# Patient Record
Sex: FEMALE | Race: WHITE | NOT HISPANIC OR LATINO | Employment: UNEMPLOYED | URBAN - NONMETROPOLITAN AREA
[De-identification: names, ages, dates, MRNs, and addresses within clinical notes are randomized per-mention and may not be internally consistent; named-entity substitution may affect disease eponyms.]

---

## 2023-02-21 PROBLEM — D64.9 ANEMIA: Status: ACTIVE | Noted: 2023-02-21

## 2023-02-21 PROBLEM — I47.19 ATRIAL TACHYCARDIA (CMS-HCC): Status: ACTIVE | Noted: 2023-02-21

## 2023-02-21 PROBLEM — E66.9 DIABETES MELLITUS TYPE 2 IN OBESE: Status: ACTIVE | Noted: 2023-02-21

## 2023-02-21 PROBLEM — M86.179: Status: ACTIVE | Noted: 2023-02-21

## 2023-02-21 PROBLEM — E78.5 DYSLIPIDEMIA: Status: ACTIVE | Noted: 2023-02-21

## 2023-02-21 PROBLEM — K74.60 HEPATIC CIRRHOSIS (MULTI): Status: ACTIVE | Noted: 2023-02-21

## 2023-02-21 PROBLEM — S91.109A OPEN TOE WOUND: Status: ACTIVE | Noted: 2023-02-21

## 2023-02-21 PROBLEM — E11.69 DIABETES MELLITUS TYPE 2 IN OBESE: Status: ACTIVE | Noted: 2023-02-21

## 2023-02-21 PROBLEM — F32.A DEPRESSION: Status: ACTIVE | Noted: 2023-02-21

## 2023-02-21 PROBLEM — F41.9 ANXIETY: Status: ACTIVE | Noted: 2023-02-21

## 2023-02-21 PROBLEM — L03.031 CELLULITIS OF TOE OF RIGHT FOOT: Status: ACTIVE | Noted: 2023-02-21

## 2023-02-21 PROBLEM — E66.9 CLASS 1 OBESITY WITH BODY MASS INDEX (BMI) OF 31.0 TO 31.9 IN ADULT: Status: ACTIVE | Noted: 2023-02-21

## 2023-02-21 PROBLEM — H02.9 EYELID LESION: Status: ACTIVE | Noted: 2023-02-21

## 2023-02-21 PROBLEM — L03.90 CELLULITIS: Status: ACTIVE | Noted: 2023-02-21

## 2023-02-21 PROBLEM — E66.811 CLASS 1 OBESITY WITH BODY MASS INDEX (BMI) OF 31.0 TO 31.9 IN ADULT: Status: ACTIVE | Noted: 2023-02-21

## 2023-02-21 PROBLEM — M79.89 LEG SWELLING: Status: ACTIVE | Noted: 2023-02-21

## 2023-02-21 PROBLEM — L97.519: Status: ACTIVE | Noted: 2023-02-21

## 2023-02-21 PROBLEM — R16.0 LIVER MASS: Status: ACTIVE | Noted: 2023-02-21

## 2023-02-21 PROBLEM — L03.115 CELLULITIS OF RIGHT LOWER EXTREMITY: Status: ACTIVE | Noted: 2023-02-21

## 2023-02-21 PROBLEM — I10 BENIGN ESSENTIAL HYPERTENSION: Status: ACTIVE | Noted: 2023-02-21

## 2023-02-21 PROBLEM — R19.06 EPIGASTRIC LUMP: Status: ACTIVE | Noted: 2023-02-21

## 2023-02-21 RX ORDER — ASCORBIC ACID 250 MG
250 TABLET ORAL 2 TIMES DAILY
COMMUNITY

## 2023-02-21 RX ORDER — ATORVASTATIN CALCIUM 20 MG/1
20 TABLET, FILM COATED ORAL DAILY
COMMUNITY
Start: 2022-06-30 | End: 2023-06-19 | Stop reason: SDUPTHER

## 2023-02-21 RX ORDER — SITAGLIPTIN AND METFORMIN HYDROCHLORIDE 500; 50 MG/1; MG/1
1 TABLET, FILM COATED ORAL
COMMUNITY
Start: 2022-06-30 | End: 2023-06-19 | Stop reason: SDUPTHER

## 2023-02-21 RX ORDER — LOSARTAN POTASSIUM AND HYDROCHLOROTHIAZIDE 12.5; 5 MG/1; MG/1
1 TABLET ORAL DAILY
COMMUNITY
Start: 2021-06-21 | End: 2023-06-19 | Stop reason: SDUPTHER

## 2023-02-21 RX ORDER — FERROUS GLUCONATE 325 MG
38 TABLET ORAL 2 TIMES DAILY
COMMUNITY
End: 2023-03-14 | Stop reason: SDUPTHER

## 2023-02-21 RX ORDER — AMLODIPINE BESYLATE 10 MG/1
10 TABLET ORAL DAILY
COMMUNITY
Start: 2022-06-03 | End: 2023-06-19 | Stop reason: SDUPTHER

## 2023-02-21 RX ORDER — GABAPENTIN 400 MG/1
400 CAPSULE ORAL 3 TIMES DAILY
COMMUNITY
End: 2023-03-14 | Stop reason: SDUPTHER

## 2023-02-21 RX ORDER — CEPHALEXIN 500 MG/1
500 CAPSULE ORAL 3 TIMES DAILY
COMMUNITY
End: 2023-06-19 | Stop reason: ALTCHOICE

## 2023-02-21 RX ORDER — METOPROLOL SUCCINATE 50 MG/1
50 TABLET, EXTENDED RELEASE ORAL DAILY
COMMUNITY
Start: 2020-01-19 | End: 2023-06-19 | Stop reason: SDUPTHER

## 2023-02-21 RX ORDER — POTASSIUM CHLORIDE 1500 MG/1
20 TABLET, EXTENDED RELEASE ORAL 2 TIMES DAILY
COMMUNITY
Start: 2021-06-21 | End: 2023-06-19 | Stop reason: SDUPTHER

## 2023-02-21 RX ORDER — SERTRALINE HYDROCHLORIDE 100 MG/1
100 TABLET, FILM COATED ORAL 2 TIMES DAILY
COMMUNITY
End: 2023-06-19 | Stop reason: SDUPTHER

## 2023-03-14 DIAGNOSIS — D50.9 IRON DEFICIENCY ANEMIA, UNSPECIFIED IRON DEFICIENCY ANEMIA TYPE: ICD-10-CM

## 2023-03-14 DIAGNOSIS — L03.115 CELLULITIS OF RIGHT LOWER EXTREMITY: ICD-10-CM

## 2023-03-14 DIAGNOSIS — M86.179 OTHER ACUTE OSTEOMYELITIS, UNSPECIFIED ANKLE AND FOOT (MULTI): ICD-10-CM

## 2023-03-14 RX ORDER — FERROUS GLUCONATE 325 MG
38 TABLET ORAL 2 TIMES DAILY
Qty: 180 TABLET | Refills: 0 | Status: SHIPPED | OUTPATIENT
Start: 2023-03-14 | End: 2023-09-05

## 2023-03-14 RX ORDER — GABAPENTIN 400 MG/1
400 CAPSULE ORAL 3 TIMES DAILY
Qty: 270 CAPSULE | Refills: 0 | Status: SHIPPED | OUTPATIENT
Start: 2023-03-14 | End: 2023-06-19 | Stop reason: SDUPTHER

## 2023-03-23 ENCOUNTER — APPOINTMENT (OUTPATIENT)
Dept: PRIMARY CARE | Facility: CLINIC | Age: 58
End: 2023-03-23
Payer: COMMERCIAL

## 2023-04-17 DIAGNOSIS — Z12.31 VISIT FOR SCREENING MAMMOGRAM: ICD-10-CM

## 2023-05-19 RX ORDER — SERTRALINE HYDROCHLORIDE 100 MG/1
TABLET, FILM COATED ORAL
Qty: 180 TABLET | Refills: 3 | OUTPATIENT
Start: 2023-05-19

## 2023-06-01 RX ORDER — ATORVASTATIN CALCIUM 20 MG/1
TABLET, FILM COATED ORAL
Qty: 90 TABLET | Refills: 3 | OUTPATIENT
Start: 2023-06-01

## 2023-06-05 DIAGNOSIS — N63.20 MASS OF LEFT BREAST, UNSPECIFIED QUADRANT: Primary | ICD-10-CM

## 2023-06-05 NOTE — TELEPHONE ENCOUNTER
----- Message from Trell Pierre MD sent at 6/5/2023  2:24 PM EDT -----  Suspicious lesion in the left breast needs follow-up biopsy refer patient to breast surgery  Refer patient to Dr. Awad

## 2023-06-14 ENCOUNTER — APPOINTMENT (OUTPATIENT)
Dept: PRIMARY CARE | Facility: CLINIC | Age: 58
End: 2023-06-14
Payer: COMMERCIAL

## 2023-06-15 ENCOUNTER — APPOINTMENT (OUTPATIENT)
Dept: PRIMARY CARE | Facility: CLINIC | Age: 58
End: 2023-06-15
Payer: COMMERCIAL

## 2023-06-19 ENCOUNTER — OFFICE VISIT (OUTPATIENT)
Dept: PRIMARY CARE | Facility: CLINIC | Age: 58
End: 2023-06-19
Payer: COMMERCIAL

## 2023-06-19 VITALS
TEMPERATURE: 97.7 F | WEIGHT: 188.1 LBS | OXYGEN SATURATION: 97 % | SYSTOLIC BLOOD PRESSURE: 136 MMHG | HEART RATE: 87 BPM | BODY MASS INDEX: 32.29 KG/M2 | DIASTOLIC BLOOD PRESSURE: 80 MMHG

## 2023-06-19 DIAGNOSIS — I10 BENIGN ESSENTIAL HYPERTENSION: ICD-10-CM

## 2023-06-19 DIAGNOSIS — E66.9 DIABETES MELLITUS TYPE 2 IN OBESE: ICD-10-CM

## 2023-06-19 DIAGNOSIS — E78.5 DYSLIPIDEMIA: ICD-10-CM

## 2023-06-19 DIAGNOSIS — I47.19 ATRIAL TACHYCARDIA (CMS-HCC): ICD-10-CM

## 2023-06-19 DIAGNOSIS — E87.6 HYPOKALEMIA: ICD-10-CM

## 2023-06-19 DIAGNOSIS — F32.A DEPRESSION, UNSPECIFIED DEPRESSION TYPE: ICD-10-CM

## 2023-06-19 DIAGNOSIS — M86.179 OTHER ACUTE OSTEOMYELITIS, UNSPECIFIED ANKLE AND FOOT (MULTI): ICD-10-CM

## 2023-06-19 DIAGNOSIS — E11.69 DIABETES MELLITUS TYPE 2 IN OBESE: ICD-10-CM

## 2023-06-19 DIAGNOSIS — I10 HYPERTENSION, UNSPECIFIED TYPE: ICD-10-CM

## 2023-06-19 DIAGNOSIS — Z79.899 HIGH RISK MEDICATION USE: Primary | ICD-10-CM

## 2023-06-19 DIAGNOSIS — L03.115 CELLULITIS OF RIGHT LOWER EXTREMITY: ICD-10-CM

## 2023-06-19 LAB
POC FINGERSTICK BLOOD GLUCOSE: 110 MG/DL (ref 70–100)
POC HEMOGLOBIN A1C: 5 % (ref 4.2–6.5)

## 2023-06-19 PROCEDURE — 1036F TOBACCO NON-USER: CPT | Performed by: INTERNAL MEDICINE

## 2023-06-19 PROCEDURE — 3075F SYST BP GE 130 - 139MM HG: CPT | Performed by: INTERNAL MEDICINE

## 2023-06-19 PROCEDURE — 82962 GLUCOSE BLOOD TEST: CPT | Performed by: INTERNAL MEDICINE

## 2023-06-19 PROCEDURE — 3044F HG A1C LEVEL LT 7.0%: CPT | Performed by: INTERNAL MEDICINE

## 2023-06-19 PROCEDURE — 3079F DIAST BP 80-89 MM HG: CPT | Performed by: INTERNAL MEDICINE

## 2023-06-19 PROCEDURE — 83036 HEMOGLOBIN GLYCOSYLATED A1C: CPT | Performed by: INTERNAL MEDICINE

## 2023-06-19 PROCEDURE — 99214 OFFICE O/P EST MOD 30 MIN: CPT | Performed by: INTERNAL MEDICINE

## 2023-06-19 RX ORDER — SERTRALINE HYDROCHLORIDE 100 MG/1
100 TABLET, FILM COATED ORAL 2 TIMES DAILY
Qty: 180 TABLET | Refills: 1 | Status: ON HOLD | OUTPATIENT
Start: 2023-06-19 | End: 2023-11-28

## 2023-06-19 RX ORDER — METOPROLOL SUCCINATE 50 MG/1
50 TABLET, EXTENDED RELEASE ORAL DAILY
Qty: 90 TABLET | Refills: 1 | Status: ON HOLD | OUTPATIENT
Start: 2023-06-19 | End: 2023-11-28

## 2023-06-19 RX ORDER — GABAPENTIN 400 MG/1
400 CAPSULE ORAL 3 TIMES DAILY
Qty: 270 CAPSULE | Refills: 0 | Status: SHIPPED | OUTPATIENT
Start: 2023-06-19 | End: 2024-01-17 | Stop reason: SDUPTHER

## 2023-06-19 RX ORDER — AMLODIPINE BESYLATE 10 MG/1
10 TABLET ORAL DAILY
Qty: 90 TABLET | Refills: 1 | Status: ON HOLD | OUTPATIENT
Start: 2023-06-19 | End: 2023-11-28

## 2023-06-19 RX ORDER — LOSARTAN POTASSIUM AND HYDROCHLOROTHIAZIDE 12.5; 5 MG/1; MG/1
1 TABLET ORAL DAILY
Qty: 90 TABLET | Refills: 1 | Status: ON HOLD | OUTPATIENT
Start: 2023-06-19 | End: 2023-11-28

## 2023-06-19 RX ORDER — POTASSIUM CHLORIDE 1500 MG/1
20 TABLET, EXTENDED RELEASE ORAL 2 TIMES DAILY
Qty: 180 TABLET | Refills: 1 | Status: ON HOLD | OUTPATIENT
Start: 2023-06-19 | End: 2023-11-28

## 2023-06-19 RX ORDER — SITAGLIPTIN AND METFORMIN HYDROCHLORIDE 500; 50 MG/1; MG/1
1 TABLET, FILM COATED ORAL
Qty: 180 TABLET | Refills: 1 | Status: SHIPPED | OUTPATIENT
Start: 2023-06-19 | End: 2024-01-17 | Stop reason: SDUPTHER

## 2023-06-19 RX ORDER — ATORVASTATIN CALCIUM 20 MG/1
20 TABLET, FILM COATED ORAL DAILY
Qty: 90 TABLET | Refills: 1 | Status: SHIPPED | OUTPATIENT
Start: 2023-06-19 | End: 2024-01-17 | Stop reason: SDUPTHER

## 2023-06-19 ASSESSMENT — ENCOUNTER SYMPTOMS
ARTHRALGIAS: 0
BLOOD IN STOOL: 0
WOUND: 1
SORE THROAT: 0
PALPITATIONS: 0
SINUS PAIN: 0
DIARRHEA: 0
COUGH: 0
HYPERTENSION: 1
DIZZINESS: 0
WHEEZING: 0
HEADACHES: 0
FATIGUE: 0
ABDOMINAL PAIN: 0
BRUISES/BLEEDS EASILY: 0
FEVER: 0
DIFFICULTY URINATING: 0
UNEXPECTED WEIGHT CHANGE: 0

## 2023-06-19 NOTE — PROGRESS NOTES
Subjective   Patient ID: Sabrina Michaud is a 58 y.o. female who presents for Diabetes (Requests ozempic), Med Refill, Hypertension, and Hyperlipidemia.    -Status post right leg cellulitis improving right big toe diabetic ulcer patient under care of podiatry healing well slowly  -Diabetes obesity patient takes Janumet twice a day need to lose weight low-carb diet exercise  -Add Ozempic 0.25 mg weekly  -Status post right lower extremity cellulitis continue with Keflex follow-up infectious disease  - Diabetes controlled to continue with current medication  - Peripheral neuropathy continue with gabapentin  -Underlying the right lower eyelid skin lesion need to proceed with plastic surgery may have underlying basal cell cancer repair done today  -Hypertension controlled  -Anxiety and depression, uncontrolled , increase Zoloft 100 mg, to BID no medication side effects  Weight loss   -Anemia or leukopenia and thrombocytopenia hepatomegaly unremarkable liver exam on MRI secondary to alcohol induced liver cirrhosis  Patient counseled about alcohol cessation needs to follow-up hematology upper endoscopy as an outpatient patient needs to schedule appointment with Dr. Us  -Anemia follow-up hematology oncology, follow-up CBC  Reevaluate patient in 4 weeks        Diabetes  Pertinent negatives for hypoglycemia include no dizziness or headaches. Pertinent negatives for diabetes include no chest pain and no fatigue.   Med Refill  Pertinent negatives include no abdominal pain, arthralgias, chest pain, congestion, coughing, fatigue, fever, headaches, rash or sore throat.   Hypertension  Pertinent negatives include no chest pain, headaches or palpitations.   Hyperlipidemia  Pertinent negatives include no chest pain.          Review of Systems   Constitutional:  Negative for fatigue, fever and unexpected weight change.   HENT:  Negative for congestion, ear discharge, ear pain, mouth sores, sinus pain and sore throat.    Eyes:   Negative for visual disturbance.   Respiratory:  Negative for cough and wheezing.    Cardiovascular:  Negative for chest pain, palpitations and leg swelling.   Gastrointestinal:  Negative for abdominal pain, blood in stool and diarrhea.   Genitourinary:  Negative for difficulty urinating.   Musculoskeletal:  Negative for arthralgias.   Skin:  Positive for wound. Negative for rash.   Neurological:  Negative for dizziness and headaches.   Hematological:  Does not bruise/bleed easily.   Psychiatric/Behavioral:  Negative for behavioral problems.    All other systems reviewed and are negative.      Objective   Lab Results   Component Value Date    HGBA1C 5.0 06/19/2023      /80   Pulse 87   Temp 36.5 °C (97.7 °F)   Wt 85.3 kg (188 lb 1.6 oz)   SpO2 97%   BMI 32.29 kg/m²   Lab Results   Component Value Date    WBC 4.9 02/12/2023    HGB 9.9 (L) 02/12/2023    HCT 33.1 (L) 02/12/2023     (L) 02/12/2023    CHOL 112 06/01/2022    TRIG 222 (H) 06/01/2022    HDL 32.0 (A) 06/01/2022    ALT 18 02/07/2023    AST 35 02/07/2023     02/12/2023    K 3.6 02/12/2023     02/12/2023    CREATININE 0.50 02/12/2023    BUN 10 02/12/2023    CO2 26 02/12/2023    TSH 3.89 05/24/2021    INR 1.3 (H) 08/17/2022    HGBA1C 5.0 06/19/2023     par   Physical Exam  Vitals and nursing note reviewed.   Constitutional:       Appearance: Normal appearance.   HENT:      Head: Normocephalic.      Nose: Nose normal.   Eyes:      Conjunctiva/sclera: Conjunctivae normal.      Pupils: Pupils are equal, round, and reactive to light.   Cardiovascular:      Rate and Rhythm: Regular rhythm.   Pulmonary:      Effort: Pulmonary effort is normal.      Breath sounds: Normal breath sounds.   Abdominal:      General: Abdomen is flat.      Palpations: Abdomen is soft.   Musculoskeletal:      Cervical back: Neck supple.   Skin:     General: Skin is warm.      Findings: No lesion (Diabetic foot ulcer on the right big toe with surgical dressing).    Neurological:      General: No focal deficit present.      Mental Status: She is oriented to person, place, and time.   Psychiatric:         Mood and Affect: Mood normal.         Assessment/Plan   Sabrina was seen today for diabetes, med refill, hypertension and hyperlipidemia.  Diagnoses and all orders for this visit:  High risk medication use (Primary)  -     CBC and Auto Differential; Future  Diabetes mellitus type 2 in obese (CMS/HCC)  -     POCT fingerstick glucose manually resulted  -     POCT glycosylated hemoglobin (Hb A1C) manually resulted  -     SITagliptin phos-metformin (Janumet)  mg tablet; Take 1 tablet by mouth 2 times a day with meals.  -     semaglutide 0.25 mg or 0.5 mg (2 mg/3 mL) pen injector; Inject 0.25 mg under the skin 1 (one) time per week.  Other acute osteomyelitis, unspecified ankle and foot (CMS/[facility])  -     gabapentin (Neurontin) 400 mg capsule; Take 1 capsule (400 mg) by mouth 3 times a day.  Benign essential hypertension  -     amLODIPine (Norvasc) 10 mg tablet; Take 1 tablet (10 mg) by mouth once daily.  -     losartan-hydrochlorothiazide (Hyzaar) 50-12.5 mg tablet; Take 1 tablet by mouth once daily.  -     metoprolol succinate XL (Toprol-XL) 50 mg 24 hr tablet; Take 1 tablet (50 mg) by mouth once daily. Entered during abstraction- No further refills until seen in office  Depression, unspecified depression type  -     sertraline (Zoloft) 100 mg tablet; Take 1 tablet (100 mg) by mouth 2 times a day.  Hypokalemia  -     potassium chloride CR (K-Tab) 20 mEq ER tablet; Take 1 tablet (20 mEq) by mouth 2 times a day.  Dyslipidemia  -     atorvastatin (Lipitor) 20 mg tablet; Take 1 tablet (20 mg) by mouth once daily.  Atrial tachycardia (CMS/[facility])  Hypertension, unspecified type  -     Comprehensive Metabolic Panel; Future  Cellulitis of right lower extremity  Other orders  -     Follow Up In Primary Care; Future  -     Follow Up In Primary Care; Future   -Status post right leg  cellulitis improving right big toe diabetic ulcer patient under care of podiatry healing well slowly  -Diabetes obesity patient takes Janumet twice a day need to lose weight low-carb diet exercise  -Add Ozempic 0.25 mg weekly  -Status post right lower extremity cellulitis continue with Keflex follow-up infectious disease  - Diabetes controlled to continue with current medication  - Peripheral neuropathy continue with gabapentin  -Underlying the right lower eyelid skin lesion need to proceed with plastic surgery may have underlying basal cell cancer repair done today  -Hypertension controlled  -Anxiety and depression, uncontrolled , increase Zoloft 100 mg, to BID no medication side effects  Weight loss   -Anemia or leukopenia and thrombocytopenia hepatomegaly unremarkable liver exam on MRI secondary to alcohol induced liver cirrhosis  Patient counseled about alcohol cessation needs to follow-up hematology upper endoscopy as an outpatient patient needs to schedule appointment with Dr. Us  -Anemia follow-up hematology oncology, follow-up CBC  Reevaluate patient in 4 weeks

## 2023-06-20 DIAGNOSIS — E11.69 DIABETES MELLITUS TYPE 2 IN OBESE: ICD-10-CM

## 2023-06-20 DIAGNOSIS — E66.9 DIABETES MELLITUS TYPE 2 IN OBESE: ICD-10-CM

## 2023-07-15 LAB
GRAM STAIN: ABNORMAL
TISSUE/WOUND CULTURE/SMEAR: ABNORMAL
TISSUE/WOUND CULTURE/SMEAR: ABNORMAL

## 2023-09-04 DIAGNOSIS — D50.9 IRON DEFICIENCY ANEMIA, UNSPECIFIED IRON DEFICIENCY ANEMIA TYPE: ICD-10-CM

## 2023-09-05 RX ORDER — FERROUS GLUCONATE 324(38)MG
TABLET ORAL
Qty: 180 TABLET | Refills: 1 | Status: SHIPPED | OUTPATIENT
Start: 2023-09-05 | End: 2024-01-17 | Stop reason: SDUPTHER

## 2023-09-06 ENCOUNTER — HOSPITAL ENCOUNTER (OUTPATIENT)
Dept: DATA CONVERSION | Facility: HOSPITAL | Age: 58
Discharge: HOME | End: 2023-09-06
Payer: COMMERCIAL

## 2023-09-06 DIAGNOSIS — C50.512 MALIGNANT NEOPLASM OF LOWER-OUTER QUADRANT OF LEFT FEMALE BREAST (MULTI): ICD-10-CM

## 2023-09-06 LAB
ANION GAP SERPL CALCULATED.3IONS-SCNC: 15 MMOL/L (ref 0–19)
BACTERIA ISLT: NORMAL
BACTERIA SPEC CULT: NORMAL
BACTERIA UR QL AUTO: NEGATIVE
BASOPHILS # BLD AUTO: 0.04 K/UL (ref 0–0.22)
BASOPHILS NFR BLD AUTO: 0.6 % (ref 0–1)
BILIRUB UR QL STRIP.AUTO: NEGATIVE
BUN SERPL-MCNC: 11 MG/DL (ref 8–25)
BUN/CREAT SERPL: 18.3 RATIO (ref 8–21)
CALCIUM SERPL-MCNC: 10.5 MG/DL (ref 8.5–10.4)
CC # UR: NORMAL /UL
CEFEPIME SUSC ISLT: NORMAL
CEFTAZIDIME SUSC ISLT: NORMAL
CHLORIDE SERPL-SCNC: 101 MMOL/L (ref 97–107)
CIPROFLOXACIN SUSC ISLT: NORMAL
CLARITY UR: CLEAR
CO2 SERPL-SCNC: 23 MMOL/L (ref 24–31)
COLOR UR: YELLOW
CREAT SERPL-MCNC: 0.6 MG/DL (ref 0.4–1.6)
DEPRECATED RDW RBC AUTO: 50.1 FL (ref 37–54)
DIFFERENTIAL METHOD BLD: ABNORMAL
EOSINOPHIL # BLD AUTO: 0.23 K/UL (ref 0–0.45)
EOSINOPHIL NFR BLD: 3.5 % (ref 0–3)
ERYTHROCYTE [DISTWIDTH] IN BLOOD BY AUTOMATED COUNT: 13.3 % (ref 11.7–15)
GENTAMICIN ISLT MLC: NORMAL
GFR SERPL CREATININE-BSD FRML MDRD: 104 ML/MIN/1.73 M2
GLUCOSE SERPL-MCNC: 86 MG/DL (ref 65–99)
GLUCOSE UR STRIP.AUTO-MCNC: NEGATIVE MG/DL
HBA1C MFR BLD: 4.7 % (ref 4–6)
HCT VFR BLD AUTO: 38.4 % (ref 36–44)
HGB BLD-MCNC: 12.4 GM/DL (ref 12–15)
HGB UR QL STRIP.AUTO: 2 /HPF (ref 0–3)
HGB UR QL: NEGATIVE
HYALINE CASTS UR QL AUTO: ABNORMAL /LPF
IMM GRANULOCYTES # BLD AUTO: 0.03 K/UL (ref 0–0.1)
KETONES UR QL STRIP.AUTO: NEGATIVE
LEUKOCYTE ESTERASE UR QL STRIP.AUTO: ABNORMAL
LEVOFLOXACIN SUSC ISLT: NORMAL
LYMPHOCYTES # BLD AUTO: 1.64 K/UL (ref 1.2–3.2)
LYMPHOCYTES NFR BLD MANUAL: 25.1 % (ref 20–40)
MCH RBC QN AUTO: 32.8 PG (ref 26–34)
MCHC RBC AUTO-ENTMCNC: 32.3 % (ref 31–37)
MCV RBC AUTO: 101.6 FL (ref 80–100)
MEROPENEM SUSC ISLT: NORMAL
MIC (SUSCEPTIBILITY): NORMAL
MICROSCOPIC (UA): ABNORMAL
MONOCYTES # BLD AUTO: 0.46 K/UL (ref 0–0.8)
MONOCYTES NFR BLD MANUAL: 7 % (ref 0–8)
MRSA DNA SPEC QL NAA+PROBE: NEGATIVE
NEUTROPHILS # BLD AUTO: 4.14 K/UL
NEUTROPHILS # BLD AUTO: 4.14 K/UL (ref 1.8–7.7)
NEUTROPHILS.IMMATURE NFR BLD: 0.5 % (ref 0–1)
NEUTS SEG NFR BLD: 63.3 % (ref 50–70)
NITRITE UR QL STRIP.AUTO: NEGATIVE
NRBC BLD-RTO: 0 /100 WBC
PH UR STRIP.AUTO: 7.5 [PH] (ref 4.6–8)
PIP+TAZO SUSC ISLT: NORMAL
PLATELET # BLD AUTO: 97 K/UL (ref 150–450)
PMV BLD AUTO: 12.5 CU (ref 7–12.6)
POTASSIUM SERPL-SCNC: 4.3 MMOL/L (ref 3.4–5.1)
PROT UR STRIP.AUTO-MCNC: NEGATIVE MG/DL
RBC # BLD AUTO: 3.78 M/UL (ref 4–4.9)
REPORT STATUS -LH SQ DATA CONVERSION: NORMAL
SERVICE CMNT-IMP: NORMAL
SODIUM SERPL-SCNC: 138 MMOL/L (ref 133–145)
SP GR UR STRIP.AUTO: 1.02 (ref 1–1.03)
SPECIMEN SOURCE: NORMAL
SPECIMEN SOURCE: NORMAL
SQUAMOUS UR QL AUTO: ABNORMAL /HPF
URINE CULTURE: ABNORMAL
UROBILINOGEN UR QL STRIP.AUTO: 2 MG/DL (ref 0–1)
WBC # BLD AUTO: 6.5 K/UL (ref 4.5–11)
WBC #/AREA URNS AUTO: 7 /HPF (ref 0–3)

## 2023-09-13 ENCOUNTER — HOSPITAL ENCOUNTER (OUTPATIENT)
Dept: DATA CONVERSION | Facility: HOSPITAL | Age: 58
Discharge: HOME | End: 2023-09-14
Payer: COMMERCIAL

## 2023-09-13 DIAGNOSIS — C50.912 MALIGNANT NEOPLASM OF UNSPECIFIED SITE OF LEFT FEMALE BREAST (MULTI): ICD-10-CM

## 2023-09-13 DIAGNOSIS — E11.9 TYPE 2 DIABETES MELLITUS WITHOUT COMPLICATIONS (MULTI): ICD-10-CM

## 2023-09-13 DIAGNOSIS — C50.312 MALIGNANT NEOPLASM OF LOWER-INNER QUADRANT OF LEFT FEMALE BREAST (MULTI): ICD-10-CM

## 2023-09-13 DIAGNOSIS — E88.1 LIPODYSTROPHY, NOT ELSEWHERE CLASSIFIED: ICD-10-CM

## 2023-09-13 DIAGNOSIS — I10 ESSENTIAL (PRIMARY) HYPERTENSION: ICD-10-CM

## 2023-09-13 DIAGNOSIS — N60.21 FIBROADENOSIS OF RIGHT BREAST: ICD-10-CM

## 2023-09-13 DIAGNOSIS — Z41.1 ENCOUNTER FOR COSMETIC SURGERY: ICD-10-CM

## 2023-09-13 DIAGNOSIS — N64.89 OTHER SPECIFIED DISORDERS OF BREAST: ICD-10-CM

## 2023-09-13 LAB
GLUCOSE BLD STRIP.AUTO-MCNC: 103 MG/DL (ref 65–99)
GLUCOSE BLD STRIP.AUTO-MCNC: 136 MG/DL (ref 65–99)
GLUCOSE BLD STRIP.AUTO-MCNC: 148 MG/DL (ref 65–99)

## 2023-09-14 LAB
BASOPHILS # BLD AUTO: 0.02 K/UL (ref 0–0.22)
BASOPHILS NFR BLD AUTO: 0.2 % (ref 0–1)
DEPRECATED RDW RBC AUTO: 50 FL (ref 37–54)
DIFFERENTIAL METHOD BLD: ABNORMAL
EOSINOPHIL # BLD AUTO: 0.04 K/UL (ref 0–0.45)
EOSINOPHIL NFR BLD: 0.5 % (ref 0–3)
ERYTHROCYTE [DISTWIDTH] IN BLOOD BY AUTOMATED COUNT: 13.6 % (ref 11.7–15)
GLUCOSE BLD STRIP.AUTO-MCNC: 96 MG/DL (ref 65–99)
HCT VFR BLD AUTO: 31.6 % (ref 36–44)
HGB BLD-MCNC: 10.3 GM/DL (ref 12–15)
IMM GRANULOCYTES # BLD AUTO: 0.02 K/UL (ref 0–0.1)
LYMPHOCYTES # BLD AUTO: 1.22 K/UL (ref 1.2–3.2)
LYMPHOCYTES NFR BLD MANUAL: 15.2 % (ref 20–40)
MCH RBC QN AUTO: 33 PG (ref 26–34)
MCHC RBC AUTO-ENTMCNC: 32.6 % (ref 31–37)
MCV RBC AUTO: 101.3 FL (ref 80–100)
MONOCYTES # BLD AUTO: 0.71 K/UL (ref 0–0.8)
MONOCYTES NFR BLD MANUAL: 8.9 % (ref 0–8)
NEUTROPHILS # BLD AUTO: 6 K/UL
NEUTROPHILS # BLD AUTO: 6 K/UL (ref 1.8–7.7)
NEUTROPHILS.IMMATURE NFR BLD: 0.2 % (ref 0–1)
NEUTS SEG NFR BLD: 75 % (ref 50–70)
NRBC BLD-RTO: 0 /100 WBC
PLATELET # BLD AUTO: 82 K/UL (ref 150–450)
PMV BLD AUTO: 12.9 CU (ref 7–12.6)
RBC # BLD AUTO: 3.12 M/UL (ref 4–4.9)
WBC # BLD AUTO: 8 K/UL (ref 4.5–11)

## 2023-09-19 ENCOUNTER — APPOINTMENT (OUTPATIENT)
Dept: PRIMARY CARE | Facility: CLINIC | Age: 58
End: 2023-09-19
Payer: COMMERCIAL

## 2023-09-21 LAB
GLUCOSE BLD STRIP.AUTO-MCNC: 100 MG/DL (ref 65–99)
GLUCOSE BLD STRIP.AUTO-MCNC: 104 MG/DL (ref 65–99)
GLUCOSE BLD STRIP.AUTO-MCNC: 107 MG/DL (ref 65–99)
GLUCOSE BLD STRIP.AUTO-MCNC: 121 MG/DL (ref 65–99)

## 2023-09-22 LAB
GLUCOSE BLD STRIP.AUTO-MCNC: 107 MG/DL (ref 65–99)
GLUCOSE BLD STRIP.AUTO-MCNC: 143 MG/DL (ref 65–99)
GLUCOSE BLD STRIP.AUTO-MCNC: 143 MG/DL (ref 65–99)

## 2023-09-25 LAB
GLUCOSE BLD STRIP.AUTO-MCNC: 134 MG/DL (ref 65–99)
GLUCOSE BLD STRIP.AUTO-MCNC: 99 MG/DL (ref 65–99)

## 2023-09-26 LAB
GLUCOSE BLD STRIP.AUTO-MCNC: 190 MG/DL (ref 65–99)
GLUCOSE BLD STRIP.AUTO-MCNC: 73 MG/DL (ref 65–99)

## 2023-09-27 LAB
GLUCOSE BLD STRIP.AUTO-MCNC: 219 MG/DL (ref 65–99)
GLUCOSE BLD STRIP.AUTO-MCNC: 93 MG/DL (ref 65–99)

## 2023-09-28 LAB
GLUCOSE BLD STRIP.AUTO-MCNC: 172 MG/DL (ref 65–99)
GLUCOSE BLD STRIP.AUTO-MCNC: 86 MG/DL (ref 65–99)

## 2023-09-29 ENCOUNTER — HOSPITAL ENCOUNTER (OUTPATIENT)
Dept: DATA CONVERSION | Facility: HOSPITAL | Age: 58
Discharge: HOME | End: 2023-09-29
Payer: COMMERCIAL

## 2023-09-29 DIAGNOSIS — T14.8XXA OTHER INJURY OF UNSPECIFIED BODY REGION, INITIAL ENCOUNTER: ICD-10-CM

## 2023-09-29 LAB
GLUCOSE BLD STRIP.AUTO-MCNC: 121 MG/DL (ref 65–99)
GLUCOSE BLD STRIP.AUTO-MCNC: 168 MG/DL (ref 65–99)

## 2023-09-30 DIAGNOSIS — E66.9 DIABETES MELLITUS TYPE 2 IN OBESE: ICD-10-CM

## 2023-09-30 DIAGNOSIS — E11.69 DIABETES MELLITUS TYPE 2 IN OBESE: ICD-10-CM

## 2023-10-02 ENCOUNTER — OFFICE VISIT (OUTPATIENT)
Dept: WOUND CARE | Facility: HOSPITAL | Age: 58
End: 2023-10-02
Payer: COMMERCIAL

## 2023-10-02 LAB
GLUCOSE BLD STRIP.AUTO-MCNC: 128 MG/DL (ref 65–99)
GLUCOSE BLD STRIP.AUTO-MCNC: 142 MG/DL (ref 65–99)

## 2023-10-02 PROCEDURE — 82962 GLUCOSE BLOOD TEST: CPT | Mod: 91

## 2023-10-02 PROCEDURE — 99183 HYPERBARIC OXYGEN THERAPY: CPT | Performed by: NURSE PRACTITIONER

## 2023-10-02 PROCEDURE — G0277 HBOT, FULL BODY CHAMBER, 30M: HCPCS

## 2023-10-02 RX ORDER — SEMAGLUTIDE 0.68 MG/ML
INJECTION, SOLUTION SUBCUTANEOUS
Qty: 3 ML | Refills: 0 | Status: SHIPPED | OUTPATIENT
Start: 2023-10-02 | End: 2024-01-17 | Stop reason: SDUPTHER

## 2023-10-03 ENCOUNTER — OFFICE VISIT (OUTPATIENT)
Dept: WOUND CARE | Facility: HOSPITAL | Age: 58
End: 2023-10-03
Payer: COMMERCIAL

## 2023-10-03 LAB
GLUCOSE BLD STRIP.AUTO-MCNC: 162 MG/DL (ref 65–99)
GLUCOSE BLD STRIP.AUTO-MCNC: 86 MG/DL (ref 65–99)

## 2023-10-03 PROCEDURE — G0277 HBOT, FULL BODY CHAMBER, 30M: HCPCS

## 2023-10-03 PROCEDURE — 82962 GLUCOSE BLOOD TEST: CPT | Mod: 91

## 2023-10-04 ENCOUNTER — OFFICE VISIT (OUTPATIENT)
Dept: WOUND CARE | Facility: HOSPITAL | Age: 58
End: 2023-10-04
Payer: COMMERCIAL

## 2023-10-04 PROCEDURE — 82947 ASSAY GLUCOSE BLOOD QUANT: CPT

## 2023-10-04 PROCEDURE — 11042 DBRDMT SUBQ TIS 1ST 20SQCM/<: CPT

## 2023-10-04 PROCEDURE — G0277 HBOT, FULL BODY CHAMBER, 30M: HCPCS

## 2023-10-04 PROCEDURE — 82962 GLUCOSE BLOOD TEST: CPT | Mod: 59

## 2023-10-05 ENCOUNTER — OFFICE VISIT (OUTPATIENT)
Dept: WOUND CARE | Facility: HOSPITAL | Age: 58
End: 2023-10-05
Payer: COMMERCIAL

## 2023-10-05 LAB
GLUCOSE BLD MANUAL STRIP-MCNC: 100 MG/DL (ref 74–99)
GLUCOSE BLD MANUAL STRIP-MCNC: 97 MG/DL (ref 74–99)

## 2023-10-05 PROCEDURE — 82962 GLUCOSE BLOOD TEST: CPT | Mod: 91

## 2023-10-05 PROCEDURE — G0277 HBOT, FULL BODY CHAMBER, 30M: HCPCS

## 2023-10-05 PROCEDURE — 99183 HYPERBARIC OXYGEN THERAPY: CPT | Performed by: NURSE PRACTITIONER

## 2023-10-05 PROCEDURE — 82947 ASSAY GLUCOSE BLOOD QUANT: CPT

## 2023-10-06 ENCOUNTER — OFFICE VISIT (OUTPATIENT)
Dept: WOUND CARE | Facility: HOSPITAL | Age: 58
End: 2023-10-06
Payer: COMMERCIAL

## 2023-10-06 LAB
GLUCOSE BLD MANUAL STRIP-MCNC: 101 MG/DL (ref 74–99)
GLUCOSE BLD MANUAL STRIP-MCNC: 167 MG/DL (ref 74–99)

## 2023-10-06 PROCEDURE — 82962 GLUCOSE BLOOD TEST: CPT | Mod: 59

## 2023-10-06 PROCEDURE — G0277 HBOT, FULL BODY CHAMBER, 30M: HCPCS

## 2023-10-06 PROCEDURE — 82947 ASSAY GLUCOSE BLOOD QUANT: CPT

## 2023-10-09 ENCOUNTER — OFFICE VISIT (OUTPATIENT)
Dept: WOUND CARE | Facility: HOSPITAL | Age: 58
End: 2023-10-09
Payer: COMMERCIAL

## 2023-10-09 LAB — GLUCOSE BLD MANUAL STRIP-MCNC: 206 MG/DL (ref 74–99)

## 2023-10-09 PROCEDURE — 82962 GLUCOSE BLOOD TEST: CPT | Mod: 91

## 2023-10-09 PROCEDURE — 3008F BODY MASS INDEX DOCD: CPT

## 2023-10-09 PROCEDURE — G0277 HBOT, FULL BODY CHAMBER, 30M: HCPCS

## 2023-10-09 PROCEDURE — 99183 HYPERBARIC OXYGEN THERAPY: CPT | Performed by: NURSE PRACTITIONER

## 2023-10-09 PROCEDURE — 3044F HG A1C LEVEL LT 7.0%: CPT

## 2023-10-09 PROCEDURE — 1036F TOBACCO NON-USER: CPT

## 2023-10-09 PROCEDURE — 82947 ASSAY GLUCOSE BLOOD QUANT: CPT

## 2023-10-10 ENCOUNTER — OFFICE VISIT (OUTPATIENT)
Dept: WOUND CARE | Facility: HOSPITAL | Age: 58
End: 2023-10-10
Payer: COMMERCIAL

## 2023-10-10 LAB
GLUCOSE BLD MANUAL STRIP-MCNC: 206 MG/DL (ref 74–99)
GLUCOSE BLD MANUAL STRIP-MCNC: 92 MG/DL (ref 74–99)

## 2023-10-10 PROCEDURE — G0277 HBOT, FULL BODY CHAMBER, 30M: HCPCS

## 2023-10-10 PROCEDURE — 82962 GLUCOSE BLOOD TEST: CPT | Mod: 59

## 2023-10-10 PROCEDURE — 82947 ASSAY GLUCOSE BLOOD QUANT: CPT

## 2023-10-11 ENCOUNTER — CLINICAL SUPPORT (OUTPATIENT)
Dept: WOUND CARE | Facility: HOSPITAL | Age: 58
End: 2023-10-11
Payer: COMMERCIAL

## 2023-10-11 ENCOUNTER — OFFICE VISIT (OUTPATIENT)
Dept: WOUND CARE | Facility: HOSPITAL | Age: 58
End: 2023-10-11
Payer: COMMERCIAL

## 2023-10-11 LAB
GLUCOSE BLD MANUAL STRIP-MCNC: 106 MG/DL (ref 74–99)
GLUCOSE BLD MANUAL STRIP-MCNC: 219 MG/DL (ref 74–99)

## 2023-10-11 PROCEDURE — 82962 GLUCOSE BLOOD TEST: CPT | Mod: 91

## 2023-10-11 PROCEDURE — 82947 ASSAY GLUCOSE BLOOD QUANT: CPT

## 2023-10-11 PROCEDURE — G0277 HBOT, FULL BODY CHAMBER, 30M: HCPCS

## 2023-10-11 PROCEDURE — 11042 DBRDMT SUBQ TIS 1ST 20SQCM/<: CPT

## 2023-10-12 ENCOUNTER — APPOINTMENT (OUTPATIENT)
Dept: WOUND CARE | Facility: HOSPITAL | Age: 58
End: 2023-10-12
Payer: COMMERCIAL

## 2023-10-13 ENCOUNTER — APPOINTMENT (OUTPATIENT)
Dept: WOUND CARE | Facility: HOSPITAL | Age: 58
End: 2023-10-13
Payer: COMMERCIAL

## 2023-10-16 ENCOUNTER — OFFICE VISIT (OUTPATIENT)
Dept: WOUND CARE | Facility: HOSPITAL | Age: 58
End: 2023-10-16
Payer: COMMERCIAL

## 2023-10-16 LAB
GLUCOSE BLD MANUAL STRIP-MCNC: 142 MG/DL (ref 74–99)
GLUCOSE BLD MANUAL STRIP-MCNC: 94 MG/DL (ref 74–99)

## 2023-10-16 PROCEDURE — 3008F BODY MASS INDEX DOCD: CPT | Performed by: NURSE PRACTITIONER

## 2023-10-16 PROCEDURE — G0277 HBOT, FULL BODY CHAMBER, 30M: HCPCS | Performed by: NURSE PRACTITIONER

## 2023-10-16 PROCEDURE — 99183 HYPERBARIC OXYGEN THERAPY: CPT | Performed by: NURSE PRACTITIONER

## 2023-10-16 PROCEDURE — 3044F HG A1C LEVEL LT 7.0%: CPT | Performed by: NURSE PRACTITIONER

## 2023-10-16 PROCEDURE — 82962 GLUCOSE BLOOD TEST: CPT

## 2023-10-16 PROCEDURE — 82947 ASSAY GLUCOSE BLOOD QUANT: CPT | Mod: 59

## 2023-10-16 PROCEDURE — 1036F TOBACCO NON-USER: CPT | Performed by: NURSE PRACTITIONER

## 2023-10-16 PROCEDURE — G0277 HBOT, FULL BODY CHAMBER, 30M: HCPCS

## 2023-10-17 ENCOUNTER — OFFICE VISIT (OUTPATIENT)
Dept: WOUND CARE | Facility: HOSPITAL | Age: 58
End: 2023-10-17
Payer: COMMERCIAL

## 2023-10-17 LAB
GLUCOSE BLD MANUAL STRIP-MCNC: 159 MG/DL (ref 74–99)
GLUCOSE BLD MANUAL STRIP-MCNC: 96 MG/DL (ref 74–99)

## 2023-10-17 PROCEDURE — 82962 GLUCOSE BLOOD TEST: CPT | Mod: 91

## 2023-10-17 PROCEDURE — 82947 ASSAY GLUCOSE BLOOD QUANT: CPT

## 2023-10-17 PROCEDURE — G0277 HBOT, FULL BODY CHAMBER, 30M: HCPCS

## 2023-10-18 ENCOUNTER — APPOINTMENT (OUTPATIENT)
Dept: PRIMARY CARE | Facility: CLINIC | Age: 58
End: 2023-10-18
Payer: COMMERCIAL

## 2023-10-18 ENCOUNTER — CLINICAL SUPPORT (OUTPATIENT)
Dept: WOUND CARE | Facility: HOSPITAL | Age: 58
End: 2023-10-18
Payer: COMMERCIAL

## 2023-10-18 ENCOUNTER — OFFICE VISIT (OUTPATIENT)
Dept: WOUND CARE | Facility: HOSPITAL | Age: 58
End: 2023-10-18
Payer: COMMERCIAL

## 2023-10-18 ENCOUNTER — APPOINTMENT (OUTPATIENT)
Dept: WOUND CARE | Facility: HOSPITAL | Age: 58
End: 2023-10-18
Payer: COMMERCIAL

## 2023-10-18 LAB
GLUCOSE BLD MANUAL STRIP-MCNC: 212 MG/DL (ref 74–99)
GLUCOSE BLD MANUAL STRIP-MCNC: 90 MG/DL (ref 74–99)

## 2023-10-18 PROCEDURE — 82947 ASSAY GLUCOSE BLOOD QUANT: CPT

## 2023-10-18 PROCEDURE — 82962 GLUCOSE BLOOD TEST: CPT | Mod: 59

## 2023-10-18 PROCEDURE — G0277 HBOT, FULL BODY CHAMBER, 30M: HCPCS

## 2023-10-18 PROCEDURE — 97597 DBRDMT OPN WND 1ST 20 CM/<: CPT

## 2023-10-19 ENCOUNTER — OFFICE VISIT (OUTPATIENT)
Dept: WOUND CARE | Facility: HOSPITAL | Age: 58
End: 2023-10-19
Payer: COMMERCIAL

## 2023-10-19 LAB
GLUCOSE BLD MANUAL STRIP-MCNC: 109 MG/DL (ref 74–99)
GLUCOSE BLD MANUAL STRIP-MCNC: 202 MG/DL (ref 74–99)

## 2023-10-19 PROCEDURE — 82947 ASSAY GLUCOSE BLOOD QUANT: CPT

## 2023-10-19 PROCEDURE — G0277 HBOT, FULL BODY CHAMBER, 30M: HCPCS

## 2023-10-19 PROCEDURE — 82962 GLUCOSE BLOOD TEST: CPT | Mod: 59

## 2023-10-20 ENCOUNTER — OFFICE VISIT (OUTPATIENT)
Dept: WOUND CARE | Facility: HOSPITAL | Age: 58
End: 2023-10-20
Payer: COMMERCIAL

## 2023-10-20 LAB
GLUCOSE BLD MANUAL STRIP-MCNC: 108 MG/DL (ref 74–99)
GLUCOSE BLD MANUAL STRIP-MCNC: 163 MG/DL (ref 74–99)

## 2023-10-20 PROCEDURE — 82947 ASSAY GLUCOSE BLOOD QUANT: CPT

## 2023-10-20 PROCEDURE — 82962 GLUCOSE BLOOD TEST: CPT | Mod: 91

## 2023-10-20 PROCEDURE — G0277 HBOT, FULL BODY CHAMBER, 30M: HCPCS

## 2023-10-23 ENCOUNTER — APPOINTMENT (OUTPATIENT)
Dept: WOUND CARE | Facility: HOSPITAL | Age: 58
End: 2023-10-23
Payer: COMMERCIAL

## 2023-10-24 ENCOUNTER — APPOINTMENT (OUTPATIENT)
Dept: WOUND CARE | Facility: HOSPITAL | Age: 58
End: 2023-10-24
Payer: COMMERCIAL

## 2023-10-25 ENCOUNTER — CLINICAL SUPPORT (OUTPATIENT)
Dept: WOUND CARE | Facility: HOSPITAL | Age: 58
End: 2023-10-25
Payer: COMMERCIAL

## 2023-10-25 ENCOUNTER — APPOINTMENT (OUTPATIENT)
Dept: WOUND CARE | Facility: HOSPITAL | Age: 58
End: 2023-10-25
Payer: COMMERCIAL

## 2023-10-25 PROCEDURE — 11042 DBRDMT SUBQ TIS 1ST 20SQCM/<: CPT

## 2023-10-26 ENCOUNTER — APPOINTMENT (OUTPATIENT)
Dept: WOUND CARE | Facility: HOSPITAL | Age: 58
End: 2023-10-26
Payer: COMMERCIAL

## 2023-10-27 ENCOUNTER — APPOINTMENT (OUTPATIENT)
Dept: WOUND CARE | Facility: HOSPITAL | Age: 58
End: 2023-10-27
Payer: COMMERCIAL

## 2023-10-30 ENCOUNTER — OFFICE VISIT (OUTPATIENT)
Dept: WOUND CARE | Facility: HOSPITAL | Age: 58
End: 2023-10-30
Payer: COMMERCIAL

## 2023-10-30 LAB
GLUCOSE BLD MANUAL STRIP-MCNC: 160 MG/DL (ref 74–99)
GLUCOSE BLD MANUAL STRIP-MCNC: 88 MG/DL (ref 74–99)

## 2023-10-30 PROCEDURE — 82962 GLUCOSE BLOOD TEST: CPT

## 2023-10-30 PROCEDURE — 3008F BODY MASS INDEX DOCD: CPT | Performed by: NURSE PRACTITIONER

## 2023-10-30 PROCEDURE — 99183 HYPERBARIC OXYGEN THERAPY: CPT | Performed by: NURSE PRACTITIONER

## 2023-10-30 PROCEDURE — 3074F SYST BP LT 130 MM HG: CPT | Performed by: NURSE PRACTITIONER

## 2023-10-30 PROCEDURE — 3078F DIAST BP <80 MM HG: CPT | Performed by: NURSE PRACTITIONER

## 2023-10-30 PROCEDURE — 82947 ASSAY GLUCOSE BLOOD QUANT: CPT | Mod: 59

## 2023-10-30 PROCEDURE — G0277 HBOT, FULL BODY CHAMBER, 30M: HCPCS

## 2023-10-30 PROCEDURE — 3044F HG A1C LEVEL LT 7.0%: CPT | Performed by: NURSE PRACTITIONER

## 2023-10-30 PROCEDURE — 1036F TOBACCO NON-USER: CPT | Performed by: NURSE PRACTITIONER

## 2023-10-31 ENCOUNTER — HOSPITAL ENCOUNTER (INPATIENT)
Facility: HOSPITAL | Age: 58
LOS: 1 days | Discharge: HOME | DRG: 572 | End: 2023-11-03
Attending: EMERGENCY MEDICINE | Admitting: SPECIALIST
Payer: COMMERCIAL

## 2023-10-31 ENCOUNTER — APPOINTMENT (OUTPATIENT)
Dept: CARDIOLOGY | Facility: HOSPITAL | Age: 58
DRG: 572 | End: 2023-10-31
Payer: COMMERCIAL

## 2023-10-31 ENCOUNTER — APPOINTMENT (OUTPATIENT)
Dept: RADIOLOGY | Facility: HOSPITAL | Age: 58
DRG: 572 | End: 2023-10-31
Payer: COMMERCIAL

## 2023-10-31 ENCOUNTER — OFFICE VISIT (OUTPATIENT)
Dept: WOUND CARE | Facility: HOSPITAL | Age: 58
DRG: 572 | End: 2023-10-31
Payer: COMMERCIAL

## 2023-10-31 ENCOUNTER — DOCUMENTATION (OUTPATIENT)
Dept: OCCUPATIONAL MEDICINE | Facility: HOSPITAL | Age: 58
End: 2023-10-31
Payer: COMMERCIAL

## 2023-10-31 DIAGNOSIS — N64.89 SEROMA OF BREAST: ICD-10-CM

## 2023-10-31 DIAGNOSIS — N61.1 ABSCESS OF BREAST: ICD-10-CM

## 2023-10-31 DIAGNOSIS — N61.0 CELLULITIS OF LEFT BREAST: Primary | ICD-10-CM

## 2023-10-31 DIAGNOSIS — M86.179: ICD-10-CM

## 2023-10-31 LAB
ALBUMIN SERPL-MCNC: 4.1 G/DL (ref 3.5–5)
ALP BLD-CCNC: 181 U/L (ref 35–125)
ALT SERPL-CCNC: 21 U/L (ref 5–40)
ANION GAP SERPL CALC-SCNC: 13 MMOL/L
APPEARANCE UR: CLEAR
AST SERPL-CCNC: 33 U/L (ref 5–40)
ATRIAL RATE: 88 BPM
BASOPHILS # BLD AUTO: 0.02 X10*3/UL (ref 0–0.1)
BASOPHILS NFR BLD AUTO: 0.4 %
BILIRUB SERPL-MCNC: 1.4 MG/DL (ref 0.1–1.2)
BILIRUB UR STRIP.AUTO-MCNC: NEGATIVE MG/DL
BUN SERPL-MCNC: 12 MG/DL (ref 8–25)
CALCIUM SERPL-MCNC: 9.4 MG/DL (ref 8.5–10.4)
CHLORIDE SERPL-SCNC: 102 MMOL/L (ref 97–107)
CK SERPL-CCNC: 39 U/L (ref 24–195)
CO2 SERPL-SCNC: 23 MMOL/L (ref 24–31)
COLOR UR: ABNORMAL
CREAT SERPL-MCNC: 0.5 MG/DL (ref 0.4–1.6)
EOSINOPHIL # BLD AUTO: 0.12 X10*3/UL (ref 0–0.7)
EOSINOPHIL NFR BLD AUTO: 2.3 %
ERYTHROCYTE [DISTWIDTH] IN BLOOD BY AUTOMATED COUNT: 13.7 % (ref 11.5–14.5)
GFR SERPL CREATININE-BSD FRML MDRD: >90 ML/MIN/1.73M*2
GLUCOSE BLD MANUAL STRIP-MCNC: 100 MG/DL (ref 74–99)
GLUCOSE BLD MANUAL STRIP-MCNC: 156 MG/DL (ref 74–99)
GLUCOSE SERPL-MCNC: 113 MG/DL (ref 65–99)
GLUCOSE UR STRIP.AUTO-MCNC: NORMAL MG/DL
HCT VFR BLD AUTO: 34.8 % (ref 36–46)
HGB BLD-MCNC: 11.1 G/DL (ref 12–16)
IMM GRANULOCYTES # BLD AUTO: 0.02 X10*3/UL (ref 0–0.7)
IMM GRANULOCYTES NFR BLD AUTO: 0.4 % (ref 0–0.9)
KETONES UR STRIP.AUTO-MCNC: NEGATIVE MG/DL
LACTATE BLDV-SCNC: 1.1 MMOL/L (ref 0.4–2)
LEUKOCYTE ESTERASE UR QL STRIP.AUTO: ABNORMAL
LYMPHOCYTES # BLD AUTO: 1.06 X10*3/UL (ref 1.2–4.8)
LYMPHOCYTES NFR BLD AUTO: 20.6 %
MCH RBC QN AUTO: 32.4 PG (ref 26–34)
MCHC RBC AUTO-ENTMCNC: 31.9 G/DL (ref 32–36)
MCV RBC AUTO: 102 FL (ref 80–100)
MONOCYTES # BLD AUTO: 0.53 X10*3/UL (ref 0.1–1)
MONOCYTES NFR BLD AUTO: 10.3 %
NEUTROPHILS # BLD AUTO: 3.39 X10*3/UL (ref 1.2–7.7)
NEUTROPHILS NFR BLD AUTO: 66 %
NITRITE UR QL STRIP.AUTO: NEGATIVE
NRBC BLD-RTO: 0 /100 WBCS (ref 0–0)
P AXIS: 60 DEGREES
P OFFSET: 197 MS
P ONSET: 132 MS
PH UR STRIP.AUTO: 6.5 [PH]
PLATELET # BLD AUTO: 87 X10*3/UL (ref 150–450)
PMV BLD AUTO: 11.4 FL (ref 7.5–11.5)
POLYCHROMASIA BLD QL SMEAR: NORMAL
POTASSIUM SERPL-SCNC: 3.8 MMOL/L (ref 3.4–5.1)
PR INTERVAL: 168 MS
PROT SERPL-MCNC: 7.9 G/DL (ref 5.9–7.9)
PROT UR STRIP.AUTO-MCNC: NEGATIVE MG/DL
Q ONSET: 216 MS
QRS COUNT: 14 BEATS
QRS DURATION: 88 MS
QT INTERVAL: 366 MS
QTC CALCULATION(BAZETT): 442 MS
QTC FREDERICIA: 416 MS
R AXIS: 62 DEGREES
RBC # BLD AUTO: 3.43 X10*6/UL (ref 4–5.2)
RBC # UR STRIP.AUTO: NEGATIVE /UL
RBC #/AREA URNS AUTO: ABNORMAL /HPF
RBC MORPH BLD: NORMAL
SODIUM SERPL-SCNC: 138 MMOL/L (ref 133–145)
SP GR UR STRIP.AUTO: 1.01
SPHEROCYTES BLD QL SMEAR: NORMAL
SQUAMOUS #/AREA URNS AUTO: ABNORMAL /HPF
STOMATOCYTES BLD QL SMEAR: NORMAL
T AXIS: 33 DEGREES
T OFFSET: 399 MS
UROBILINOGEN UR STRIP.AUTO-MCNC: NORMAL MG/DL
VENTRICULAR RATE: 88 BPM
WBC # BLD AUTO: 5.1 X10*3/UL (ref 4.4–11.3)
WBC #/AREA URNS AUTO: ABNORMAL /HPF

## 2023-10-31 PROCEDURE — 76642 ULTRASOUND BREAST LIMITED: CPT | Mod: LT

## 2023-10-31 PROCEDURE — 87040 BLOOD CULTURE FOR BACTERIA: CPT | Mod: TRILAB

## 2023-10-31 PROCEDURE — 2500000004 HC RX 250 GENERAL PHARMACY W/ HCPCS (ALT 636 FOR OP/ED): Performed by: SPECIALIST

## 2023-10-31 PROCEDURE — 82962 GLUCOSE BLOOD TEST: CPT | Mod: 91

## 2023-10-31 PROCEDURE — 2500000004 HC RX 250 GENERAL PHARMACY W/ HCPCS (ALT 636 FOR OP/ED)

## 2023-10-31 PROCEDURE — 87086 URINE CULTURE/COLONY COUNT: CPT | Mod: TRILAB

## 2023-10-31 PROCEDURE — G0378 HOSPITAL OBSERVATION PER HR: HCPCS

## 2023-10-31 PROCEDURE — 93005 ELECTROCARDIOGRAM TRACING: CPT

## 2023-10-31 PROCEDURE — 87181 SC STD AGAR DILUTION PER AGT: CPT | Mod: TRILAB

## 2023-10-31 PROCEDURE — 2500000001 HC RX 250 WO HCPCS SELF ADMINISTERED DRUGS (ALT 637 FOR MEDICARE OP): Performed by: SPECIALIST

## 2023-10-31 PROCEDURE — 82947 ASSAY GLUCOSE BLOOD QUANT: CPT

## 2023-10-31 PROCEDURE — 80053 COMPREHEN METABOLIC PANEL: CPT

## 2023-10-31 PROCEDURE — 36415 COLL VENOUS BLD VENIPUNCTURE: CPT

## 2023-10-31 PROCEDURE — 81001 URINALYSIS AUTO W/SCOPE: CPT

## 2023-10-31 PROCEDURE — 99285 EMERGENCY DEPT VISIT HI MDM: CPT | Mod: 25 | Performed by: EMERGENCY MEDICINE

## 2023-10-31 PROCEDURE — 85025 COMPLETE CBC W/AUTO DIFF WBC: CPT

## 2023-10-31 PROCEDURE — 87075 CULTR BACTERIA EXCEPT BLOOD: CPT | Mod: TRILAB

## 2023-10-31 PROCEDURE — 96365 THER/PROPH/DIAG IV INF INIT: CPT

## 2023-10-31 PROCEDURE — G0277 HBOT, FULL BODY CHAMBER, 30M: HCPCS

## 2023-10-31 PROCEDURE — 82550 ASSAY OF CK (CPK): CPT | Performed by: INTERNAL MEDICINE

## 2023-10-31 PROCEDURE — 83605 ASSAY OF LACTIC ACID: CPT

## 2023-10-31 RX ORDER — VANCOMYCIN 1 G/200ML
1 INJECTION, SOLUTION INTRAVENOUS ONCE
Status: DISCONTINUED | OUTPATIENT
Start: 2023-10-31 | End: 2023-10-31

## 2023-10-31 RX ORDER — ACETAMINOPHEN 325 MG/1
650 TABLET ORAL ONCE
Status: COMPLETED | OUTPATIENT
Start: 2023-10-31 | End: 2023-10-31

## 2023-10-31 RX ORDER — DEXTROSE, SODIUM CHLORIDE, SODIUM LACTATE, POTASSIUM CHLORIDE, AND CALCIUM CHLORIDE 5; .6; .31; .03; .02 G/100ML; G/100ML; G/100ML; G/100ML; G/100ML
100 INJECTION, SOLUTION INTRAVENOUS CONTINUOUS
Status: DISCONTINUED | OUTPATIENT
Start: 2023-10-31 | End: 2023-11-03 | Stop reason: HOSPADM

## 2023-10-31 RX ORDER — DAPTOMYCIN IN SODIUM CHLORIDE 500 MG/50ML
500 INJECTION, SOLUTION INTRAVENOUS
Status: DISCONTINUED | OUTPATIENT
Start: 2023-11-01 | End: 2023-11-03 | Stop reason: HOSPADM

## 2023-10-31 RX ORDER — ONDANSETRON 4 MG/1
4 TABLET, ORALLY DISINTEGRATING ORAL ONCE
Status: COMPLETED | OUTPATIENT
Start: 2023-10-31 | End: 2023-11-01

## 2023-10-31 RX ORDER — GABAPENTIN 300 MG/1
600 CAPSULE ORAL ONCE
Status: COMPLETED | OUTPATIENT
Start: 2023-10-31 | End: 2023-10-31

## 2023-10-31 RX ORDER — CELECOXIB 200 MG/1
400 CAPSULE ORAL ONCE
Status: DISCONTINUED | OUTPATIENT
Start: 2023-10-31 | End: 2023-11-03 | Stop reason: HOSPADM

## 2023-10-31 RX ORDER — OXYCODONE AND ACETAMINOPHEN 5; 325 MG/1; MG/1
1 TABLET ORAL EVERY 6 HOURS PRN
Status: DISCONTINUED | OUTPATIENT
Start: 2023-10-31 | End: 2023-11-02

## 2023-10-31 RX ORDER — CEFUROXIME SODIUM 1.5 G/16ML
750 INJECTION, POWDER, FOR SOLUTION INTRAVENOUS EVERY 8 HOURS
Status: DISCONTINUED | OUTPATIENT
Start: 2023-10-31 | End: 2023-10-31

## 2023-10-31 RX ORDER — DEXTROSE, SODIUM CHLORIDE, SODIUM LACTATE, POTASSIUM CHLORIDE, AND CALCIUM CHLORIDE 5; .6; .31; .03; .02 G/100ML; G/100ML; G/100ML; G/100ML; G/100ML
50 INJECTION, SOLUTION INTRAVENOUS CONTINUOUS
Status: DISCONTINUED | OUTPATIENT
Start: 2023-10-31 | End: 2023-11-01

## 2023-10-31 RX ORDER — DAPTOMYCIN IN SODIUM CHLORIDE 500 MG/50ML
500 INJECTION, SOLUTION INTRAVENOUS ONCE
Status: COMPLETED | OUTPATIENT
Start: 2023-10-31 | End: 2023-10-31

## 2023-10-31 RX ADMIN — CEFEPIME 2 G: 2 INJECTION, POWDER, FOR SOLUTION INTRAVENOUS at 15:37

## 2023-10-31 RX ADMIN — DAPTOMYCIN IN SODIUM CHLORIDE 500 MG: 500 INJECTION, SOLUTION INTRAVENOUS at 14:24

## 2023-10-31 RX ADMIN — CEFEPIME 2 G: 2 INJECTION, POWDER, FOR SOLUTION INTRAVENOUS at 22:12

## 2023-10-31 RX ADMIN — OXYCODONE AND ACETAMINOPHEN 1 TABLET: 5; 325 TABLET ORAL at 18:52

## 2023-10-31 RX ADMIN — SODIUM CHLORIDE, SODIUM LACTATE, POTASSIUM CHLORIDE, CALCIUM CHLORIDE, AND DEXTROSE MONOHYDRATE 100 ML/HR: 600; 310; 30; 20; 5 INJECTION, SOLUTION INTRAVENOUS at 23:50

## 2023-10-31 RX ADMIN — GABAPENTIN 600 MG: 300 CAPSULE ORAL at 15:58

## 2023-10-31 RX ADMIN — SODIUM CHLORIDE, SODIUM LACTATE, POTASSIUM CHLORIDE, CALCIUM CHLORIDE, AND DEXTROSE MONOHYDRATE 100 ML/HR: 600; 310; 30; 20; 5 INJECTION, SOLUTION INTRAVENOUS at 16:17

## 2023-10-31 RX ADMIN — ACETAMINOPHEN 650 MG: 325 TABLET ORAL at 15:59

## 2023-10-31 SDOH — SOCIAL STABILITY: SOCIAL INSECURITY: HAS ANYONE EVER THREATENED TO HURT YOUR FAMILY OR YOUR PETS?: NO

## 2023-10-31 SDOH — SOCIAL STABILITY: SOCIAL INSECURITY: ARE THERE ANY APPARENT SIGNS OF INJURIES/BEHAVIORS THAT COULD BE RELATED TO ABUSE/NEGLECT?: NO

## 2023-10-31 SDOH — SOCIAL STABILITY: SOCIAL INSECURITY: DO YOU FEEL ANYONE HAS EXPLOITED OR TAKEN ADVANTAGE OF YOU FINANCIALLY OR OF YOUR PERSONAL PROPERTY?: NO

## 2023-10-31 SDOH — SOCIAL STABILITY: SOCIAL INSECURITY: DO YOU FEEL UNSAFE GOING BACK TO THE PLACE WHERE YOU ARE LIVING?: NO

## 2023-10-31 SDOH — SOCIAL STABILITY: SOCIAL INSECURITY: HAVE YOU HAD THOUGHTS OF HARMING ANYONE ELSE?: NO

## 2023-10-31 SDOH — SOCIAL STABILITY: SOCIAL INSECURITY: WERE YOU ABLE TO COMPLETE ALL THE BEHAVIORAL HEALTH SCREENINGS?: YES

## 2023-10-31 SDOH — SOCIAL STABILITY: SOCIAL INSECURITY: ARE YOU OR HAVE YOU BEEN THREATENED OR ABUSED PHYSICALLY, EMOTIONALLY, OR SEXUALLY BY ANYONE?: NO

## 2023-10-31 SDOH — SOCIAL STABILITY: SOCIAL INSECURITY: ABUSE: ADULT

## 2023-10-31 SDOH — SOCIAL STABILITY: SOCIAL INSECURITY: DOES ANYONE TRY TO KEEP YOU FROM HAVING/CONTACTING OTHER FRIENDS OR DOING THINGS OUTSIDE YOUR HOME?: NO

## 2023-10-31 ASSESSMENT — COGNITIVE AND FUNCTIONAL STATUS - GENERAL
PATIENT BASELINE BEDBOUND: NO
DAILY ACTIVITIY SCORE: 24
MOBILITY SCORE: 24

## 2023-10-31 ASSESSMENT — LIFESTYLE VARIABLES
HOW MANY STANDARD DRINKS CONTAINING ALCOHOL DO YOU HAVE ON A TYPICAL DAY: PATIENT DOES NOT DRINK
HOW OFTEN DO YOU HAVE A DRINK CONTAINING ALCOHOL: NEVER
SUBSTANCE_ABUSE_PAST_12_MONTHS: NO
PRESCIPTION_ABUSE_PAST_12_MONTHS: NO
AUDIT-C TOTAL SCORE: 0
AUDIT-C TOTAL SCORE: 0
SKIP TO QUESTIONS 9-10: 1
HOW OFTEN DO YOU HAVE 6 OR MORE DRINKS ON ONE OCCASION: NEVER

## 2023-10-31 ASSESSMENT — PAIN DESCRIPTION - LOCATION: LOCATION: BREAST

## 2023-10-31 ASSESSMENT — PAIN - FUNCTIONAL ASSESSMENT
PAIN_FUNCTIONAL_ASSESSMENT: 0-10

## 2023-10-31 ASSESSMENT — PATIENT HEALTH QUESTIONNAIRE - PHQ9
2. FEELING DOWN, DEPRESSED OR HOPELESS: NOT AT ALL
SUM OF ALL RESPONSES TO PHQ9 QUESTIONS 1 & 2: 0
1. LITTLE INTEREST OR PLEASURE IN DOING THINGS: NOT AT ALL

## 2023-10-31 ASSESSMENT — COLUMBIA-SUICIDE SEVERITY RATING SCALE - C-SSRS
2. HAVE YOU ACTUALLY HAD ANY THOUGHTS OF KILLING YOURSELF?: NO
1. IN THE PAST MONTH, HAVE YOU WISHED YOU WERE DEAD OR WISHED YOU COULD GO TO SLEEP AND NOT WAKE UP?: NO
6. HAVE YOU EVER DONE ANYTHING, STARTED TO DO ANYTHING, OR PREPARED TO DO ANYTHING TO END YOUR LIFE?: NO

## 2023-10-31 ASSESSMENT — ACTIVITIES OF DAILY LIVING (ADL)
BATHING: INDEPENDENT
DRESSING YOURSELF: INDEPENDENT
TOILETING: INDEPENDENT
ADEQUATE_TO_COMPLETE_ADL: YES
HEARING - LEFT EAR: FUNCTIONAL
GROOMING: INDEPENDENT
LACK_OF_TRANSPORTATION: NO
HEARING - RIGHT EAR: FUNCTIONAL
PATIENT'S MEMORY ADEQUATE TO SAFELY COMPLETE DAILY ACTIVITIES?: YES
FEEDING YOURSELF: INDEPENDENT
JUDGMENT_ADEQUATE_SAFELY_COMPLETE_DAILY_ACTIVITIES: YES
WALKS IN HOME: INDEPENDENT

## 2023-10-31 ASSESSMENT — PAIN SCALES - GENERAL
PAINLEVEL_OUTOF10: 3
PAINLEVEL_OUTOF10: 2
PAINLEVEL_OUTOF10: 3
PAINLEVEL_OUTOF10: 7
PAINLEVEL_OUTOF10: 3

## 2023-10-31 ASSESSMENT — PAIN DESCRIPTION - FREQUENCY: FREQUENCY: CONSTANT/CONTINUOUS

## 2023-10-31 ASSESSMENT — PAIN DESCRIPTION - PROGRESSION: CLINICAL_PROGRESSION: GRADUALLY WORSENING

## 2023-10-31 ASSESSMENT — PAIN DESCRIPTION - ORIENTATION: ORIENTATION: LEFT

## 2023-10-31 NOTE — PROGRESS NOTES
Attestation note/supervisory note for SHANNON Hernandez    The patient is a 58 female presenting to the emergency department for relation of a postoperative infection of the left breast.  The patient states that she was diagnosed with breast cancer and because of a significant family history of breast cancer and she elected for bilateral mastectomies.  This was done on 13 September 2023 by Dr. Dorman.   She noticed some redness with discoloration of the left breast over the past several days.  She was started on doxycycline yesterday for her symptoms but it was worsening and she was sent to the emergency room today by Dr. Dorman to be admitted for further management of her infection.  She denies any headache or visual changes.  No chest pain.  No shortness of breath.  No palpitations.  No fever or chills.  No cough or congestion.  No abdominal pain.  No nausea or vomiting.  No diarrhea or constipation but no urinary complaints.  All pertinent positives and negatives are recorded above.  All other systems reviewed and otherwise negative.  Vital signs within normal limits.  Physical exam with a well-nourished well-developed female in no acute distress.  HEENT exam within normal limits.  She has no evidence of airway compromise or respiratory distress.  Abdominal exam is benign.  She has no gross motor, neurologic or vascular deficits on exam.  She does have erythema with warmth and induration of the left breast.  Also some evidence of necrosis around the nipple of the left breast.      EKG with normal sinus rhythm at 80 bpm, normal axis, normal voltage, normal ST segment, normal T waves      Diagnostic labs with mild anemia, thrombocytopenia, mild electrolyte imbalance and evidence of a possible urinary tract infection but otherwise unremarkable.      Initial lactic acid 1.1.   Repeat lactic pending at the time of admission     Breast ultrasound  IMPRESSION:  Elongated fluid collection adjacent to the left breast  implant,  without associated surrounding hyperemia, most likely postsurgical  seroma. No definite drainable abscess visualized.    The case was discussed with her surgeon, Dr. Dorman and he requested that the patient be admitted for IV antibiotics including cefepime and daptomycin.      The patient does not have any evidence of sepsis but she does have evidence of postoperative infection of the left breast with fluid collection.      I personally saw the patient and performed a substantive portion of the visit including all aspects of the medical decision making.      I reviewed the results of the diagnostic labs and diagnostic imaging.  Formal radiology reading was completed by the radiologist      Karla Quezada MD

## 2023-10-31 NOTE — ED PROVIDER NOTES
HPI   Chief Complaint   Patient presents with    Wound Infection     Pt sent in by dr burgess rt left breast infection due to recent breast implant surgery on 9/13/23, nipple is black in appearance, denies any other s/s. Started oral abx yesterday without relief. Already has right foot wound that is covered        Patient is a 58-year-old female presenting to the emergency department sent by plastic surgeon Dr. Burgess.  She was seen in his office today and there is concern for possible infection of her left breast.  She had breast implant [employer] on 9/13/2023.  Over the past few days she has noticed some redness and tenderness to the left breast.  She also noticed discoloration to the nipple.  Patient was seen in Dr. Burgess's office today and was sent here for a sepsis work-up, IV antibiotics and likely admission.  She denies any chest pain, shortness of breath, fever, chills, nausea, vomiting, abdominal pain, recent travel, recent illness, numbness, tingling.  She does admit to some tenderness in the left breast.                          No data recorded                Patient History   Past Medical History:   Diagnosis Date    Contact with and (suspected) exposure to covid-19 03/11/2021    Suspected COVID-19 virus infection    Neoplasm of unspecified behavior of bone, soft tissue, and skin 08/22/2016    Skin growth    Other mucopurulent conjunctivitis, unspecified eye     Pink eye    Personal history of other benign neoplasm     History of uterine leiomyoma     Past Surgical History:   Procedure Laterality Date    HYSTERECTOMY  05/31/2016    Hysterectomy    OTHER SURGICAL HISTORY  08/12/2021    Vascular surgical procedure    TONSILLECTOMY  05/31/2016    Tonsillectomy     Family History   Problem Relation Name Age of Onset    Diabetes Mother      Cancer Other          Grandparent    Diabetes Other          Grandparent    Cancer Other          Aunt    Diabetes Sibling      Neuropathy Sibling       Social  History     Tobacco Use    Smoking status: Never    Smokeless tobacco: Never   Substance Use Topics    Alcohol use: Yes     Alcohol/week: 2.0 - 3.0 standard drinks of alcohol     Types: 2 - 3 Glasses of wine per week    Drug use: Never       Physical Exam   ED Triage Vitals [10/31/23 1152]   Temp Heart Rate Resp BP   37.2 °C (99 °F) 94 16 154/80      SpO2 Temp Source Heart Rate Source Patient Position   98 % Tympanic Monitor --      BP Location FiO2 (%)     -- --       Physical Exam  Vitals and nursing note reviewed. Exam conducted with a chaperone present.   Constitutional:       Appearance: Normal appearance.   HENT:      Head: Normocephalic and atraumatic.      Nose: Nose normal.      Mouth/Throat:      Mouth: Mucous membranes are moist.   Eyes:      Extraocular Movements: Extraocular movements intact.      Conjunctiva/sclera: Conjunctivae normal.      Pupils: Pupils are equal, round, and reactive to light.   Cardiovascular:      Rate and Rhythm: Regular rhythm. Tachycardia present.      Pulses: Normal pulses.      Heart sounds: Normal heart sounds.   Pulmonary:      Effort: Pulmonary effort is normal.      Breath sounds: Normal breath sounds. No wheezing, rhonchi or rales.   Abdominal:      General: Abdomen is flat. Bowel sounds are normal.      Palpations: Abdomen is soft.      Tenderness: There is no abdominal tenderness. There is no guarding or rebound.   Musculoskeletal:         General: Normal range of motion.      Cervical back: Normal range of motion and neck supple.   Skin:     General: Skin is warm and dry.      Comments: Erythema and swelling to the left breast with necrosis to the left nipple.   Neurological:      General: No focal deficit present.      Mental Status: She is alert and oriented to person, place, and time.   Psychiatric:         Mood and Affect: Mood normal.         Behavior: Behavior normal.         ED Course & MDM   ED Course as of 10/31/23 1503   Tue Oct 31, 2023   1325 Patient was  discussed with Dr. Dorman who requested we place the patient on vancomycin and cefuroxime.  Unfortunately the hospital does not have cefuroxime and the patient had a reaction to vancomycin when given.  I spoke with infectious disease who was also consulted and they recommended placing the patient on daptomycin and cefepime. [AJ]      ED Course User Index  [AJ] Magnolia Hernandez PA-C         Diagnoses as of 10/31/23 1503   Seroma of breast       Medical Decision Making  Parts of this chart have been completed using voice recognition software. Please excuse any errors of transcription. Despite the medical decision making time stamp above-my medical decision making has taken place during the patient's entire visit. My thought process and reason for plan has been formulated from the time that I saw the patient until the time of disposition and is not specific to one specific moment during their visit and furthermore my MDM encompasses this entire chart and not only this text box.    Patient seen in conjunction with attending physician .     HPI: Detailed above.    Exam: A medically appropriate exam performed, outlined above, given the known history and presentation.    History obtained from: Patient    EKG: Reviewed by my attending physician    Social Determinants of Health considered during this visit: Lives at home    Labs/Diagnostics:  Labs Reviewed   CBC WITH AUTO DIFFERENTIAL - Abnormal       Result Value    WBC 5.1      nRBC 0.0      RBC 3.43 (*)     Hemoglobin 11.1 (*)     Hematocrit 34.8 (*)      (*)     MCH 32.4      MCHC 31.9 (*)     RDW 13.7      Platelets 87 (*)     MPV 11.4      Neutrophils % 66.0      Immature Granulocytes %, Automated 0.4      Lymphocytes % 20.6      Monocytes % 10.3      Eosinophils % 2.3      Basophils % 0.4      Neutrophils Absolute 3.39      Immature Granulocytes Absolute, Automated 0.02      Lymphocytes Absolute 1.06 (*)     Monocytes Absolute 0.53      Eosinophils  Absolute 0.12      Basophils Absolute 0.02     COMPREHENSIVE METABOLIC PANEL - Abnormal    Glucose 113 (*)     Sodium 138      Potassium 3.8      Chloride 102      Bicarbonate 23 (*)     Urea Nitrogen 12      Creatinine 0.50      eGFR >90      Calcium 9.4      Albumin 4.1      Alkaline Phosphatase 181 (*)     Total Protein 7.9      AST 33      Bilirubin, Total 1.4 (*)     ALT 21      Anion Gap 13     URINALYSIS WITH REFLEX MICROSCOPIC AND CULTURE - Abnormal    Color, Urine Light-Yellow      Appearance, Urine Clear      Specific Gravity, Urine 1.007      pH, Urine 6.5      Protein, Urine NEGATIVE      Glucose, Urine Normal      Blood, Urine NEGATIVE      Ketones, Urine NEGATIVE      Bilirubin, Urine NEGATIVE      Urobilinogen, Urine Normal      Nitrite, Urine NEGATIVE      Leukocyte Esterase, Urine 75 Russel/µL (*)    MICROSCOPIC ONLY, URINE - Abnormal    WBC, Urine 6-10 (*)     RBC, Urine 1-2      Squamous Epithelial Cells, Urine 1-9 (SPARSE)     BLOOD GAS LACTIC ACID, VENOUS - Normal    POCT Lactate, Venous 1.1     BLOOD CULTURE   BLOOD CULTURE   URINE CULTURE   TISSUE/WOUND CULTURE/SMEAR   URINALYSIS WITH REFLEX MICROSCOPIC AND CULTURE    Narrative:     The following orders were created for panel order Urinalysis with Reflex Microscopic and Culture.  Procedure                               Abnormality         Status                     ---------                               -----------         ------                     Urinalysis with Reflex M...[[id number]]  Abnormal            Final result               Extra Urine Gray Tube[[id number]]                                                         Please view results for these tests on the individual orders.   EXTRA URINE GRAY TUBE   MORPHOLOGY    RBC Morphology See Below      Polychromasia Mild      Spherocytes Few      Stomatocytes Few       BI US breast limited left   Final Result   Elongated fluid collection adjacent to the left breast implant,   without associated  surrounding hyperemia, most likely postsurgical   seroma. No definite drainable abscess visualized.        ACR BI-RADS 2:  Benign finding.        Correlate clinically and follow-up as needed.        MACRO:   None.        Signed by: Lucien Martínez 10/31/2023 2:16 PM   Dictation workstation:   [id number]         Medications given during visit: Daptomycin and cefepime    Considerations/further MDM:  Patient is a 58-year-old female presenting to the emergency department for evaluation of left breast pain, swelling and nipple necrosis.  On physical exam vital signs remarkable for hypertension and tachycardia but otherwise stable and patient is in no acute distress.  Sepsis work-up initiated which was requested by Dr. Dorman.  Patient also started on IV antibiotics including cefepime and daptomycin.  CBC shows no evidence of leukocytosis however there is mild anemia no transfusions needed.  Ultrasound showed a seroma in the left with no definite drainable abscess.  Patient was discussed again with Dr. Dorman who said that he will take the patient to the OR tomorrow for a washout procedure.  He admitted the patient to his service.  He has that he put in an order for D5 LR.    Procedure  Procedures     Magnolia Hernandez PA-C  10/31/23 1506       Magnolia Hernandez PA-C  10/31/23 1506

## 2023-10-31 NOTE — H&P
This 58-year-old female underwent immediate prepectoral breast reconstruction with a [place] type procedure for nipple areolar complex preservation.  She has been well since her 913 surgery.  She is required hyperbaric oxygen for venous and possible arterial occlusion of her bilateral nipple-areolar areolar complexes using topical agents solely for wound care.  She has had no signs of infection cellulitis or erythema.  Her right nipple areolar complex is now healed.  Today she came to my office with a concern that since yesterday she had florid erythema her left breast with peau d'orange.  Her wound care specialist at the hyperbaric oxygen chamber put her on doxycycline and she has received 2 doses seen him.  She has no constitutional symptoms.  Because of the threat to her implant reconstruction I have asked her to immediately return to the hospital at the emergency room to get imaging for periprosthetic fluid and IV antibiotic administration.  She understands they may return her to the operating room tomorrow for washout and debridement of this tissue necrosis that has been stably remodeling since her vascular compromise after surgery.  Her past medical history is well described in her presurgical record.  Her medications of the same.  I will ask infectious disease Dr. Ashley Paredes to see and evaluate the patient for IV antibiotics.  Aced upon the findings of tomorrow's procedure she may lose her implant reconstruction with debridement and placement of drains.  She also knows having reviewed this since the time of her initial tissue healing delay that she will be able to return to the operating room for subsequent flap reconstruction after the period of infectious managed.  Laboratories physical examination demonstrates peau d'orange of the left lower pole of her breast.  Left nipple areolar complex that is partial-thickness necrosis.  She has an area of collagen exposure there is a new dehiscence of her nipple  areolar complex from her skin flaps of her mastectomy.  There is no untoward swelling bruising purpura.  She has no axillary adenopathy.  She has no fluctuance or purulent drainage.  Her right breast has demonstrably no wound healing issues.  The her trunk is satisfactory 11 system review is negative.

## 2023-10-31 NOTE — PROGRESS NOTES
Patient in the ER has received IV antibiotics.  She has no constitutional symptoms.  Since seen in the office she has undertaken an ultrasound that demonstrates a fluid periprosthetic collection.  Because of her diabetes cellulitis peau d'orange and appearance of her left breast that is changed she will be added on for the surgical schedule tomorrow.  I have asked the computer to respond to surgical scheduling but have thus far received no response for her add-on surgery to be performed tomorrow after my add-on cases.  The patient and her spouse and I have discussed the possibilities including salvage of the implant, explantation and removal of ADM.  We discussed the possibilities of reconstituting her implant reconstruction in a delayed fashion should she require explantation.  Intercurrently she is suffering from 3 years of an open wound of her toe which may be a confounding variable in this patient with a history of diabetes.  She still has an open wound on her toe after some years.  The patient will be n.p.o. after midnight and we will reassess in a.m. for surgical timing.

## 2023-11-01 ENCOUNTER — ANESTHESIA EVENT (OUTPATIENT)
Dept: OPERATING ROOM | Facility: HOSPITAL | Age: 58
DRG: 572 | End: 2023-11-01
Payer: COMMERCIAL

## 2023-11-01 ENCOUNTER — ANESTHESIA (OUTPATIENT)
Dept: OPERATING ROOM | Facility: HOSPITAL | Age: 58
DRG: 572 | End: 2023-11-01
Payer: COMMERCIAL

## 2023-11-01 ENCOUNTER — APPOINTMENT (OUTPATIENT)
Dept: WOUND CARE | Facility: HOSPITAL | Age: 58
End: 2023-11-01
Payer: COMMERCIAL

## 2023-11-01 DIAGNOSIS — Z17.0 MALIGNANT NEOPLASM OF LEFT BREAST IN FEMALE, ESTROGEN RECEPTOR POSITIVE, UNSPECIFIED SITE OF BREAST (MULTI): Primary | ICD-10-CM

## 2023-11-01 DIAGNOSIS — C50.912 MALIGNANT NEOPLASM OF LEFT BREAST IN FEMALE, ESTROGEN RECEPTOR POSITIVE, UNSPECIFIED SITE OF BREAST (MULTI): Primary | ICD-10-CM

## 2023-11-01 LAB
GLUCOSE BLD MANUAL STRIP-MCNC: 102 MG/DL (ref 74–99)
GLUCOSE BLD MANUAL STRIP-MCNC: 117 MG/DL (ref 74–99)

## 2023-11-01 PROCEDURE — 3600000002 HC OR TIME - INITIAL BASE CHARGE - PROCEDURE LEVEL TWO: Performed by: SPECIALIST

## 2023-11-01 PROCEDURE — 2500000004 HC RX 250 GENERAL PHARMACY W/ HCPCS (ALT 636 FOR OP/ED): Performed by: ANESTHESIOLOGY

## 2023-11-01 PROCEDURE — 87075 CULTR BACTERIA EXCEPT BLOOD: CPT | Mod: TRILAB | Performed by: SPECIALIST

## 2023-11-01 PROCEDURE — 3700000002 HC GENERAL ANESTHESIA TIME - EACH INCREMENTAL 1 MINUTE: Performed by: SPECIALIST

## 2023-11-01 PROCEDURE — 0JB60ZZ EXCISION OF CHEST SUBCUTANEOUS TISSUE AND FASCIA, OPEN APPROACH: ICD-10-PCS | Performed by: SPECIALIST

## 2023-11-01 PROCEDURE — 2500000005 HC RX 250 GENERAL PHARMACY W/O HCPCS: Performed by: SPECIALIST

## 2023-11-01 PROCEDURE — 2500000004 HC RX 250 GENERAL PHARMACY W/ HCPCS (ALT 636 FOR OP/ED): Performed by: INTERNAL MEDICINE

## 2023-11-01 PROCEDURE — 3700000001 HC GENERAL ANESTHESIA TIME - INITIAL BASE CHARGE: Performed by: SPECIALIST

## 2023-11-01 PROCEDURE — 7100000002 HC RECOVERY ROOM TIME - EACH INCREMENTAL 1 MINUTE: Performed by: SPECIALIST

## 2023-11-01 PROCEDURE — A19020 PR EXPLO/DRAIN BREAST ABSCESS: Performed by: ANESTHESIOLOGY

## 2023-11-01 PROCEDURE — G0378 HOSPITAL OBSERVATION PER HR: HCPCS

## 2023-11-01 PROCEDURE — 2500000004 HC RX 250 GENERAL PHARMACY W/ HCPCS (ALT 636 FOR OP/ED): Performed by: SPECIALIST

## 2023-11-01 PROCEDURE — 3600000007 HC OR TIME - EACH INCREMENTAL 1 MINUTE - PROCEDURE LEVEL TWO: Performed by: SPECIALIST

## 2023-11-01 PROCEDURE — 2720000007 HC OR 272 NO HCPCS: Performed by: SPECIALIST

## 2023-11-01 PROCEDURE — 87070 CULTURE OTHR SPECIMN AEROBIC: CPT | Mod: TRILAB | Performed by: SPECIALIST

## 2023-11-01 PROCEDURE — A9999 DME SUPPLY OR ACCESSORY, NOS: HCPCS | Performed by: SPECIALIST

## 2023-11-01 PROCEDURE — 7100000001 HC RECOVERY ROOM TIME - INITIAL BASE CHARGE: Performed by: SPECIALIST

## 2023-11-01 PROCEDURE — 2500000001 HC RX 250 WO HCPCS SELF ADMINISTERED DRUGS (ALT 637 FOR MEDICARE OP): Performed by: SPECIALIST

## 2023-11-01 PROCEDURE — 82947 ASSAY GLUCOSE BLOOD QUANT: CPT

## 2023-11-01 PROCEDURE — 0H9X0ZZ DRAINAGE OF LEFT NIPPLE, OPEN APPROACH: ICD-10-PCS | Performed by: SPECIALIST

## 2023-11-01 RX ORDER — DAPTOMYCIN 50 MG/ML
INJECTION, POWDER, LYOPHILIZED, FOR SOLUTION INTRAVENOUS AS NEEDED
Status: DISCONTINUED | OUTPATIENT
Start: 2023-11-01 | End: 2023-11-01

## 2023-11-01 RX ORDER — FENTANYL CITRATE 50 UG/ML
50 INJECTION, SOLUTION INTRAMUSCULAR; INTRAVENOUS EVERY 5 MIN PRN
Status: DISCONTINUED | OUTPATIENT
Start: 2023-11-01 | End: 2023-11-01 | Stop reason: HOSPADM

## 2023-11-01 RX ORDER — LIDOCAINE HYDROCHLORIDE AND EPINEPHRINE 10; 10 MG/ML; UG/ML
INJECTION, SOLUTION INFILTRATION; PERINEURAL AS NEEDED
Status: DISCONTINUED | OUTPATIENT
Start: 2023-11-01 | End: 2023-11-01 | Stop reason: HOSPADM

## 2023-11-01 RX ORDER — DAPTOMYCIN IN SODIUM CHLORIDE 500 MG/50ML
500 INJECTION, SOLUTION INTRAVENOUS
Qty: 1500 ML | Refills: 11
Start: 2023-11-02 | End: 2023-11-15

## 2023-11-01 RX ORDER — BUPIVACAINE HYDROCHLORIDE 5 MG/ML
INJECTION, SOLUTION EPIDURAL; INTRACAUDAL AS NEEDED
Status: DISCONTINUED | OUTPATIENT
Start: 2023-11-01 | End: 2023-11-01 | Stop reason: HOSPADM

## 2023-11-01 RX ORDER — LIDOCAINE HYDROCHLORIDE 10 MG/ML
0.1 INJECTION, SOLUTION EPIDURAL; INFILTRATION; INTRACAUDAL; PERINEURAL ONCE
Status: DISCONTINUED | OUTPATIENT
Start: 2023-11-01 | End: 2023-11-01 | Stop reason: HOSPADM

## 2023-11-01 RX ORDER — FENTANYL CITRATE 50 UG/ML
INJECTION, SOLUTION INTRAMUSCULAR; INTRAVENOUS AS NEEDED
Status: DISCONTINUED | OUTPATIENT
Start: 2023-11-01 | End: 2023-11-01

## 2023-11-01 RX ORDER — GABAPENTIN 300 MG/1
600 CAPSULE ORAL ONCE
Status: DISCONTINUED | OUTPATIENT
Start: 2023-11-01 | End: 2023-11-03 | Stop reason: HOSPADM

## 2023-11-01 RX ORDER — ALBUTEROL SULFATE 0.83 MG/ML
2.5 SOLUTION RESPIRATORY (INHALATION) ONCE
Status: DISCONTINUED | OUTPATIENT
Start: 2023-11-01 | End: 2023-11-01 | Stop reason: HOSPADM

## 2023-11-01 RX ORDER — DAPTOMYCIN IN SODIUM CHLORIDE 500 MG/50ML
500 INJECTION, SOLUTION INTRAVENOUS
Status: DISCONTINUED | OUTPATIENT
Start: 2023-11-01 | End: 2023-11-01

## 2023-11-01 RX ORDER — MIDAZOLAM HYDROCHLORIDE 1 MG/ML
1 INJECTION, SOLUTION INTRAMUSCULAR; INTRAVENOUS ONCE AS NEEDED
Status: DISCONTINUED | OUTPATIENT
Start: 2023-11-01 | End: 2023-11-01 | Stop reason: HOSPADM

## 2023-11-01 RX ORDER — HYDROMORPHONE HYDROCHLORIDE 0.2 MG/ML
0.2 INJECTION INTRAMUSCULAR; INTRAVENOUS; SUBCUTANEOUS EVERY 5 MIN PRN
Status: DISCONTINUED | OUTPATIENT
Start: 2023-11-01 | End: 2023-11-01 | Stop reason: HOSPADM

## 2023-11-01 RX ORDER — PROPOFOL 10 MG/ML
INJECTION, EMULSION INTRAVENOUS AS NEEDED
Status: DISCONTINUED | OUTPATIENT
Start: 2023-11-01 | End: 2023-11-01

## 2023-11-01 RX ORDER — LIDOCAINE HYDROCHLORIDE 10 MG/ML
5 INJECTION, SOLUTION EPIDURAL; INFILTRATION; INTRACAUDAL; PERINEURAL ONCE
Status: DISCONTINUED | OUTPATIENT
Start: 2023-11-01 | End: 2023-11-03 | Stop reason: HOSPADM

## 2023-11-01 RX ORDER — ONDANSETRON HYDROCHLORIDE 2 MG/ML
4 INJECTION, SOLUTION INTRAVENOUS ONCE AS NEEDED
Status: DISCONTINUED | OUTPATIENT
Start: 2023-11-01 | End: 2023-11-01 | Stop reason: HOSPADM

## 2023-11-01 RX ORDER — ACETAMINOPHEN 325 MG/1
650 TABLET ORAL ONCE
Status: DISCONTINUED | OUTPATIENT
Start: 2023-11-01 | End: 2023-11-02

## 2023-11-01 RX ORDER — SODIUM CHLORIDE, SODIUM LACTATE, POTASSIUM CHLORIDE, CALCIUM CHLORIDE 600; 310; 30; 20 MG/100ML; MG/100ML; MG/100ML; MG/100ML
100 INJECTION, SOLUTION INTRAVENOUS CONTINUOUS
Status: DISCONTINUED | OUTPATIENT
Start: 2023-11-01 | End: 2023-11-01 | Stop reason: HOSPADM

## 2023-11-01 RX ORDER — SODIUM CHLORIDE 0.9 % (FLUSH) 0.9 %
10 SYRINGE (ML) INJECTION DAILY
Qty: 90 EACH | Refills: 0 | Status: SHIPPED
Start: 2023-11-01 | End: 2023-11-25 | Stop reason: ENTERED-IN-ERROR

## 2023-11-01 RX ADMIN — FENTANYL CITRATE 50 MCG: 0.05 INJECTION, SOLUTION INTRAMUSCULAR; INTRAVENOUS at 12:31

## 2023-11-01 RX ADMIN — HYDROMORPHONE HYDROCHLORIDE 0.3 MG: 1 INJECTION, SOLUTION INTRAMUSCULAR; INTRAVENOUS; SUBCUTANEOUS at 22:28

## 2023-11-01 RX ADMIN — PROPOFOL 200 MG: 10 INJECTION, EMULSION INTRAVENOUS at 12:09

## 2023-11-01 RX ADMIN — OXYCODONE AND ACETAMINOPHEN 1 TABLET: 5; 325 TABLET ORAL at 16:45

## 2023-11-01 RX ADMIN — FENTANYL CITRATE 50 MCG: 0.05 INJECTION, SOLUTION INTRAMUSCULAR; INTRAVENOUS at 12:09

## 2023-11-01 RX ADMIN — CEFEPIME 2 G: 2 INJECTION, POWDER, FOR SOLUTION INTRAVENOUS at 20:39

## 2023-11-01 RX ADMIN — OXYCODONE AND ACETAMINOPHEN 1 TABLET: 5; 325 TABLET ORAL at 08:39

## 2023-11-01 RX ADMIN — SODIUM CHLORIDE, SODIUM LACTATE, POTASSIUM CHLORIDE, CALCIUM CHLORIDE, AND DEXTROSE MONOHYDRATE 100 ML/HR: 600; 310; 30; 20; 5 INJECTION, SOLUTION INTRAVENOUS at 17:22

## 2023-11-01 RX ADMIN — OXYCODONE AND ACETAMINOPHEN 1 TABLET: 5; 325 TABLET ORAL at 01:23

## 2023-11-01 RX ADMIN — ONDANSETRON 4 MG: 4 TABLET, ORALLY DISINTEGRATING ORAL at 12:49

## 2023-11-01 RX ADMIN — HYDROMORPHONE HYDROCHLORIDE 0.2 MG: 0.2 INJECTION, SOLUTION INTRAMUSCULAR; INTRAVENOUS; SUBCUTANEOUS at 14:34

## 2023-11-01 RX ADMIN — DAPTOMYCIN 500 MG: 500 INJECTION, POWDER, LYOPHILIZED, FOR SOLUTION INTRAVENOUS at 12:06

## 2023-11-01 RX ADMIN — CEFEPIME 2 G: 2 INJECTION, POWDER, FOR SOLUTION INTRAVENOUS at 08:42

## 2023-11-01 RX ADMIN — SODIUM CHLORIDE, SODIUM LACTATE, POTASSIUM CHLORIDE, AND CALCIUM CHLORIDE: 600; 310; 30; 20 INJECTION, SOLUTION INTRAVENOUS at 12:03

## 2023-11-01 RX ADMIN — OXYCODONE AND ACETAMINOPHEN 1 TABLET: 5; 325 TABLET ORAL at 22:03

## 2023-11-01 RX ADMIN — SODIUM CHLORIDE, SODIUM LACTATE, POTASSIUM CHLORIDE, AND CALCIUM CHLORIDE: 600; 310; 30; 20 INJECTION, SOLUTION INTRAVENOUS at 12:22

## 2023-11-01 RX ADMIN — HYDROMORPHONE HYDROCHLORIDE 0.3 MG: 1 INJECTION, SOLUTION INTRAMUSCULAR; INTRAVENOUS; SUBCUTANEOUS at 19:24

## 2023-11-01 ASSESSMENT — ENCOUNTER SYMPTOMS
COUGH: 0
DIAPHORESIS: 0
NAUSEA: 0
SHORTNESS OF BREATH: 0
VOMITING: 0
FEVER: 0
FATIGUE: 0
DIARRHEA: 0
ABDOMINAL PAIN: 0
CHILLS: 0

## 2023-11-01 ASSESSMENT — PAIN - FUNCTIONAL ASSESSMENT
PAIN_FUNCTIONAL_ASSESSMENT: 0-10
PAIN_FUNCTIONAL_ASSESSMENT: FLACC (FACE, LEGS, ACTIVITY, CRY, CONSOLABILITY)
PAIN_FUNCTIONAL_ASSESSMENT: 0-10

## 2023-11-01 ASSESSMENT — COGNITIVE AND FUNCTIONAL STATUS - GENERAL
DAILY ACTIVITIY SCORE: 24
MOBILITY SCORE: 24

## 2023-11-01 ASSESSMENT — PAIN SCALES - GENERAL
PAINLEVEL_OUTOF10: 4
PAINLEVEL_OUTOF10: 6
PAINLEVEL_OUTOF10: 7
PAINLEVEL_OUTOF10: 0 - NO PAIN
PAINLEVEL_OUTOF10: 10 - WORST POSSIBLE PAIN
PAINLEVEL_OUTOF10: 0 - NO PAIN
PAINLEVEL_OUTOF10: 2
PAINLEVEL_OUTOF10: 5 - MODERATE PAIN
PAINLEVEL_OUTOF10: 8
PAINLEVEL_OUTOF10: 10 - WORST POSSIBLE PAIN
PAINLEVEL_OUTOF10: 5 - MODERATE PAIN
PAINLEVEL_OUTOF10: 0 - NO PAIN
PAINLEVEL_OUTOF10: 7
PAINLEVEL_OUTOF10: 9
PAINLEVEL_OUTOF10: 10 - WORST POSSIBLE PAIN
PAINLEVEL_OUTOF10: 0 - NO PAIN
PAINLEVEL_OUTOF10: 7

## 2023-11-01 NOTE — CARE PLAN
Problem: Pain - Adult  Goal: Verbalizes/displays adequate comfort level or baseline comfort level  Outcome: Progressing  Flowsheets (Taken 11/1/2023 0421)  Verbalizes/displays adequate comfort level or baseline comfort level:   Encourage patient to monitor pain and request assistance   Administer analgesics based on type and severity of pain and evaluate response   Assess pain using appropriate pain scale   Implement non-pharmacological measures as appropriate and evaluate response   Notify Licensed Independent Practitioner if interventions unsuccessful or patient reports new pain     Problem: Safety - Adult  Goal: Free from fall injury  Outcome: Progressing  Flowsheets (Taken 11/1/2023 0421)  Free from fall injury: Instruct family/caregiver on patient safety     Problem: Discharge Planning  Goal: Discharge to home or other facility with appropriate resources  Outcome: Progressing  Flowsheets (Taken 11/1/2023 0421)  Discharge to home or other facility with appropriate resources:   Identify barriers to discharge with patient and caregiver   Identify discharge learning needs (meds, wound care, etc)     Problem: Chronic Conditions and Co-morbidities  Goal: Patient's chronic conditions and co-morbidity symptoms are monitored and maintained or improved  Outcome: Progressing  Flowsheets (Taken 11/1/2023 0421)  Care Plan - Patient's Chronic Conditions and Co-Morbidity Symptoms are Monitored and Maintained or Improved:   Monitor and assess patient's chronic conditions and comorbid symptoms for stability, deterioration, or improvement   Update acute care plan with appropriate goals if chronic or comorbid symptoms are exacerbated and prevent overall improvement and discharge     Problem: Pain  Goal: Takes deep breaths with improved pain control throughout the shift  Outcome: Progressing  Goal: Turns in bed with improved pain control throughout the shift  Outcome: Progressing  Goal: Walks with improved pain control  throughout the shift  Outcome: Progressing  Goal: Performs ADL's with improved pain control throughout shift  Outcome: Progressing  Goal: Free from opioid side effects throughout the shift  Outcome: Progressing  Goal: Free from acute confusion related to pain meds throughout the shift  Outcome: Progressing     The patient's goals for the shift include pain management, rest     The clinical goals for the shift include IV antibiotics, pain management, rest      11/01/23 at 4:23 AM - Mandi Jarrell RN

## 2023-11-01 NOTE — PROGRESS NOTES
Sabrina Michaud is a 58 y.o. female on day 0 of admission presenting with Cellulitis of left breast.    Attempted to call patient room to speak with patient regarding discharge planning. No answer, patient possibly off floor. Pt is independent and from home with spouse. Pt is employed.     Anticipate patient will have no skilled needs when medically clear at time of discharge.     Annette Pa RN

## 2023-11-01 NOTE — ANESTHESIA PROCEDURE NOTES
Airway  Date/Time: 11/1/2023 12:11 PM  Urgency: elective      Staffing  Performed: attending   Authorized by: Tim Hicks DO    Performed by: Tim Hicks DO  Patient location during procedure: OR    Indications and Patient Condition  Indications for airway management: anesthesia      Final Airway Details  Final airway type: supraglottic airway

## 2023-11-01 NOTE — PROCEDURES
OP NOTE   Date: 2023  OR Location: [facility]    Name: Sabrina Michaud : 1965, Age: 58 y.o., MRN: [id number], Sex: female    Diagnosis  Pre-op Diagnosis     * Abscess of breast [N61.1] Post-op Diagnosis     * Abscess of breast [N61.1]     Procedures  Debridement left nipple areolar complex 3 x 3 cm  Exploration and drainage abscess 2 sites left breast implant reconstruction  Complex repair 15 cm    Surgeons      * Tim Dorman - Primary    Resident/Fellow/Other Assistant:  Surgeon(s) and Role:    Procedure Summary  Anesthesia: Consult  ASA: II  Anesthesia Staff: Anesthesiologist: Tim Hicks DO  Estimated Blood Loss: 25mL  Intra-op Medications:   Medication Name Total Dose   lidocaine-epinephrine (Xylocaine W/EPI) 1 %-1:100,000 injection 10 mL   bupivacaine PF (Marcaine) 0.5 % (5 mg/mL) injection 10 mL              Anesthesia Record               Intraprocedure I/O Totals          Intake    .00 mL    Propofol Drip 0.00 mL    The total shown is the total volume documented since Anesthesia Start was filed.    Total Intake 900 mL          Specimen:   ID Type Source Tests Collected by Time   A : LEFT BREAST WOUND CULTURE Fluid BREAST CYST FLUID LEFT STERILE FLUID CULTURE/SMEAR Tim Dorman MD 2023 1225   B : MEDIAL LEFT BREAST FLUID Fluid BREAST CYST FLUID LEFT STERILE FLUID CULTURE/SMEAR Tim Dorman MD 2023 1257        Staff:   Circulator: Sam Edwards RN  Scrub Person: Lea Bruce         Drains and/or Catheters:   Closed/Suction Drain Lateral;Left Breast Bulb 10 Fr. (Active)   Dressing Status Dry;Clean 23 1320   Drainage Appearance Bloody 23 1320   Status To bulb suction 23 1320       Closed/Suction Drain Inferior;Left;Medial Breast Bulb 10 Fr. (Active)   Dressing Status Clean;Dry 23 1320   Drainage Appearance Bloody 23 1320   Status To bulb suction 23 1320            Findings: This fluid periprosthetic space with Gram stain and culture  pending    Indications: Sabrina Michaud is an 58 y.o. female who is having surgery for abcess of left breast.With cellulitis. She has received over 24 hours of IV antibiotics and is going to go to the operating room today.    The patient was seen in the preoperative area. The risks, benefits, complications, treatment options, non-operative alternatives, expected recovery and outcomes were discussed with the patient. The possibilities of reaction to medication, pulmonary aspiration, injury to surrounding structures, bleeding, recurrent infection, the need for additional procedures, failure to diagnose a condition, and creating a complication requiring transfusion or operation were discussed with the patient. The patient concurred with the proposed plan, giving informed consent.  The site of surgery was properly noted/marked if necessary per policy. The patient has been actively warmed in preoperative area. Preoperative antibiotics have been ordered and given within 1 hours of incision. Venous thrombosis prophylaxis have been ordered including bilateral sequential compression devices    Procedure Details: The patient was identified in the holding area and her left breast marked.  Her  and she and I discussed the possibility that she would lose her implant reconstruction.  The likelihood of nipple areolar complex debridement was discussed and its consequences.  The unknow ability of her circumstance of having no implant and the review of the way back with implant reconstruction was detailed without specifics of timing.  A team huddle was performed in the PACU.  She was taken the operating room by Dr Tim Hicks and tracy Wolf and was observed.  In the supine position her calves were on 2 pillows and Flowtron's on and function prior to the induction of anesthesia.  Antibiotic was administered on-call to the OR.  She was induced with general anesthesia and LMA was placed.  She was prepped and draped in usual  fashion with Betasept.  Timeout was observed.  20 cc of 1% lidocaine with epinephrine mixed one-to-one with 0.5 Marcaine plain was infiltrated into the lateral limb of her left Wise pattern and in the area of the nipple areolar complex tissue necrosis perimeter.  A Tegaderm was applied to the nipple areolar complex and the T portion where there was skin erosion at this triple point.  The incision was created on the left limb of the Ricks pattern.  This was deepened until the bottom of the Goldilocks flap was identified.  This was dissected cephalically and laterally using lighted retractor, surgical assistant, headlight, 2.5 loupe magnification, and digital distraction.  The skin flap was elevated and tunneled in this fashion until the lateral limb of the Goldilocks inferior flap was identified, this was preserved.  The periprosthetic material of ADM was encountered at the chest wall.  This was cleared off 3 cm laterally and 4 cm anteriorly.  The ADM was divided with Bovie coagulation.  Serous fluid was cultured.  This was suctioned and the full periprosthetic space was explored.  The air was still some bogginess in the anterior limb of the Wise pattern where the most zone of erythema was present.  Once the space was irrigated with 3 L of saline and then Irrisept and then half-strength Betadine the space was closed with figure-of-eight 2-0 Vicryl.  Malleable brain retractor was utilized to protect the implant.  The tissue depth was closed over a drain with 10 Tajik placed in the periprosthetic space and brought out through a stab site in the lateral axilla.  The drain was secured with 3-0 nylon.  The dead space soft tissue was closed with figure-of-eight 2-0 Vicryl.  The dermis was closed with interrupted figure-of-eight 3-0 Monocryl.  The skin was closed with running subcuticular 3-0 Monocryl.  This area was excluded with a wet laparotomy pad.  Debridement of the triple point was undertaken with 10 blade knife.   This is closed with figure-of-eight 3-0 Monocryl and interrupted 3-0 nylon sutures.  The space of the nipple areolar complex was debrided sharply with a 10 blade knife until vascularized subcutaneous tissue was encountered.  Digital exploration found a specifically loculated seroma cavity which was separate and cultured for aerobic and anaerobic fluid.  This tube was irrigated with pulse lavage, Irrisept and Betadine.  Using a malleable brain retractor over the unincorporated ADM 10 Kinyarwanda drain was placed into this extra capsular space between the ADM and the mastectomy flap.  The syllable suction was secured to the skin with 3-0 nylon.  The ADM above and lateral to the nipple areolar complex was bile incorporated.  The space having been irrigated was then closed.  The nipple-areolar complex tissue created a circular defect and this was closed with buried 2-0 Vicryl figure-of-eight and then interrupted retention sutures of 3-0 nylon.  The distortion of this closure was acceptable given the circumstances.  He was cleansed with Betasept and blood and then had a 13 cm Prevena over the nipple areolar complex, triple point and the portion of the limb of the lateral Ricks pattern.  Sponge needle instrument counts were correct on 2 occasions.  Bio discs were applied to the 2 drain exit sites.  Blood loss was 25 cc.  Surgery bra was applied.  The patient was awakened extubated and transported in satisfactory condition to postanesthesia care unit.    Complications:  None; patient tolerated the procedure well.    Disposition: PACU - hemodynamically stable.  Condition: stable               Tim Dorman  Phone Number: [phone]

## 2023-11-01 NOTE — CONSULTS
Inpatient consult to Infectious Diseases  Consult performed by: Crystal Mckeon, APRN-CNP  Consult ordered by: Tim Dorman MD  Reason for consult: breast implant ronen prosthetic abscess        Referred by: Dr Dorman    Primary MD: Trell Pierre MD    Reason For Consult  Breast implant ronen prosthetic abscess    History Of Present Illness  Sabrina Michaud is a 58 y.o. female presenting with left breast infection.  The patient underwent bilateral mastectomy with breast reconstruction.  She states last week she noticed redness of her left breast.  She was seen by Dr Dorman in office and was sent to the ED for admission for surgery and IV antibiotics.  She was seen and evaluated in PACU s/p surgery.  She underwent debridement of left nipple areolar complex, drainage of abscess at 2 sites with left breast implant reconstruction.  Intraoperative cultures were sent.  She is on IV daptomycin and IV cefepime.  Denies any pain currently, reports recently receiving pain medication.  She is afebrile. Denies fevers prior to admission.  Denies SOB, chest pain, cough  Denies nausea, vomiting, diarrhea, abdominal pain.     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    Surgical History  She has a past surgical history that includes Hysterectomy (05/31/2016); Tonsillectomy (05/31/2016); Other surgical history (08/12/2021); and Mastectomy (Bilateral).     Social History     Occupational History    Not on file   Tobacco Use    Smoking status: Never    Smokeless tobacco: Never   Substance and Sexual Activity    Alcohol use: Yes     Alcohol/week: 2.0 - 3.0 standard drinks of alcohol     Types: 2 - 3 Glasses of wine per week    Drug use: Never    Sexual activity: Not  on file     Travel History   Travel since 10/01/23    No documented travel since 10/01/23            Family History  Family History   Problem Relation Name Age of Onset    Diabetes Mother      Cancer Other          Grandparent    Diabetes Other          Grandparent    Cancer Other          Aunt    Diabetes Sibling      Neuropathy Sibling       Allergies  Other, Piperacillin-tazobactam, and Vancomycin     Immunization History   Administered Date(s) Administered    Influenza, seasonal, injectable 02/11/2023    Pfizer Purple Cap SARS-CoV-2 04/30/2021, 05/31/2021, 12/01/2021    Pneumococcal, Unspecified 02/11/2023    Zoster vaccine, recombinant, adult (SHINGRIX) 01/01/2019, 07/24/2019     Medications  Home medications:  Medications Prior to Admission   Medication Sig Dispense Refill Last Dose    amLODIPine (Norvasc) 10 mg tablet Take 1 tablet (10 mg) by mouth once daily. 90 tablet 1     ascorbic acid (Vitamin C) 250 mg tablet Take 1 tablet (250 mg) by mouth 2 times a day.       atorvastatin (Lipitor) 20 mg tablet Take 1 tablet (20 mg) by mouth once daily. 90 tablet 1     cephalexin (Keflex) 500 mg capsule TAKE 1 CAPSULE BY MOUTH FOUR TIMES A DAY 40 capsule 0     enoxaparin (Lovenox) 40 mg/0.4 mL syringe INJECT SUBCUTANEOUSLY 40 MG EVERY MORNING AT 8AM 4 mL 0     ferrous gluconate 324 (38 Fe) MG tablet TAKE 1 TABLET IN THE MORNING AND 1 TABLET BEFORE BEDTIME 180 tablet 1     gabapentin (Neurontin) 400 mg capsule Take 1 capsule (400 mg) by mouth 3 times a day. 270 capsule 0     losartan-hydrochlorothiazide (Hyzaar) 50-12.5 mg tablet Take 1 tablet by mouth once daily. 90 tablet 1     metoprolol succinate XL (Toprol-XL) 50 mg 24 hr tablet Take 1 tablet (50 mg) by mouth once daily. Entered during abstraction- No further refills until seen in office 90 tablet 1     ondansetron ODT (Zofran-ODT) 4 mg disintegrating tablet DISSOLVE 1 TABLET ON TONGUE EVERY 6 HOURS AS NEEDED FOR NAUSEA OR VOMITING 20 tablet 1     Ozempic 0.25 mg  or 0.5 mg (2 mg/3 mL) pen injector INJECT 0.25 MG UNDER THE SKIN ONCE WEEKLY FOR 1 MONTH, THEN INCREASE TO 0.5MG WEEKLY 3 mL 0     potassium chloride CR (K-Tab) 20 mEq ER tablet Take 1 tablet (20 mEq) by mouth 2 times a day. 180 tablet 1     sertraline (Zoloft) 100 mg tablet Take 1 tablet (100 mg) by mouth 2 times a day. 180 tablet 1     SITagliptin phos-metformin (Janumet)  mg tablet Take 1 tablet by mouth 2 times a day with meals. 180 tablet 1     traMADol (Ultram) 50 mg tablet TAKE 1 TABLET BY MOUTH EVERY 6 HOURS AS NEEDED FOR PAIN 20 tablet 0      Current medications:  Scheduled medications  acetaminophen, 650 mg, oral, Once  albuterol, 2.5 mg, nebulization, Once  cefepime, 2 g, intravenous, q12h  cefepime, 2 g, intravenous, Once  celecoxib, 400 mg, oral, Once  daptomycin, 500 mg, intravenous, q24h JM  gabapentin, 600 mg, oral, Once  lidocaine PF, 0.1 mL, subcutaneous, Once      Continuous medications  dextrose 5 % and lactated Ringer's, 100 mL/hr, Last Rate: 100 mL/hr (11/01/23 1222)  lactated Ringer's, 100 mL/hr, Last Rate: 100 mL/hr (11/01/23 1345)      PRN medications  PRN medications: fentaNYL PF, HYDROmorphone, meperidine, midazolam, ondansetron, oxyCODONE-acetaminophen, promethazine (Phenergan) 6.25 mg in sodium chloride 0.9% 50 mL IV    Review of Systems   Constitutional:  Negative for chills, diaphoresis, fatigue and fever.   Respiratory:  Negative for cough and shortness of breath.    Cardiovascular:  Negative for chest pain.   Gastrointestinal:  Negative for abdominal pain, diarrhea, nausea and vomiting.   Skin:         Left breast postoperative dressing- left breast erythema prior to surgery   All other systems reviewed and are negative.       Objective  Range of Vitals (last 24 hours)  Heart Rate:  [70-97]   Temp:  [36.3 °C (97.3 °F)-37.2 °C (99 °F)]   Resp:  [10-17]   BP: (117-155)/()   SpO2:  [94 %-100 %]   Daily Weight  10/31/23 : 87.4 kg (192 lb 10.9 oz)    Body mass index is 33.07  kg/m².     Physical Exam  Constitutional:       Appearance: Normal appearance.   HENT:      Head: Normocephalic and atraumatic.      Nose: Nose normal.      Mouth/Throat:      Mouth: Mucous membranes are moist.      Pharynx: Oropharynx is clear.   Eyes:      Extraocular Movements: Extraocular movements intact.      Conjunctiva/sclera: Conjunctivae normal.   Cardiovascular:      Rate and Rhythm: Normal rate and regular rhythm.      Heart sounds: Normal heart sounds.   Pulmonary:      Effort: Pulmonary effort is normal.      Breath sounds: Normal breath sounds.   Abdominal:      General: Bowel sounds are normal.      Palpations: Abdomen is soft.   Musculoskeletal:         General: Normal range of motion.      Cervical back: Normal range of motion and neck supple.   Skin:     General: Skin is warm and dry.      Comments: Left breast dressing   Neurological:      General: No focal deficit present.      Mental Status: She is alert and oriented to person, place, and time.   Psychiatric:         Mood and Affect: Mood normal.         Behavior: Behavior normal.            Relevant Results    Labs  Results from last 72 hours   Lab Units 10/31/23  1210   WBC AUTO x10*3/uL 5.1   HEMOGLOBIN g/dL 11.1*   HEMATOCRIT % 34.8*   PLATELETS AUTO x10*3/uL 87*   NEUTROS PCT AUTO % 66.0   LYMPHS PCT AUTO % 20.6   MONOS PCT AUTO % 10.3   EOS PCT AUTO % 2.3     Results from last 72 hours   Lab Units 10/31/23  1210   SODIUM mmol/L 138   POTASSIUM mmol/L 3.8   CHLORIDE mmol/L 102   CO2 mmol/L 23*   BUN mg/dL 12   CREATININE mg/dL 0.50   GLUCOSE mg/dL 113*   CALCIUM mg/dL 9.4   ANION GAP mmol/L 13   EGFR mL/min/1.73m*2 >90     Results from last 72 hours   Lab Units 10/31/23  1210   ALK PHOS U/L 181*   BILIRUBIN TOTAL mg/dL 1.4*   PROTEIN TOTAL g/dL 7.9   ALT U/L 21   AST U/L 33   ALBUMIN g/dL 4.1     Estimated Creatinine Clearance: 125 mL/min (by C-G formula based on SCr of 0.5 mg/dL).  CRP   Date Value Ref Range Status   06/01/2022 1.02 (A)  "mg/dL Final     Comment:     REF VALUE  < 1.00     05/31/2022 0.77 mg/dL Final     Comment:     REF VALUE  < 1.00     05/20/2021 1.21 (A) mg/dL Final     Comment:     REF VALUE  < 1.00       Sedimentation Rate   Date Value Ref Range Status   06/01/2022 11 0 - 30 mm/h Final     Comment:     Please note new reference ranges as of 5/9/2022.   05/31/2022 18 0 - 30 mm/h Final     Comment:     Please note new reference ranges as of 5/9/2022.   05/20/2021 54 (H) 0 - 30 mm/h Final     Comment:      Note new reference range (males age 17-50) and new    methodology as of 03/04/2021.       No results found for: \"HIV1X2\", \"HIVCONF\", \"JFDYXP6GL\"  Hepatitis C Ab   Date Value Ref Range Status   05/22/2021 NONREACTIVE NONREACTIVE Final     Comment:      Results from patients taking biotin supplements or receiving   high-dose biotin therapy should be interpreted with caution   due to possible interference with this test. Providers may    contact their local laboratory for further information.       HCV PCR Quant   Date Value Ref Range Status   05/24/2021 NOT DETECTED IU/mL Final     Comment:     REF VALUE  NEGATIVE       Microbiology  Blood cultures pending with no growth  Urine culture pending  Tissue/biopsy from surgical site infection sent 10/31 pending  Intraoperative cultures pending from 11/1  Imaging  ECG 12 lead    Result Date: 10/31/2023  Normal sinus rhythm Normal ECG When compared with ECG of 17-AUG-2022 08:45, Previous ECG has undetermined rhythm, needs review QT has shortened Confirmed by Herson Quiroga ([id number]) on 10/31/2023 4:04:56 PM    BI US breast limited left    Result Date: 10/31/2023  Interpreted By:  Lucien Martínez, STUDY: BI US BREAST LIMITED LEFT; 10/31/2023 1:58 pm   INDICATION: Signs/Symptoms:possible infection. Rule out abscess.   COMPARISON: Mammograms from May and June 2023   ACCESSION NUMBER(S): [id number]   ORDERING CLINICIAN: NEDRA DING   TECHNIQUE: Limited real-time grayscale and color Doppler " imaging of the  breast was performed.   FINDINGS: Patient has previous mastectomy and reconstruction surgery. Elongated fluid collection is seen anteriorly to the left breast implant, measuring 6.4 by 0.5 by 3.7 cm. On the Doppler images there is no associated surrounding hyperemia.       Elongated fluid collection adjacent to the left breast implant, without associated surrounding hyperemia, most likely postsurgical seroma. No definite drainable abscess visualized.   ACR BI-RADS 2:  Benign finding.   Correlate clinically and follow-up as needed.   MACRO: None.   Signed by: Lucien Martínez 10/31/2023 2:16 PM Dictation workstation:   [id number]     Assessment/Plan   Left breast abscess-s/p  debridement of left nipple areolar complex, drainage of abscess at 2 sites with left breast implant reconstruction    Continue IV daptomycin  Continue IV cefepime  Follow-up pending cultures  Follow-up blood cultures  PICC line placement  Monitor WBC and temperature  Discharge plan is IV daptomycin and IV cefepime for 2 weeks, see discharge rec.    Discussed with Dr. Steven Mckeon, APRN-CNP

## 2023-11-01 NOTE — ANESTHESIA PREPROCEDURE EVALUATION
Patient: Sabrina Michaud    Procedure Information       Anesthesia Start Date/Time: 11/01/23 1204    Procedure: Incision and Drainage left breast abcess, Possible explant of breast prosthesis (Left: Breast)    Location: [facility] / [facility]    Surgeons: Tim Dorman MD            Relevant Problems   Cardiovascular   (+) Atrial tachycardia   (+) Benign essential hypertension      Endocrine   (+) Class 1 obesity with body mass index (BMI) of 31.0 to 31.9 in adult   (+) Diabetes mellitus type 2 in obese (CMS/HCC)      /Renal   (+) Hepatic cirrhosis (CMS/HCC)      Neuro/Psych   (+) Anxiety   (+) Depression      GI/Hepatic   (+) Hepatic cirrhosis (CMS/HCC)      Hematology   (+) Anemia      Infectious Disease   (+) Acute osteomyelitis of foot (CMS/HCC)   (+) Other acute osteomyelitis, unspecified ankle and foot (CMS/HCC)      Other   (+) Acute osteomyelitis of foot (CMS/HCC)   (+) Other acute osteomyelitis, unspecified ankle and foot (CMS/HCC)       Clinical information reviewed:   Tobacco  Allergies  Meds  Problems  Med Hx  Surg Hx   Fam Hx  Soc   Hx        NPO Detail:  No data recorded     Physical Exam    Airway  Mallampati: II  TM distance: >3 FB     Cardiovascular    Dental - normal exam     Pulmonary    Abdominal            Anesthesia Plan    ASA 2     general     intravenous induction   Anesthetic plan and risks discussed with patient.

## 2023-11-01 NOTE — PROGRESS NOTES
PCN spoke with ID SCOTTIE Rojas and orders placed for IV antibiotics at discharge for home. PCN sent Haiku message to  infusion intake Rachael Baldwin asking to have insurance verified and cost out on the medications. ADOD 1-2 days. Awaiting response, definitve discharge date      Sebastian Foote

## 2023-11-01 NOTE — CARE PLAN
The patient's goals for the shift include      The clinical goals for the shift include IV antibiotics, pain management, rest      Problem: Pain - Adult  Goal: Verbalizes/displays adequate comfort level or baseline comfort level  Outcome: Progressing

## 2023-11-01 NOTE — ANESTHESIA POSTPROCEDURE EVALUATION
2Patient: Sabrina Michaud    Procedure Summary       Date: 11/01/23 Room / Location: [facility] OR 03 / [facility] OR    Anesthesia Start: 1204 Anesthesia Stop:     Procedure: Incision and Drainage left breast abcess (Left: Breast) Diagnosis:       Abscess of breast      (abcess of left breast)    Surgeons: Tim Dorman MD Responsible Provider: Tim Hicks DO    Anesthesia Type: general ASA Status: 2            Anesthesia Type: No value filed.    Vitals Value Taken Time   BP  11/01/23 1321   Temp  11/01/23 1321   Pulse  11/01/23 1321   Resp  11/01/23 1321   SpO2  11/01/23 1321       Anesthesia Post Evaluation    Patient location during evaluation: bedside  Patient participation: complete - patient participated  Level of consciousness: awake  Pain management: adequate  Cardiovascular status: acceptable  Respiratory status: acceptable  Hydration status: acceptable        No notable events documented.

## 2023-11-02 ENCOUNTER — APPOINTMENT (OUTPATIENT)
Dept: WOUND CARE | Facility: HOSPITAL | Age: 58
End: 2023-11-02
Payer: COMMERCIAL

## 2023-11-02 ENCOUNTER — HOME HEALTH ADMISSION (OUTPATIENT)
Dept: HOME HEALTH SERVICES | Facility: HOME HEALTH | Age: 58
End: 2023-11-02
Payer: COMMERCIAL

## 2023-11-02 PROBLEM — N64.89 SEROMA OF BREAST: Status: ACTIVE | Noted: 2023-11-02

## 2023-11-02 LAB
BACTERIA UR CULT: NORMAL
GLUCOSE BLD MANUAL STRIP-MCNC: 109 MG/DL (ref 74–99)
GLUCOSE BLD MANUAL STRIP-MCNC: 111 MG/DL (ref 74–99)
GLUCOSE BLD MANUAL STRIP-MCNC: 114 MG/DL (ref 74–99)
GLUCOSE BLD MANUAL STRIP-MCNC: 120 MG/DL (ref 74–99)

## 2023-11-02 PROCEDURE — 2500000004 HC RX 250 GENERAL PHARMACY W/ HCPCS (ALT 636 FOR OP/ED): Performed by: SPECIALIST

## 2023-11-02 PROCEDURE — 1100000001 HC PRIVATE ROOM DAILY

## 2023-11-02 PROCEDURE — 82947 ASSAY GLUCOSE BLOOD QUANT: CPT

## 2023-11-02 PROCEDURE — C1751 CATH, INF, PER/CENT/MIDLINE: HCPCS

## 2023-11-02 PROCEDURE — 2500000001 HC RX 250 WO HCPCS SELF ADMINISTERED DRUGS (ALT 637 FOR MEDICARE OP): Performed by: SPECIALIST

## 2023-11-02 PROCEDURE — 2780000003 HC OR 278 NO HCPCS

## 2023-11-02 PROCEDURE — 36569 INSJ PICC 5 YR+ W/O IMAGING: CPT

## 2023-11-02 PROCEDURE — 94762 N-INVAS EAR/PLS OXIMTRY CONT: CPT

## 2023-11-02 RX ORDER — ACETAMINOPHEN 325 MG/1
975 TABLET ORAL 3 TIMES DAILY
Status: DISCONTINUED | OUTPATIENT
Start: 2023-11-02 | End: 2023-11-03 | Stop reason: HOSPADM

## 2023-11-02 RX ORDER — OXYCODONE AND ACETAMINOPHEN 5; 325 MG/1; MG/1
1 TABLET ORAL EVERY 6 HOURS PRN
Status: DISCONTINUED | OUTPATIENT
Start: 2023-11-02 | End: 2023-11-02

## 2023-11-02 RX ORDER — DAPTOMYCIN IN SODIUM CHLORIDE 500 MG/50ML
500 INJECTION, SOLUTION INTRAVENOUS DAILY
Qty: 650 ML | Refills: 0 | Status: SHIPPED | OUTPATIENT
Start: 2023-11-02 | End: 2023-11-15

## 2023-11-02 RX ORDER — IBUPROFEN 600 MG/1
600 TABLET ORAL 4 TIMES DAILY
Status: DISCONTINUED | OUTPATIENT
Start: 2023-11-02 | End: 2023-11-03 | Stop reason: HOSPADM

## 2023-11-02 RX ORDER — CEFEPIME 1 G/50ML
2 INJECTION, SOLUTION INTRAVENOUS EVERY 12 HOURS
Qty: 2600 ML | Refills: 0 | Status: SHIPPED | OUTPATIENT
Start: 2023-11-02 | End: 2023-11-15

## 2023-11-02 RX ORDER — OXYCODONE HYDROCHLORIDE 5 MG/1
5 TABLET ORAL EVERY 4 HOURS PRN
Status: DISCONTINUED | OUTPATIENT
Start: 2023-11-02 | End: 2023-11-03 | Stop reason: HOSPADM

## 2023-11-02 RX ADMIN — IBUPROFEN 600 MG: 600 TABLET ORAL at 15:02

## 2023-11-02 RX ADMIN — HYDROMORPHONE HYDROCHLORIDE 0.3 MG: 1 INJECTION, SOLUTION INTRAMUSCULAR; INTRAVENOUS; SUBCUTANEOUS at 12:24

## 2023-11-02 RX ADMIN — CEFEPIME 2 G: 2 INJECTION, POWDER, FOR SOLUTION INTRAVENOUS at 21:24

## 2023-11-02 RX ADMIN — HYDROMORPHONE HYDROCHLORIDE 0.3 MG: 1 INJECTION, SOLUTION INTRAMUSCULAR; INTRAVENOUS; SUBCUTANEOUS at 04:44

## 2023-11-02 RX ADMIN — OXYCODONE HYDROCHLORIDE 5 MG: 5 TABLET ORAL at 22:52

## 2023-11-02 RX ADMIN — OXYCODONE HYDROCHLORIDE 5 MG: 5 TABLET ORAL at 18:27

## 2023-11-02 RX ADMIN — OXYCODONE HYDROCHLORIDE 5 MG: 5 TABLET ORAL at 15:03

## 2023-11-02 RX ADMIN — HYDROMORPHONE HYDROCHLORIDE 0.3 MG: 1 INJECTION, SOLUTION INTRAMUSCULAR; INTRAVENOUS; SUBCUTANEOUS at 07:47

## 2023-11-02 RX ADMIN — IBUPROFEN 600 MG: 600 TABLET ORAL at 21:23

## 2023-11-02 RX ADMIN — SODIUM CHLORIDE, SODIUM LACTATE, POTASSIUM CHLORIDE, CALCIUM CHLORIDE, AND DEXTROSE MONOHYDRATE 100 ML/HR: 600; 310; 30; 20; 5 INJECTION, SOLUTION INTRAVENOUS at 04:16

## 2023-11-02 RX ADMIN — OXYCODONE AND ACETAMINOPHEN 1 TABLET: 5; 325 TABLET ORAL at 03:58

## 2023-11-02 RX ADMIN — HYDROMORPHONE HYDROCHLORIDE 0.3 MG: 1 INJECTION, SOLUTION INTRAMUSCULAR; INTRAVENOUS; SUBCUTANEOUS at 01:25

## 2023-11-02 RX ADMIN — CEFEPIME 2 G: 2 INJECTION, POWDER, FOR SOLUTION INTRAVENOUS at 09:32

## 2023-11-02 RX ADMIN — OXYCODONE AND ACETAMINOPHEN 1 TABLET: 5; 325 TABLET ORAL at 09:18

## 2023-11-02 RX ADMIN — DAPTOMYCIN IN SODIUM CHLORIDE 500 MG: 500 INJECTION, SOLUTION INTRAVENOUS at 10:11

## 2023-11-02 RX ADMIN — ACETAMINOPHEN 975 MG: 325 TABLET ORAL at 21:23

## 2023-11-02 RX ADMIN — ACETAMINOPHEN 975 MG: 325 TABLET ORAL at 15:02

## 2023-11-02 ASSESSMENT — PAIN - FUNCTIONAL ASSESSMENT
PAIN_FUNCTIONAL_ASSESSMENT: 0-10
PAIN_FUNCTIONAL_ASSESSMENT: 0-10
PAIN_FUNCTIONAL_ASSESSMENT: FLACC (FACE, LEGS, ACTIVITY, CRY, CONSOLABILITY)
PAIN_FUNCTIONAL_ASSESSMENT: 0-10

## 2023-11-02 ASSESSMENT — COGNITIVE AND FUNCTIONAL STATUS - GENERAL
DAILY ACTIVITIY SCORE: 24
MOBILITY SCORE: 24
MOBILITY SCORE: 24
DAILY ACTIVITIY SCORE: 24

## 2023-11-02 ASSESSMENT — PAIN SCALES - GENERAL
PAINLEVEL_OUTOF10: 10 - WORST POSSIBLE PAIN
PAINLEVEL_OUTOF10: 5 - MODERATE PAIN
PAINLEVEL_OUTOF10: 4
PAINLEVEL_OUTOF10: 2
PAINLEVEL_OUTOF10: 10 - WORST POSSIBLE PAIN
PAINLEVEL_OUTOF10: 5 - MODERATE PAIN
PAINLEVEL_OUTOF10: 3
PAINLEVEL_OUTOF10: 6
PAINLEVEL_OUTOF10: 4
PAINLEVEL_OUTOF10: 10 - WORST POSSIBLE PAIN
PAINLEVEL_OUTOF10: 6
PAINLEVEL_OUTOF10: 5 - MODERATE PAIN
PAINLEVEL_OUTOF10: 4
PAINLEVEL_OUTOF10: 4
PAINLEVEL_OUTOF10: 8
PAINLEVEL_OUTOF10: 6
PAINLEVEL_OUTOF10: 5 - MODERATE PAIN
PAINLEVEL_OUTOF10: 7
PAINLEVEL_OUTOF10: 5 - MODERATE PAIN
PAINLEVEL_OUTOF10: 0 - NO PAIN
PAINLEVEL_OUTOF10: 3

## 2023-11-02 ASSESSMENT — PAIN DESCRIPTION - DESCRIPTORS
DESCRIPTORS: ACHING

## 2023-11-02 NOTE — PROGRESS NOTES
Attempted to contact patient via telephone to assist with discharge planning.  No answer on room phone or patient cell phone, message left.  Also attempted to reach patient's spouse, no answer, message left.      Chart review indicates patient lives at home with her spouse, plan is for home health care for IV infusions on discharge.  FUAD Cortes assisting with arrangements through [facility].        Discharge plan NOT secure  DO NOT DC without speaking to care coordination     Will need INTERNAL home health care referral placed prior to discharge

## 2023-11-02 NOTE — PROGRESS NOTES
"Sabrina Michaud is a 58 y.o. female on day 0 of admission presenting with Cellulitis of left breast.    Subjective   The patient and I discussed her evening.  She had difficulty with pain management.  She has a new adjustment of her nonsteroidal anti-inflammatories of Tylenol and ibuprofen which will be administered around-the-clock.  She is tolerating a regular diet.  Her JIL drains were reviewed with her and her  for stripping them every 4 hours and they already know how to do that.  I reviewed her operative findings with regard to resecting a necrotic nipple areolar complex.  This can be reconstituted some months from now if she wishes to pursue this.  I discussed with her the fact that she should revisit her podiatrist as she is now on IV antibiotics and this makes improve her healing of her toe.  She will most likely have to suspend her hyperbaric oxygen as she is got a [employer] electric device that she will not be able to go into the chamber for that until I remove it next Wednesday.  I anticipate she will be ready to go home tomorrow.  She is going to have a alteration in her narcotic administration routine.  She feels well she is walking but feels a little dizzy.  Her  can help her with walking with the nurse etc.  I will revisit her tomorrow and most likely discharge her in the late morning.  She will follow-up next Wednesday or anytime she wants.         Objective     Physical Exam  Vitals and nursing note reviewed. Exam conducted with a chaperone present.   Constitutional:       Appearance: Normal appearance. She is normal weight.   Musculoskeletal:      Cervical back: Normal range of motion.   Skin:     General: Skin is warm.   Neurological:      Mental Status: She is alert.         Last Recorded Vitals  Blood pressure 142/68, pulse 90, temperature 36.6 °C (97.9 °F), temperature source Temporal, resp. rate 16, height 1.626 m (5' 4\"), weight 87.4 kg (192 lb 10.9 oz), SpO2 97 %.  Intake/Output " last 3 Shifts:  I/O last 3 completed shifts:  In: 5298.3 (60.6 mL/kg) [P.O.:600; I.V.:3598.3 (41.2 mL/kg); IV Piggyback:1100]  Out: 1935 (22.1 mL/kg) [Urine:1900 (0.6 mL/kg/hr); Drains:35]  Weight: 87.4 kg     Relevant Results            Scheduled medications  acetaminophen, 975 mg, oral, TID  cefepime, 2 g, intravenous, q12h  celecoxib, 400 mg, oral, Once  daptomycin, 500 mg, intravenous, q24h MJ  gabapentin, 600 mg, oral, Once  ibuprofen, 600 mg, oral, 4x daily  lidocaine PF, 5 mL, infiltration, Once        Results for orders placed or performed during the hospital encounter of 10/31/23 (from the past 24 hour(s))   POCT GLUCOSE   Result Value Ref Range    POCT Glucose 117 (H) 74 - 99 mg/dL   POCT GLUCOSE   Result Value Ref Range    POCT Glucose 120 (H) 74 - 99 mg/dL   POCT GLUCOSE   Result Value Ref Range    POCT Glucose 111 (H) 74 - 99 mg/dL      Continuous medications  dextrose 5 % and lactated Ringer's, 100 mL/hr, Last Rate: 100 mL/hr (11/02/23 1058)      PRN medications  PRN medications: alteplase, HYDROmorphone, oxyCODONE                  Assessment/Plan   Principal Problem:    Cellulitis of left breast           I spent 23   minutes in the professional and overall care of this patient.      Tim Dorman MD

## 2023-11-02 NOTE — PROGRESS NOTES
PCN called and spoke with  infusion pharmacy about the antibiotics. Per pharmacy, they only could see the Daptomycin. PCN also informed that they cannot process due to they were placed in orders as inpatient. PCN called and informed SCOTTIE Rojas with ID. Awaiting updates.       Sebastian Foote

## 2023-11-02 NOTE — CARE PLAN
Problem: Pain - Adult  Goal: Verbalizes/displays adequate comfort level or baseline comfort level  Outcome: Progressing  Flowsheets (Taken 11/1/2023 0421)  Verbalizes/displays adequate comfort level or baseline comfort level:   Encourage patient to monitor pain and request assistance   Administer analgesics based on type and severity of pain and evaluate response   Assess pain using appropriate pain scale   Implement non-pharmacological measures as appropriate and evaluate response   Notify Licensed Independent Practitioner if interventions unsuccessful or patient reports new pain     Problem: Safety - Adult  Goal: Free from fall injury  Outcome: Progressing  Flowsheets (Taken 11/1/2023 0421)  Free from fall injury: Instruct family/caregiver on patient safety     Problem: Discharge Planning  Goal: Discharge to home or other facility with appropriate resources  Outcome: Progressing  Flowsheets (Taken 11/1/2023 0421)  Discharge to home or other facility with appropriate resources:   Identify barriers to discharge with patient and caregiver   Identify discharge learning needs (meds, wound care, etc)     Problem: Chronic Conditions and Co-morbidities  Goal: Patient's chronic conditions and co-morbidity symptoms are monitored and maintained or improved  Outcome: Progressing  Flowsheets (Taken 11/1/2023 0421)  Care Plan - Patient's Chronic Conditions and Co-Morbidity Symptoms are Monitored and Maintained or Improved:   Monitor and assess patient's chronic conditions and comorbid symptoms for stability, deterioration, or improvement   Update acute care plan with appropriate goals if chronic or comorbid symptoms are exacerbated and prevent overall improvement and discharge     Problem: Pain  Goal: Takes deep breaths with improved pain control throughout the shift  Outcome: Progressing  Goal: Turns in bed with improved pain control throughout the shift  Outcome: Progressing  Goal: Walks with improved pain control  throughout the shift  Outcome: Progressing  Goal: Performs ADL's with improved pain control throughout shift  Outcome: Progressing  Goal: Free from opioid side effects throughout the shift  Outcome: Progressing  Goal: Free from acute confusion related to pain meds throughout the shift  Outcome: Progressing   The patient's goals for the shift include pain management, rest     The clinical goals for the shift include pain management, monitor drains      11/02/23 at 4:34 AM - Mandi Jarrell RN

## 2023-11-02 NOTE — PROGRESS NOTES
"PCN called and spoke with SCOTTIE Dominguez with ID team and informed Angelica that the antibiotic orders were not placed correctly for the  infusion pharmacy and need to be reentered as \"ambulatory\" and not inpatient for the pharmacy to pull the orders. Awaiting update from CNP at this time.      Sebastian Foote      "

## 2023-11-02 NOTE — PROGRESS NOTES
"PCN received Haiku message from  infusion pharmacy informing PCN that patient has \"NO COST\" for antibiotics for home. PCN informed patient as well as Dr. Dorman who was in the room. PCN also received response from  [facility] and can perform SOC tomorrow 11/03 in the afternoon. PCN sent message stating PCN called and spoke with Karon at  Infusion Pharmacy and the second dose of Cefipime tomorrow 11/03 can be given as early as 5pm. PCN informed  intake N[facility] that SOC could be scheduled for 5pm. Awaiting final response.      Sebastian Foote      "

## 2023-11-02 NOTE — CARE PLAN
The patient's goals for the shift include      The clinical goals for the shift include Ambulate      Problem: Pain - Adult  Goal: Verbalizes/displays adequate comfort level or baseline comfort level  Outcome: Progressing     Problem: Pain  Goal: Takes deep breaths with improved pain control throughout the shift  Outcome: Progressing  Goal: Turns in bed with improved pain control throughout the shift  Outcome: Progressing  Goal: Walks with improved pain control throughout the shift  Outcome: Progressing  Goal: Performs ADL's with improved pain control throughout shift  Outcome: Progressing  Goal: Participates in PT with improved pain control throughout the shift  Outcome: Progressing  Goal: Free from opioid side effects throughout the shift  Outcome: Progressing  Goal: Free from acute confusion related to pain meds throughout the shift  Outcome: Progressing

## 2023-11-02 NOTE — PROGRESS NOTES
PCN called and spoke with  infusion pharmacy as SCOTTIE Dominguez was placing orders for the two IV antibiotics. Per pharmacy, they received the correct orders. Awaiting cost out.       Sebastian Foote

## 2023-11-02 NOTE — PROGRESS NOTES
PCN received Haiku response from [facility] team and they can perform SOC for home IV infusion tomorrow 11/03 at 1700 for second dose of Cefipime. Patient aware as well as bedside nurse. TCC aware. PCN to send message to Dr. Dorman.     Sebastian Foote

## 2023-11-02 NOTE — CONSULTS
Vascular Access Team  Consult     Visit Date: 11/2/2023      Patient Name: Sabrina Michaud         MRN: [id number]                Reason for Consult: Picc line placement             Assessment: Pt's chart reviewed for any contraindications for picc line, none noted            Plan: Plan for picc placement today       Tori Rowe RN  11/2/2023  12:14 PM

## 2023-11-03 ENCOUNTER — HOME CARE VISIT (OUTPATIENT)
Dept: HOME HEALTH SERVICES | Facility: HOME HEALTH | Age: 58
End: 2023-11-03
Payer: COMMERCIAL

## 2023-11-03 ENCOUNTER — APPOINTMENT (OUTPATIENT)
Dept: WOUND CARE | Facility: HOSPITAL | Age: 58
End: 2023-11-03
Payer: COMMERCIAL

## 2023-11-03 ENCOUNTER — PHARMACY VISIT (OUTPATIENT)
Dept: PHARMACY | Facility: CLINIC | Age: 58
End: 2023-11-03
Payer: COMMERCIAL

## 2023-11-03 VITALS
OXYGEN SATURATION: 97 % | RESPIRATION RATE: 16 BRPM | SYSTOLIC BLOOD PRESSURE: 144 MMHG | HEART RATE: 97 BPM | TEMPERATURE: 97.5 F | BODY MASS INDEX: 32.9 KG/M2 | HEIGHT: 64 IN | WEIGHT: 192.68 LBS | DIASTOLIC BLOOD PRESSURE: 71 MMHG

## 2023-11-03 LAB
GLUCOSE BLD MANUAL STRIP-MCNC: 118 MG/DL (ref 74–99)
GLUCOSE BLD MANUAL STRIP-MCNC: 127 MG/DL (ref 74–99)

## 2023-11-03 PROCEDURE — 2500000004 HC RX 250 GENERAL PHARMACY W/ HCPCS (ALT 636 FOR OP/ED): Performed by: SPECIALIST

## 2023-11-03 PROCEDURE — 82947 ASSAY GLUCOSE BLOOD QUANT: CPT

## 2023-11-03 PROCEDURE — G0299 HHS/HOSPICE OF RN EA 15 MIN: HCPCS

## 2023-11-03 PROCEDURE — RXMED WILLOW AMBULATORY MEDICATION CHARGE

## 2023-11-03 PROCEDURE — 2500000004 HC RX 250 GENERAL PHARMACY W/ HCPCS (ALT 636 FOR OP/ED): Mod: JZ | Performed by: SPECIALIST

## 2023-11-03 PROCEDURE — 2500000001 HC RX 250 WO HCPCS SELF ADMINISTERED DRUGS (ALT 637 FOR MEDICARE OP): Performed by: SPECIALIST

## 2023-11-03 PROCEDURE — 0023 HH SOC

## 2023-11-03 RX ORDER — ONDANSETRON 4 MG/1
8 TABLET, ORALLY DISINTEGRATING ORAL EVERY 8 HOURS PRN
Status: DISCONTINUED | OUTPATIENT
Start: 2023-11-03 | End: 2023-11-03 | Stop reason: HOSPADM

## 2023-11-03 RX ORDER — ENOXAPARIN SODIUM 100 MG/ML
INJECTION SUBCUTANEOUS
Qty: 10 ML | Refills: 0 | Status: SHIPPED | OUTPATIENT
Start: 2023-11-03 | End: 2023-11-03

## 2023-11-03 RX ORDER — ENOXAPARIN SODIUM 100 MG/ML
40 INJECTION SUBCUTANEOUS DAILY
Qty: 10 EACH | Refills: 0 | Status: SHIPPED | OUTPATIENT
Start: 2023-11-03 | End: 2023-11-25 | Stop reason: ENTERED-IN-ERROR

## 2023-11-03 RX ORDER — ONDANSETRON HYDROCHLORIDE 2 MG/ML
4 INJECTION, SOLUTION INTRAVENOUS EVERY 8 HOURS PRN
Status: DISCONTINUED | OUTPATIENT
Start: 2023-11-03 | End: 2023-11-03 | Stop reason: HOSPADM

## 2023-11-03 RX ORDER — ONDANSETRON HYDROCHLORIDE 8 MG/1
8 TABLET, FILM COATED ORAL EVERY 8 HOURS PRN
Qty: 20 TABLET | Refills: 0 | Status: SHIPPED | OUTPATIENT
Start: 2023-11-03 | End: 2024-04-24 | Stop reason: HOSPADM

## 2023-11-03 RX ORDER — OXYCODONE AND ACETAMINOPHEN 7.5; 325 MG/1; MG/1
1 TABLET ORAL EVERY 6 HOURS PRN
Qty: 30 TABLET | Refills: 0 | Status: SHIPPED | OUTPATIENT
Start: 2023-11-03 | End: 2023-11-03

## 2023-11-03 RX ORDER — OXYCODONE AND ACETAMINOPHEN 7.5; 325 MG/1; MG/1
1 TABLET ORAL EVERY 6 HOURS PRN
Qty: 30 TABLET | Refills: 0 | Status: SHIPPED | OUTPATIENT
Start: 2023-11-03 | End: 2023-11-25 | Stop reason: ENTERED-IN-ERROR

## 2023-11-03 RX ADMIN — ONDANSETRON 4 MG: 2 INJECTION INTRAMUSCULAR; INTRAVENOUS at 14:08

## 2023-11-03 RX ADMIN — OXYCODONE HYDROCHLORIDE 5 MG: 5 TABLET ORAL at 08:55

## 2023-11-03 RX ADMIN — ACETAMINOPHEN 975 MG: 325 TABLET ORAL at 08:54

## 2023-11-03 RX ADMIN — IBUPROFEN 600 MG: 600 TABLET ORAL at 14:09

## 2023-11-03 RX ADMIN — OXYCODONE HYDROCHLORIDE 5 MG: 5 TABLET ORAL at 05:09

## 2023-11-03 RX ADMIN — OXYCODONE HYDROCHLORIDE 5 MG: 5 TABLET ORAL at 14:09

## 2023-11-03 RX ADMIN — IBUPROFEN 600 MG: 600 TABLET ORAL at 05:09

## 2023-11-03 RX ADMIN — DAPTOMYCIN IN SODIUM CHLORIDE 500 MG: 500 INJECTION, SOLUTION INTRAVENOUS at 09:28

## 2023-11-03 RX ADMIN — CEFEPIME 2 G: 2 INJECTION, POWDER, FOR SOLUTION INTRAVENOUS at 08:54

## 2023-11-03 ASSESSMENT — PAIN SCALES - GENERAL
PAINLEVEL_OUTOF10: 3
PAINLEVEL_OUTOF10: 0 - NO PAIN
PAINLEVEL_OUTOF10: 1
PAINLEVEL_OUTOF10: 2
PAINLEVEL_OUTOF10: 3
PAINLEVEL_OUTOF10: 3
PAINLEVEL_OUTOF10: 6

## 2023-11-03 ASSESSMENT — COGNITIVE AND FUNCTIONAL STATUS - GENERAL
MOBILITY SCORE: 24
DRESSING REGULAR UPPER BODY CLOTHING: A LITTLE
DAILY ACTIVITIY SCORE: 23

## 2023-11-03 ASSESSMENT — PAIN - FUNCTIONAL ASSESSMENT
PAIN_FUNCTIONAL_ASSESSMENT: 0-10

## 2023-11-03 ASSESSMENT — PAIN DESCRIPTION - DESCRIPTORS: DESCRIPTORS: ACHING

## 2023-11-03 NOTE — DISCHARGE SUMMARY
Discharge Diagnosis  Cellulitis of left breast    Issues Requiring Follow-Up  Breast cellulitis will need surveillance examination.  JIL drainage will be managed with 5 times a day stripping and measurement of milliliters.  IV antibiotics will be managed by Dr. Ashley Cantu and team.    Test Results Pending At Discharge  Pending Labs       Order Current Status    Extra Urine Gray Tube Collected (10/31/23 1203)    Urinalysis with Reflex Microscopic and Culture In process    Blood Culture Preliminary result    Blood Culture Preliminary result    Sterile Fluid Culture/Smear Preliminary result    Sterile Fluid Culture/Smear Preliminary result    Tissue/Wound Culture/Smear Preliminary result            Hospital Course   The patient was admitted Wednesday and operated upon Thursday after a short course of IV antibiotics.  Her details per operative note.  Her erythema improved Thursday to Friday.  The patient was having enough pain to warrant staying in the hospital for narcotic pain management.  Friday 1/ 3 the patient had satisfactory pain management for discharge.  She understood and could complete her drain meant maintenance.  She will be discharged on her Eli VAC, IV antibiotics, PICC line on the right arm, and JIL drains.  Pertinent Physical Exam At Time of Discharge  Physical Exam    Home Medications     Medication List      START taking these medications     cefepime 2 g in dextrose 5 % 5 % 100 mL IV; Infuse 2 g at 200 mL/hr over   30 minutes into a venous catheter every 12 hours for 14 days.   cefepime IV; Commonly known as: Maxipime; Infuse 100 mL (2 g) at 200   mL/hr over 30 minutes into a venous catheter every 12 hours for 13 days.   * DAPTOmycin 500 mg/50 mL IV; Commonly known as: Cubicin; Infuse 50 mL   (500 mg) over 30 minutes into a venous catheter once every 24 hours for 13   days. Do not start before November 2, 2023.   * DAPTOmycin 500 mg/50 mL IV; Commonly known as: Cubicin; Infuse 50 mL   (500 mg)  over 30 minutes into a venous catheter once daily for 13 days.   sodium chloride 0.9% flush; Infuse 10 mL into a venous catheter once   daily.  * This list has 2 medication(s) that are the same as other medications   prescribed for you. Read the directions carefully, and ask your doctor or   other care provider to review them with you.     ASK your doctor about these medications     amLODIPine 10 mg tablet; Commonly known as: Norvasc; Take 1 tablet (10   mg) by mouth once daily.   ascorbic acid 250 mg tablet; Commonly known as: Vitamin C   atorvastatin 20 mg tablet; Commonly known as: Lipitor; Take 1 tablet (20   mg) by mouth once daily.   cephalexin 500 mg capsule; Commonly known as: Keflex; TAKE 1 CAPSULE BY   MOUTH FOUR TIMES A DAY   enoxaparin 40 mg/0.4 mL syringe; Commonly known as: Lovenox; INJECT   SUBCUTANEOUSLY 40 MG EVERY MORNING AT 8AM   ferrous gluconate 324 (38 Fe) mg tablet; Commonly known as: Fergon; TAKE   1 TABLET IN THE MORNING AND 1 TABLET BEFORE BEDTIME   gabapentin 400 mg capsule; Commonly known as: Neurontin; Take 1 capsule   (400 mg) by mouth 3 times a day.   Janumet  mg tablet; Generic drug: SITagliptin phos-metformin; Take   1 tablet by mouth 2 times a day with meals.   losartan-hydrochlorothiazide 50-12.5 mg tablet; Commonly known as:   Hyzaar; Take 1 tablet by mouth once daily.   metoprolol succinate XL 50 mg 24 hr tablet; Commonly known as:   Toprol-XL; Take 1 tablet (50 mg) by mouth once daily. Entered during   abstraction- No further refills until seen in office   ondansetron ODT 4 mg disintegrating tablet; Commonly known as:   Zofran-ODT; DISSOLVE 1 TABLET ON TONGUE EVERY 6 HOURS AS NEEDED FOR NAUSEA   OR VOMITING   Ozempic 0.25 mg or 0.5 mg (2 mg/3 mL) pen injector; Generic drug:   semaglutide; INJECT 0.25 MG UNDER THE SKIN ONCE WEEKLY FOR 1 MONTH, THEN   INCREASE TO 0.5MG WEEKLY   potassium chloride CR 20 mEq ER tablet; Commonly known as: Klor-Con M20;   Take 1 tablet (20 mEq)  by mouth 2 times a day.   sertraline 100 mg tablet; Commonly known as: Zoloft; Take 1 tablet (100   mg) by mouth 2 times a day.   traMADol 50 mg tablet; Commonly known as: Ultram; TAKE 1 TABLET BY MOUTH   EVERY 6 HOURS AS NEEDED FOR PAIN       Outpatient Follow-Up  Future Appointments   Date Time Provider Department Center   11/3/2023  4:00 PM Ciarra Nava RN [facility]   11/8/2023  3:00 PM GEN WOUND, WOUNDCARE RESOURCE [facility]       Tim Dorman MD

## 2023-11-03 NOTE — CARE PLAN
The patient's goals for the shift include      The clinical goals for the shift include pain control      Problem: Pain - Adult  Goal: Verbalizes/displays adequate comfort level or baseline comfort level  Outcome: Progressing

## 2023-11-03 NOTE — CARE PLAN
The patient's goals for the shift include  pain control    The clinical goals for the shift include Ambulate

## 2023-11-03 NOTE — PROGRESS NOTES
Sabrina Michaud is a 58 y.o. female on day 1 of admission presenting with Cellulitis of left breast.    Subjective   Interval History:   Pain controlled  Afebrile, no chills  Denies shortness of breath or chest pain  Denies nausea, vomiting, diarrhea        Review of Systems    Objective   Range of Vitals (last 24 hours)  Heart Rate:  [91-98]   Temp:  [36.4 °C (97.5 °F)-37.2 °C (99 °F)]   Resp:  [15-16]   BP: (134-150)/(58-74)   SpO2:  [97 %]   Daily Weight  10/31/23 : 87.4 kg (192 lb 10.9 oz)    Body mass index is 33.07 kg/m².    Physical Exam  Constitutional:       Appearance: Normal appearance.   HENT:      Head: Normocephalic and atraumatic.      Nose: Nose normal.      Mouth/Throat:      Mouth: Mucous membranes are moist.      Pharynx: Oropharynx is clear.   Eyes:      Extraocular Movements: Extraocular movements intact.      Conjunctiva/sclera: Conjunctivae normal.   Cardiovascular:      Rate and Rhythm: Normal rate and regular rhythm.      Heart sounds: Normal heart sounds.   Pulmonary:      Effort: Pulmonary effort is normal.      Breath sounds: Normal breath sounds.   Abdominal:      General: Bowel sounds are normal.      Palpations: Abdomen is soft.   Musculoskeletal:         General: Normal range of motion.      Cervical back: Normal range of motion and neck supple.   Skin:     General: Skin is warm and dry.      Comments: Left breast incision wound vac dressing intact  Neurological:      General: No focal deficit present.      Mental Status: She is alert and oriented to person, place, and time.   Psychiatric:         Mood and Affect: Mood normal.         Behavior: Behavior normal.     Antibiotics  vancomycin-diluent combo no.1 (Xellia) IVPB 1 g  cefuroxime (Zinacef) injection 750 mg  cefuroxime (Zinacef) 750 mg in dextrose 5 % in water (D5W) 50 mL IV  cefuroxime (Zinacef) 1.5 g in dextrose 5 % in water (D5W) 50 mL IV  DAPTOmycin (Cubicin)  mg  cefepime (Maxipime) 2 g in dextrose 5 % 100 mL  IV  oxyCODONE-acetaminophen (Percocet) 5-325 mg per tablet 1 tablet  dextrose 5 % and lactated Ringer's infusion  celecoxib (CeleBREX) capsule 400 mg  acetaminophen (Tylenol) tablet 650 mg  gabapentin (Neurontin) capsule 600 mg  ondansetron ODT (Zofran-ODT) disintegrating tablet 4 mg  dextrose 5 % and lactated Ringer's infusion  Study ERAS-UPROAR Gatorade/Powerade (carbohydrate sports drink) oral liquid  cefepime (Maxipime) 2 g in dextrose 5 % 100 mL IV  DAPTOmycin (Cubicin)  mg  cefepime (Maxipime) 2 g in dextrose 5 % 100 mL IV  acetaminophen (Tylenol) tablet 650 mg  DAPTOmycin (Cubicin)  mg  gabapentin (Neurontin) capsule 600 mg  bacitracin ointment  - Omnicell Override Pull  lidocaine-epinephrine (Xylocaine W/EPI) injection  - Omnicell Override Pull  bupivacaine PF (Marcaine) injection  - Omnicell Override Pull  lidocaine PF (Xylocaine) 10 mg/mL (1 %) injection 1 mg  lactated Ringer's infusion  HYDROmorphone PF (Dilaudid) injection 0.2 mg  fentaNYL PF (Sublimaze) injection 50 mcg  midazolam (Versed) injection 1 mg  ondansetron (Zofran) injection 4 mg  promethazine (Phenergan) 6.25 mg in sodium chloride 0.9% 50 mL IV  meperidine (PF) (Demerol) injection 12.5 mg  albuterol 2.5 mg /3 mL (0.083 %) nebulizer solution 2.5 mg  ceFAZolin in dextrose (iso-os) (Ancef) IVPB  - Omnicell Override Pull  lidocaine-epinephrine (Xylocaine W/EPI) 1 %-1:100,000 injection  bupivacaine PF (Marcaine) 0.5 % (5 mg/mL) injection  HYDROmorphone PF (Dilaudid) injection  - Omnicell Override Pull          lidocaine PF (Xylocaine) 10 mg/mL (1 %) injection 50 mg  alteplase (Cathflo Activase) injection 2 mg  sodium chloride 0.9% flush  HYDROmorphone (Dilaudid) injection 0.3 mg      oxyCODONE-acetaminophen (Percocet) 5-325 mg per tablet 1 tablet  ibuprofen tablet 600 mg  acetaminophen (Tylenol) tablet 975 mg  oxyCODONE (Roxicodone) immediate release tablet 5 mg      Relevant Results  Labs              CrCl cannot be calculated  (Patient's most recent lab result is older than the maximum 3 days allowed.).  CRP   Date Value Ref Range Status   06/01/2022 1.02 (A) mg/dL Final     Comment:     REF VALUE  < 1.00     05/31/2022 0.77 mg/dL Final     Comment:     REF VALUE  < 1.00     05/20/2021 1.21 (A) mg/dL Final     Comment:     REF VALUE  < 1.00       Microbiology  Susceptibility data from last 14 days.  Collected Specimen Info Organism Aztreonam Cefepime Ceftazidime Ciprofloxacin Gentamicin Levofloxacin Piperacillin/Tazobactam Tobramycin   10/31/23 Tissue/Biopsy from Surgical Site Infection Pseudomonas aeruginosa S S S S S S S S     Corynebacterium striatum group               Imaging  ECG 12 lead    Result Date: 10/31/2023  Normal sinus rhythm Normal ECG When compared with ECG of 17-AUG-2022 08:45, Previous ECG has undetermined rhythm, needs review QT has shortened Confirmed by Herson Quiroga ([id number]) on 10/31/2023 4:04:56 PM    BI US breast limited left    Result Date: 10/31/2023  Interpreted By:  Lucien Martínez, STUDY: BI US BREAST LIMITED LEFT; 10/31/2023 1:58 pm   INDICATION: Signs/Symptoms:possible infection. Rule out abscess.   COMPARISON: Mammograms from May and June 2023   ACCESSION NUMBER(S): [id number]   ORDERING CLINICIAN: NEDRA DING   TECHNIQUE: Limited real-time grayscale and color Doppler imaging of the  breast was performed.   FINDINGS: Patient has previous mastectomy and reconstruction surgery. Elongated fluid collection is seen anteriorly to the left breast implant, measuring 6.4 by 0.5 by 3.7 cm. On the Doppler images there is no associated surrounding hyperemia.       Elongated fluid collection adjacent to the left breast implant, without associated surrounding hyperemia, most likely postsurgical seroma. No definite drainable abscess visualized.   ACR BI-RADS 2:  Benign finding.   Correlate clinically and follow-up as needed.   MACRO: None.   Signed by: Lucien Martínez 10/31/2023 2:16 PM Dictation workstation:    [id number]        Assessment/Plan   Left breast abscess-s/p  debridement of left nipple areolar complex, drainage of abscess at 2 sites with left breast implant reconstruction     Continue IV daptomycin  Continue IV cefepime  Follow-up pending cultures  Follow-up blood cultures  PICC line placement  Monitor WBC and temperature  Discharge plan is IV daptomycin and IV cefepime for 2 weeks, see discharge rec.  Weekly CBC with diff, CMP    Angelica Philippe, APRN-CNP

## 2023-11-03 NOTE — PROGRESS NOTES
Dc plan is for pt to return home with [facility]. FUAD Cortes has arranged the [facility] and is secured SOC for today 11/3.     Safe dc plan secured home with home infusion.    Rachael GONZALEZ, LSW

## 2023-11-04 LAB
BACTERIA BLD CULT: NORMAL
BACTERIA BLD CULT: NORMAL
BACTERIA SPEC CULT: ABNORMAL
BACTERIA SPEC CULT: ABNORMAL
GRAM STN SPEC: ABNORMAL
GRAM STN SPEC: ABNORMAL

## 2023-11-05 LAB
BACTERIA FLD CULT: NORMAL
BACTERIA FLD CULT: NORMAL
GRAM STN SPEC: NORMAL

## 2023-11-06 ENCOUNTER — TELEPHONE (OUTPATIENT)
Dept: PRIMARY CARE | Facility: CLINIC | Age: 58
End: 2023-11-06
Payer: COMMERCIAL

## 2023-11-06 VITALS
RESPIRATION RATE: 20 BRPM | SYSTOLIC BLOOD PRESSURE: 120 MMHG | HEART RATE: 80 BPM | OXYGEN SATURATION: 98 % | TEMPERATURE: 98 F | DIASTOLIC BLOOD PRESSURE: 58 MMHG

## 2023-11-06 ASSESSMENT — ACTIVITIES OF DAILY LIVING (ADL)
CURRENT_FUNCTION: STAND BY ASSIST
ENTERING_EXITING_HOME: STAND BY ASSIST
TOILETING: STAND BY ASSIST
AMBULATION ASSISTANCE: 1
PHYSICAL TRANSFERS ASSESSED: 1
OASIS_M1830: 03
TOILETING: 1
AMBULATION ASSISTANCE: STAND BY ASSIST

## 2023-11-06 ASSESSMENT — ENCOUNTER SYMPTOMS
PAIN LOCATION - RELIEVING FACTORS: REST, PAIN MEDICATION
PAIN LOCATION - PAIN SEVERITY: 2/10
PAIN SEVERITY GOAL: 0/10
DEPRESSION: 0
PERSON REPORTING PAIN: PATIENT
PAIN: 1
PAIN LOCATION - PAIN FREQUENCY: FREQUENT
HIGHEST PAIN SEVERITY IN PAST 24 HOURS: 4/10
APPETITE LEVEL: GOOD
PAIN LOCATION: CHEST
LOSS OF SENSATION IN FEET: 0
SUBJECTIVE PAIN PROGRESSION: GRADUALLY IMPROVING
LOWEST PAIN SEVERITY IN PAST 24 HOURS: 2/10
CHANGE IN APPETITE: UNCHANGED
OCCASIONAL FEELINGS OF UNSTEADINESS: 0

## 2023-11-07 ENCOUNTER — LAB (OUTPATIENT)
Dept: LAB | Facility: LAB | Age: 58
End: 2023-11-07
Payer: COMMERCIAL

## 2023-11-07 ENCOUNTER — TELEPHONE (OUTPATIENT)
Dept: INPATIENT UNIT | Facility: HOSPITAL | Age: 58
End: 2023-11-07

## 2023-11-07 ENCOUNTER — HOME CARE VISIT (OUTPATIENT)
Dept: HOME HEALTH SERVICES | Facility: HOME HEALTH | Age: 58
End: 2023-11-07
Payer: COMMERCIAL

## 2023-11-07 DIAGNOSIS — N61.1 ABSCESS OF BREAST: ICD-10-CM

## 2023-11-07 DIAGNOSIS — N61.0 CELLULITIS OF LEFT BREAST: ICD-10-CM

## 2023-11-07 LAB
ALBUMIN SERPL BCP-MCNC: 3.7 G/DL (ref 3.4–5)
ALP SERPL-CCNC: 171 U/L (ref 33–110)
ALT SERPL W P-5'-P-CCNC: 20 U/L (ref 7–45)
ANION GAP SERPL CALC-SCNC: 14 MMOL/L (ref 10–20)
AST SERPL W P-5'-P-CCNC: 28 U/L (ref 9–39)
BASOPHILS # BLD AUTO: 0.02 X10*3/UL (ref 0–0.1)
BASOPHILS NFR BLD AUTO: 0.6 %
BILIRUB SERPL-MCNC: 0.8 MG/DL (ref 0–1.2)
BUN SERPL-MCNC: 8 MG/DL (ref 6–23)
CALCIUM SERPL-MCNC: 8.7 MG/DL (ref 8.6–10.3)
CHLORIDE SERPL-SCNC: 101 MMOL/L (ref 98–107)
CK SERPL-CCNC: 34 U/L (ref 0–215)
CO2 SERPL-SCNC: 26 MMOL/L (ref 21–32)
CREAT SERPL-MCNC: 0.42 MG/DL (ref 0.5–1.05)
EOSINOPHIL # BLD AUTO: 0.11 X10*3/UL (ref 0–0.7)
EOSINOPHIL NFR BLD AUTO: 3.2 %
ERYTHROCYTE [DISTWIDTH] IN BLOOD BY AUTOMATED COUNT: 13.2 % (ref 11.5–14.5)
GFR SERPL CREATININE-BSD FRML MDRD: >90 ML/MIN/1.73M*2
GLUCOSE SERPL-MCNC: 134 MG/DL (ref 74–99)
HCT VFR BLD AUTO: 32.8 % (ref 36–46)
HGB BLD-MCNC: 10.4 G/DL (ref 12–16)
IMM GRANULOCYTES # BLD AUTO: 0.02 X10*3/UL (ref 0–0.7)
IMM GRANULOCYTES NFR BLD AUTO: 0.6 % (ref 0–0.9)
LYMPHOCYTES # BLD AUTO: 0.9 X10*3/UL (ref 1.2–4.8)
LYMPHOCYTES NFR BLD AUTO: 26 %
MCH RBC QN AUTO: 32.3 PG (ref 26–34)
MCHC RBC AUTO-ENTMCNC: 31.7 G/DL (ref 32–36)
MCV RBC AUTO: 102 FL (ref 80–100)
MONOCYTES # BLD AUTO: 0.37 X10*3/UL (ref 0.1–1)
MONOCYTES NFR BLD AUTO: 10.7 %
NEUTROPHILS # BLD AUTO: 2.04 X10*3/UL (ref 1.2–7.7)
NEUTROPHILS NFR BLD AUTO: 58.9 %
NRBC BLD-RTO: 0 /100 WBCS (ref 0–0)
PLATELET # BLD AUTO: 104 X10*3/UL (ref 150–450)
POTASSIUM SERPL-SCNC: 3.3 MMOL/L (ref 3.5–5.3)
PROT SERPL-MCNC: 7.1 G/DL (ref 6.4–8.2)
RBC # BLD AUTO: 3.22 X10*6/UL (ref 4–5.2)
SODIUM SERPL-SCNC: 138 MMOL/L (ref 136–145)
WBC # BLD AUTO: 3.5 X10*3/UL (ref 4.4–11.3)

## 2023-11-07 PROCEDURE — G0299 HHS/HOSPICE OF RN EA 15 MIN: HCPCS

## 2023-11-08 ENCOUNTER — OFFICE VISIT (OUTPATIENT)
Dept: WOUND CARE | Facility: HOSPITAL | Age: 58
End: 2023-11-08
Payer: COMMERCIAL

## 2023-11-08 ENCOUNTER — CLINICAL SUPPORT (OUTPATIENT)
Dept: WOUND CARE | Facility: HOSPITAL | Age: 58
End: 2023-11-08
Payer: COMMERCIAL

## 2023-11-08 LAB
GLUCOSE BLD MANUAL STRIP-MCNC: 114 MG/DL (ref 74–99)
GLUCOSE BLD MANUAL STRIP-MCNC: 114 MG/DL (ref 74–99)
GLUCOSE BLD MANUAL STRIP-MCNC: 131 MG/DL (ref 74–99)

## 2023-11-08 PROCEDURE — G0277 HBOT, FULL BODY CHAMBER, 30M: HCPCS

## 2023-11-08 PROCEDURE — 82947 ASSAY GLUCOSE BLOOD QUANT: CPT

## 2023-11-08 PROCEDURE — 11042 DBRDMT SUBQ TIS 1ST 20SQCM/<: CPT

## 2023-11-08 PROCEDURE — 82962 GLUCOSE BLOOD TEST: CPT

## 2023-11-08 ASSESSMENT — ENCOUNTER SYMPTOMS
PAIN SEVERITY GOAL: 0/10
LOWEST PAIN SEVERITY IN PAST 24 HOURS: 2/10
HIGHEST PAIN SEVERITY IN PAST 24 HOURS: 2/10
PAIN LOCATION - PAIN DURATION: NOT LONG
PAIN LOCATION - PAIN SEVERITY: 2/10
CHANGE IN APPETITE: UNCHANGED
PAIN LOCATION - PAIN FREQUENCY: INTERMITTENT
PAIN LOCATION - RELIEVING FACTORS: REST
PAIN: 1
APPETITE LEVEL: GOOD
PAIN LOCATION: LEFT BREAST
PERSON REPORTING PAIN: PATIENT
SUBJECTIVE PAIN PROGRESSION: GRADUALLY IMPROVING

## 2023-11-08 NOTE — HOME HEALTH
PICC line dressing changed per protocol and using sterile technique. Cap and extension tubing changed as well. Patient doing well with administering medication without any difficulty.    PICC line flushes well, 10 ml normal saline prior to blood draw, followed by 20 ml normal saline after blood draw.   Arm circumference 32 cm.    Brisk, good blood return.     Obtained blood work as ordered, and it will be taken to the [facility] after visit.

## 2023-11-09 ENCOUNTER — OFFICE VISIT (OUTPATIENT)
Dept: WOUND CARE | Facility: HOSPITAL | Age: 58
End: 2023-11-09
Payer: COMMERCIAL

## 2023-11-09 LAB
GLUCOSE BLD MANUAL STRIP-MCNC: 158 MG/DL (ref 74–99)
GLUCOSE BLD MANUAL STRIP-MCNC: 99 MG/DL (ref 74–99)

## 2023-11-09 PROCEDURE — 99183 HYPERBARIC OXYGEN THERAPY: CPT | Performed by: NURSE PRACTITIONER

## 2023-11-09 PROCEDURE — 82947 ASSAY GLUCOSE BLOOD QUANT: CPT

## 2023-11-09 PROCEDURE — 1036F TOBACCO NON-USER: CPT | Performed by: NURSE PRACTITIONER

## 2023-11-09 PROCEDURE — 3044F HG A1C LEVEL LT 7.0%: CPT | Performed by: NURSE PRACTITIONER

## 2023-11-09 PROCEDURE — 82962 GLUCOSE BLOOD TEST: CPT | Mod: 59

## 2023-11-09 PROCEDURE — G0277 HBOT, FULL BODY CHAMBER, 30M: HCPCS

## 2023-11-09 PROCEDURE — 3008F BODY MASS INDEX DOCD: CPT | Performed by: NURSE PRACTITIONER

## 2023-11-10 ENCOUNTER — OFFICE VISIT (OUTPATIENT)
Dept: WOUND CARE | Facility: HOSPITAL | Age: 58
End: 2023-11-10
Payer: COMMERCIAL

## 2023-11-10 LAB
GLUCOSE BLD MANUAL STRIP-MCNC: 111 MG/DL (ref 74–99)
GLUCOSE BLD MANUAL STRIP-MCNC: 182 MG/DL (ref 74–99)

## 2023-11-10 PROCEDURE — 82962 GLUCOSE BLOOD TEST: CPT | Mod: 59

## 2023-11-10 PROCEDURE — G0277 HBOT, FULL BODY CHAMBER, 30M: HCPCS

## 2023-11-10 PROCEDURE — 82947 ASSAY GLUCOSE BLOOD QUANT: CPT

## 2023-11-13 ENCOUNTER — OFFICE VISIT (OUTPATIENT)
Dept: WOUND CARE | Facility: HOSPITAL | Age: 58
End: 2023-11-13
Payer: COMMERCIAL

## 2023-11-13 LAB
GLUCOSE BLD MANUAL STRIP-MCNC: 188 MG/DL (ref 74–99)
GLUCOSE BLD MANUAL STRIP-MCNC: 97 MG/DL (ref 74–99)

## 2023-11-13 PROCEDURE — 99183 HYPERBARIC OXYGEN THERAPY: CPT | Performed by: NURSE PRACTITIONER

## 2023-11-13 PROCEDURE — 1036F TOBACCO NON-USER: CPT | Performed by: NURSE PRACTITIONER

## 2023-11-13 PROCEDURE — 82947 ASSAY GLUCOSE BLOOD QUANT: CPT

## 2023-11-13 PROCEDURE — G0277 HBOT, FULL BODY CHAMBER, 30M: HCPCS

## 2023-11-13 PROCEDURE — 3008F BODY MASS INDEX DOCD: CPT | Performed by: NURSE PRACTITIONER

## 2023-11-13 PROCEDURE — 3044F HG A1C LEVEL LT 7.0%: CPT | Performed by: NURSE PRACTITIONER

## 2023-11-13 PROCEDURE — 82962 GLUCOSE BLOOD TEST: CPT | Mod: 91

## 2023-11-14 ENCOUNTER — OFFICE VISIT (OUTPATIENT)
Dept: WOUND CARE | Facility: HOSPITAL | Age: 58
End: 2023-11-14
Payer: COMMERCIAL

## 2023-11-14 LAB
GLUCOSE BLD MANUAL STRIP-MCNC: 125 MG/DL (ref 74–99)
GLUCOSE BLD MANUAL STRIP-MCNC: 246 MG/DL (ref 74–99)

## 2023-11-14 PROCEDURE — 82962 GLUCOSE BLOOD TEST: CPT | Mod: 91

## 2023-11-14 PROCEDURE — G0277 HBOT, FULL BODY CHAMBER, 30M: HCPCS

## 2023-11-14 PROCEDURE — 82947 ASSAY GLUCOSE BLOOD QUANT: CPT

## 2023-11-15 ENCOUNTER — HOME CARE VISIT (OUTPATIENT)
Dept: HOME HEALTH SERVICES | Facility: HOME HEALTH | Age: 58
End: 2023-11-15
Payer: COMMERCIAL

## 2023-11-15 ENCOUNTER — APPOINTMENT (OUTPATIENT)
Dept: WOUND CARE | Facility: HOSPITAL | Age: 58
End: 2023-11-15
Payer: COMMERCIAL

## 2023-11-15 ENCOUNTER — LAB (OUTPATIENT)
Dept: LAB | Facility: LAB | Age: 58
End: 2023-11-15
Payer: COMMERCIAL

## 2023-11-15 DIAGNOSIS — N61.1 ABSCESS OF THE BREAST AND NIPPLE: Primary | ICD-10-CM

## 2023-11-15 LAB
ALBUMIN SERPL BCP-MCNC: 4.2 G/DL (ref 3.4–5)
ALP SERPL-CCNC: 167 U/L (ref 33–110)
ALT SERPL W P-5'-P-CCNC: 24 U/L (ref 7–45)
ANION GAP SERPL CALC-SCNC: 13 MMOL/L (ref 10–20)
AST SERPL W P-5'-P-CCNC: 32 U/L (ref 9–39)
BASOPHILS # BLD AUTO: 0.04 X10*3/UL (ref 0–0.1)
BASOPHILS NFR BLD AUTO: 0.9 %
BILIRUB SERPL-MCNC: 0.5 MG/DL (ref 0–1.2)
BUN SERPL-MCNC: 14 MG/DL (ref 6–23)
CALCIUM SERPL-MCNC: 9.6 MG/DL (ref 8.6–10.3)
CHLORIDE SERPL-SCNC: 110 MMOL/L (ref 98–107)
CK SERPL-CCNC: 38 U/L (ref 0–215)
CO2 SERPL-SCNC: 24 MMOL/L (ref 21–32)
CREAT SERPL-MCNC: 0.55 MG/DL (ref 0.5–1.05)
EOSINOPHIL # BLD AUTO: 0.11 X10*3/UL (ref 0–0.7)
EOSINOPHIL NFR BLD AUTO: 2.4 %
ERYTHROCYTE [DISTWIDTH] IN BLOOD BY AUTOMATED COUNT: 14 % (ref 11.5–14.5)
GFR SERPL CREATININE-BSD FRML MDRD: >90 ML/MIN/1.73M*2
GLUCOSE SERPL-MCNC: 167 MG/DL (ref 74–99)
HCT VFR BLD AUTO: 34.1 % (ref 36–46)
HGB BLD-MCNC: 10.8 G/DL (ref 12–16)
IMM GRANULOCYTES # BLD AUTO: 0.01 X10*3/UL (ref 0–0.7)
IMM GRANULOCYTES NFR BLD AUTO: 0.2 % (ref 0–0.9)
LYMPHOCYTES # BLD AUTO: 1.33 X10*3/UL (ref 1.2–4.8)
LYMPHOCYTES NFR BLD AUTO: 28.9 %
MCH RBC QN AUTO: 32.4 PG (ref 26–34)
MCHC RBC AUTO-ENTMCNC: 31.7 G/DL (ref 32–36)
MCV RBC AUTO: 102 FL (ref 80–100)
MONOCYTES # BLD AUTO: 0.5 X10*3/UL (ref 0.1–1)
MONOCYTES NFR BLD AUTO: 10.8 %
NEUTROPHILS # BLD AUTO: 2.62 X10*3/UL (ref 1.2–7.7)
NEUTROPHILS NFR BLD AUTO: 56.8 %
NRBC BLD-RTO: 0 /100 WBCS (ref 0–0)
PLATELET # BLD AUTO: 95 X10*3/UL (ref 150–450)
POTASSIUM SERPL-SCNC: 3.8 MMOL/L (ref 3.5–5.3)
PROT SERPL-MCNC: 7.6 G/DL (ref 6.4–8.2)
RBC # BLD AUTO: 3.33 X10*6/UL (ref 4–5.2)
SODIUM SERPL-SCNC: 143 MMOL/L (ref 136–145)
WBC # BLD AUTO: 4.6 X10*3/UL (ref 4.4–11.3)

## 2023-11-15 PROCEDURE — 82550 ASSAY OF CK (CPK): CPT

## 2023-11-15 PROCEDURE — G0299 HHS/HOSPICE OF RN EA 15 MIN: HCPCS

## 2023-11-15 PROCEDURE — G0180 MD CERTIFICATION HHA PATIENT: HCPCS | Performed by: INTERNAL MEDICINE

## 2023-11-15 PROCEDURE — 85025 COMPLETE CBC W/AUTO DIFF WBC: CPT

## 2023-11-15 PROCEDURE — 80053 COMPREHEN METABOLIC PANEL: CPT

## 2023-11-15 ASSESSMENT — ENCOUNTER SYMPTOMS
APPETITE LEVEL: GOOD
CHANGE IN APPETITE: UNCHANGED
LOWEST PAIN SEVERITY IN PAST 24 HOURS: 0/10
PAIN LOCATION - RELIEVING FACTORS: REST, PAIN MEDICATION
PERSON REPORTING PAIN: PATIENT
SUBJECTIVE PAIN PROGRESSION: GRADUALLY IMPROVING
PAIN LOCATION - PAIN SEVERITY: 2/10
PAIN LOCATION: CHEST
PAIN LOCATION - PAIN QUALITY: SORE, ACHING
PAIN LOCATION - PAIN DURATION: SINCE SURGERY
PAIN: 1
NAUSEA: 1
HIGHEST PAIN SEVERITY IN PAST 24 HOURS: 2/10
PAIN LOCATION - PAIN FREQUENCY: INTERMITTENT
PAIN SEVERITY GOAL: 0/10

## 2023-11-15 NOTE — CASE COMMUNICATION
Last scheduled dose of medication is this evening. Please inform as to whether we can remove the PICC line, antibiotic therapy will continue and be extended, or if we need to maintain the PICC line, so I can advise the patient and provide care as it's needed.    Bloodwork was done at today's visit.

## 2023-11-15 NOTE — HOME HEALTH
Patient is identified by name and .    She states no pain or discomfort.  Day prior had some nausea, slight emesis - relating to directly after taking antibiotics. without food. She has had no prior issues with said medication.  She saw Dr. Dorman day prior, one of the JIL drains was removed, the other is scheduled to be removed tomorrow at her next follow up visit.      Pravena wound vac was removed last week.    Blood work obtained from PICC line, without any difficulty.  Good , brisk blood return. Labwork will be taken to the [facility] urgent care after visit.   10 ml normal saline flush prior to blood draw, followed by 20 ml normal saline flush after blood draw. Power PICC solo.     Instructed patient to contact home care office should she have any questions or concerns.    Medication is scheduled to end after this evening's dose, but there are no further orders at this time, to remove, or maintain PICC line. Will await results from labwork and follow up with pharmacy and Dr. Harris if needed.    PICC line dressing changed, using sterile technique.  Cap and extension tubing also.  33 cm circumference. 3 cm out.    Toe wound being treated and followed by wound care clinic. Patient goes to see Dr. GOMEZ later today, so no wound care done at this visit. She has enough supplies in the home. and additionally she has started back with hyperbaric treatments.

## 2023-11-16 ENCOUNTER — HOME CARE VISIT (OUTPATIENT)
Dept: HOME HEALTH SERVICES | Facility: HOME HEALTH | Age: 58
End: 2023-11-16
Payer: COMMERCIAL

## 2023-11-16 ENCOUNTER — HOME INFUSION (OUTPATIENT)
Dept: INFUSION THERAPY | Age: 58
End: 2023-11-16
Payer: COMMERCIAL

## 2023-11-16 ENCOUNTER — APPOINTMENT (OUTPATIENT)
Dept: WOUND CARE | Facility: HOSPITAL | Age: 58
End: 2023-11-16
Payer: COMMERCIAL

## 2023-11-16 DIAGNOSIS — N61.0 CELLULITIS OF LEFT BREAST: Primary | ICD-10-CM

## 2023-11-16 PROCEDURE — G0299 HHS/HOSPICE OF RN EA 15 MIN: HCPCS

## 2023-11-16 NOTE — HOME HEALTH
Jeff Davis back from pharmacy. They spoke with Dr. Harris's office regarding PICC (see previous notes).  Assistant states that they want to think about it and review it tomorrow before deciding whether medications will continue or not, and what the plan will be.    I moved patient's added-on visit to Friday. her last dose of medication is tonight. She knows to flush it daily with normal saline, for catheter maintenance. I reviewed this with the patient and she has enough supplies in the home.    Will follow up in AM.

## 2023-11-16 NOTE — PROGRESS NOTES
Received orders from Dr ALVAREZ VIA ARMEN to stop IV ANTIBIOTICS  and HC nursing to pull picc line. Discharge from Pharmacy services.   Order entered into [facility].. SECURE CHAT TO RN,  GOLD COPY email sent to intake.  Pt notified BY RX AND RN  Discharge pt from [facility] and d/c chart

## 2023-11-17 ENCOUNTER — APPOINTMENT (OUTPATIENT)
Dept: WOUND CARE | Facility: HOSPITAL | Age: 58
End: 2023-11-17
Payer: COMMERCIAL

## 2023-11-17 ASSESSMENT — ENCOUNTER SYMPTOMS
PERSON REPORTING PAIN: PATIENT
PAIN: 1
PAIN LOCATION - PAIN FREQUENCY: INFREQUENT
PAIN LOCATION - PAIN SEVERITY: 2/10
PAIN LOCATION - RELIEVING FACTORS: REST
PAIN LOCATION: CHEST
LOWEST PAIN SEVERITY IN PAST 24 HOURS: 0/10
PAIN LOCATION - PAIN QUALITY: SORE, ACHING
HIGHEST PAIN SEVERITY IN PAST 24 HOURS: 2/10
PAIN LOCATION - PAIN DURATION: SINCE SURGERY
SUBJECTIVE PAIN PROGRESSION: GRADUALLY IMPROVING
PAIN SEVERITY GOAL: 0/10

## 2023-11-17 ASSESSMENT — ACTIVITIES OF DAILY LIVING (ADL)
PHYSICAL TRANSFERS ASSESSED: 1
CURRENT_FUNCTION: INDEPENDENT
AMBULATION ASSISTANCE: 1
HOME_HEALTH_OASIS: 00
AMBULATION ASSISTANCE: INDEPENDENT
OASIS_M1830: 00

## 2023-11-20 ENCOUNTER — OFFICE VISIT (OUTPATIENT)
Dept: WOUND CARE | Facility: HOSPITAL | Age: 58
End: 2023-11-20
Payer: COMMERCIAL

## 2023-11-20 LAB
GLUCOSE BLD MANUAL STRIP-MCNC: 117 MG/DL (ref 74–99)
GLUCOSE BLD MANUAL STRIP-MCNC: 88 MG/DL (ref 74–99)
GLUCOSE BLD MANUAL STRIP-MCNC: 95 MG/DL (ref 74–99)

## 2023-11-20 PROCEDURE — 82962 GLUCOSE BLOOD TEST: CPT | Mod: 91

## 2023-11-20 PROCEDURE — 82947 ASSAY GLUCOSE BLOOD QUANT: CPT

## 2023-11-20 PROCEDURE — 99212 OFFICE O/P EST SF 10 MIN: CPT | Mod: 25

## 2023-11-21 ENCOUNTER — OFFICE VISIT (OUTPATIENT)
Dept: WOUND CARE | Facility: HOSPITAL | Age: 58
End: 2023-11-21
Payer: COMMERCIAL

## 2023-11-21 LAB
GLUCOSE BLD MANUAL STRIP-MCNC: 126 MG/DL (ref 74–99)
GLUCOSE BLD MANUAL STRIP-MCNC: 197 MG/DL (ref 74–99)

## 2023-11-21 PROCEDURE — G0277 HBOT, FULL BODY CHAMBER, 30M: HCPCS

## 2023-11-21 PROCEDURE — 82962 GLUCOSE BLOOD TEST: CPT | Mod: 91

## 2023-11-21 PROCEDURE — 82947 ASSAY GLUCOSE BLOOD QUANT: CPT

## 2023-11-22 ENCOUNTER — APPOINTMENT (OUTPATIENT)
Dept: WOUND CARE | Facility: HOSPITAL | Age: 58
End: 2023-11-22
Payer: COMMERCIAL

## 2023-11-22 ENCOUNTER — OFFICE VISIT (OUTPATIENT)
Dept: WOUND CARE | Facility: HOSPITAL | Age: 58
End: 2023-11-22
Payer: COMMERCIAL

## 2023-11-22 LAB
GLUCOSE BLD MANUAL STRIP-MCNC: 109 MG/DL (ref 74–99)
GLUCOSE BLD MANUAL STRIP-MCNC: 154 MG/DL (ref 74–99)

## 2023-11-22 PROCEDURE — 82947 ASSAY GLUCOSE BLOOD QUANT: CPT

## 2023-11-22 PROCEDURE — 82962 GLUCOSE BLOOD TEST: CPT | Mod: 59

## 2023-11-22 PROCEDURE — G0277 HBOT, FULL BODY CHAMBER, 30M: HCPCS

## 2023-11-25 ENCOUNTER — HOSPITAL ENCOUNTER (INPATIENT)
Facility: HOSPITAL | Age: 58
LOS: 6 days | Discharge: HOME | DRG: 908 | End: 2023-12-01
Attending: STUDENT IN AN ORGANIZED HEALTH CARE EDUCATION/TRAINING PROGRAM | Admitting: INTERNAL MEDICINE
Payer: COMMERCIAL

## 2023-11-25 ENCOUNTER — APPOINTMENT (OUTPATIENT)
Dept: RADIOLOGY | Facility: HOSPITAL | Age: 58
DRG: 908 | End: 2023-11-25
Payer: COMMERCIAL

## 2023-11-25 DIAGNOSIS — J30.2 SEASONAL ALLERGIES: ICD-10-CM

## 2023-11-25 DIAGNOSIS — Z85.3 HISTORY OF BREAST CANCER: ICD-10-CM

## 2023-11-25 DIAGNOSIS — E11.69 DIABETES MELLITUS TYPE 2 IN OBESE: ICD-10-CM

## 2023-11-25 DIAGNOSIS — N61.0 CELLULITIS OF LEFT BREAST: Primary | ICD-10-CM

## 2023-11-25 DIAGNOSIS — N64.89 SEROMA OF BREAST: ICD-10-CM

## 2023-11-25 DIAGNOSIS — E66.9 DIABETES MELLITUS TYPE 2 IN OBESE: ICD-10-CM

## 2023-11-25 LAB
ALBUMIN SERPL-MCNC: 4.4 G/DL (ref 3.5–5)
ALP BLD-CCNC: 168 U/L (ref 35–125)
ALT SERPL-CCNC: 30 U/L (ref 5–40)
ANION GAP SERPL CALC-SCNC: 10 MMOL/L
AST SERPL-CCNC: 47 U/L (ref 5–40)
BASOPHILS # BLD AUTO: 0.03 X10*3/UL (ref 0–0.1)
BASOPHILS NFR BLD AUTO: 0.5 %
BILIRUB SERPL-MCNC: 0.7 MG/DL (ref 0.1–1.2)
BUN SERPL-MCNC: 15 MG/DL (ref 8–25)
CALCIUM SERPL-MCNC: 9.7 MG/DL (ref 8.5–10.4)
CHLORIDE SERPL-SCNC: 104 MMOL/L (ref 97–107)
CO2 SERPL-SCNC: 24 MMOL/L (ref 24–31)
CREAT SERPL-MCNC: 0.6 MG/DL (ref 0.4–1.6)
EOSINOPHIL # BLD AUTO: 0.21 X10*3/UL (ref 0–0.7)
EOSINOPHIL NFR BLD AUTO: 3.6 %
ERYTHROCYTE [DISTWIDTH] IN BLOOD BY AUTOMATED COUNT: 13.5 % (ref 11.5–14.5)
GFR SERPL CREATININE-BSD FRML MDRD: >90 ML/MIN/1.73M*2
GLUCOSE BLD MANUAL STRIP-MCNC: 163 MG/DL (ref 74–99)
GLUCOSE BLD MANUAL STRIP-MCNC: 81 MG/DL (ref 74–99)
GLUCOSE SERPL-MCNC: 89 MG/DL (ref 65–99)
HCT VFR BLD AUTO: 34.9 % (ref 36–46)
HGB BLD-MCNC: 11.5 G/DL (ref 12–16)
IMM GRANULOCYTES # BLD AUTO: 0.01 X10*3/UL (ref 0–0.7)
IMM GRANULOCYTES NFR BLD AUTO: 0.2 % (ref 0–0.9)
LACTATE BLDV-SCNC: 1.4 MMOL/L (ref 0.4–2)
LYMPHOCYTES # BLD AUTO: 1.45 X10*3/UL (ref 1.2–4.8)
LYMPHOCYTES NFR BLD AUTO: 25.1 %
MCH RBC QN AUTO: 32.1 PG (ref 26–34)
MCHC RBC AUTO-ENTMCNC: 33 G/DL (ref 32–36)
MCV RBC AUTO: 98 FL (ref 80–100)
MONOCYTES # BLD AUTO: 0.48 X10*3/UL (ref 0.1–1)
MONOCYTES NFR BLD AUTO: 8.3 %
NEUTROPHILS # BLD AUTO: 3.6 X10*3/UL (ref 1.2–7.7)
NEUTROPHILS NFR BLD AUTO: 62.3 %
NRBC BLD-RTO: 0 /100 WBCS (ref 0–0)
PLATELET # BLD AUTO: 100 X10*3/UL (ref 150–450)
POTASSIUM SERPL-SCNC: 3.9 MMOL/L (ref 3.4–5.1)
PROT SERPL-MCNC: 8 G/DL (ref 5.9–7.9)
RBC # BLD AUTO: 3.58 X10*6/UL (ref 4–5.2)
SODIUM SERPL-SCNC: 138 MMOL/L (ref 133–145)
WBC # BLD AUTO: 5.8 X10*3/UL (ref 4.4–11.3)

## 2023-11-25 PROCEDURE — 82947 ASSAY GLUCOSE BLOOD QUANT: CPT

## 2023-11-25 PROCEDURE — 2550000001 HC RX 255 CONTRASTS: Performed by: INTERNAL MEDICINE

## 2023-11-25 PROCEDURE — 87040 BLOOD CULTURE FOR BACTERIA: CPT | Mod: TRILAB | Performed by: STUDENT IN AN ORGANIZED HEALTH CARE EDUCATION/TRAINING PROGRAM

## 2023-11-25 PROCEDURE — 2500000001 HC RX 250 WO HCPCS SELF ADMINISTERED DRUGS (ALT 637 FOR MEDICARE OP): Performed by: NURSE PRACTITIONER

## 2023-11-25 PROCEDURE — 85025 COMPLETE CBC W/AUTO DIFF WBC: CPT | Performed by: STUDENT IN AN ORGANIZED HEALTH CARE EDUCATION/TRAINING PROGRAM

## 2023-11-25 PROCEDURE — 1100000001 HC PRIVATE ROOM DAILY

## 2023-11-25 PROCEDURE — 2500000004 HC RX 250 GENERAL PHARMACY W/ HCPCS (ALT 636 FOR OP/ED): Mod: JZ | Performed by: STUDENT IN AN ORGANIZED HEALTH CARE EDUCATION/TRAINING PROGRAM

## 2023-11-25 PROCEDURE — 96365 THER/PROPH/DIAG IV INF INIT: CPT

## 2023-11-25 PROCEDURE — 80053 COMPREHEN METABOLIC PANEL: CPT | Performed by: STUDENT IN AN ORGANIZED HEALTH CARE EDUCATION/TRAINING PROGRAM

## 2023-11-25 PROCEDURE — 99285 EMERGENCY DEPT VISIT HI MDM: CPT | Performed by: STUDENT IN AN ORGANIZED HEALTH CARE EDUCATION/TRAINING PROGRAM

## 2023-11-25 PROCEDURE — 96368 THER/DIAG CONCURRENT INF: CPT

## 2023-11-25 PROCEDURE — 2500000004 HC RX 250 GENERAL PHARMACY W/ HCPCS (ALT 636 FOR OP/ED): Performed by: NURSE PRACTITIONER

## 2023-11-25 PROCEDURE — 87075 CULTR BACTERIA EXCEPT BLOOD: CPT | Mod: TRILAB | Performed by: STUDENT IN AN ORGANIZED HEALTH CARE EDUCATION/TRAINING PROGRAM

## 2023-11-25 PROCEDURE — 83605 ASSAY OF LACTIC ACID: CPT | Performed by: STUDENT IN AN ORGANIZED HEALTH CARE EDUCATION/TRAINING PROGRAM

## 2023-11-25 PROCEDURE — 36415 COLL VENOUS BLD VENIPUNCTURE: CPT | Performed by: STUDENT IN AN ORGANIZED HEALTH CARE EDUCATION/TRAINING PROGRAM

## 2023-11-25 PROCEDURE — 71260 CT THORAX DX C+: CPT

## 2023-11-25 RX ORDER — GUAIFENESIN/DEXTROMETHORPHAN 100-10MG/5
5 SYRUP ORAL EVERY 4 HOURS PRN
Status: DISCONTINUED | OUTPATIENT
Start: 2023-11-25 | End: 2023-12-01 | Stop reason: HOSPADM

## 2023-11-25 RX ORDER — DAPTOMYCIN IN SODIUM CHLORIDE 500 MG/50ML
6 INJECTION, SOLUTION INTRAVENOUS ONCE
Status: COMPLETED | OUTPATIENT
Start: 2023-11-25 | End: 2023-11-25

## 2023-11-25 RX ORDER — ATORVASTATIN CALCIUM 20 MG/1
20 TABLET, FILM COATED ORAL NIGHTLY
Status: DISCONTINUED | OUTPATIENT
Start: 2023-11-25 | End: 2023-12-01 | Stop reason: HOSPADM

## 2023-11-25 RX ORDER — ACETAMINOPHEN 325 MG/1
650 TABLET ORAL EVERY 4 HOURS PRN
Status: DISCONTINUED | OUTPATIENT
Start: 2023-11-25 | End: 2023-11-27

## 2023-11-25 RX ORDER — LOSARTAN POTASSIUM 50 MG/1
50 TABLET ORAL DAILY
Status: DISCONTINUED | OUTPATIENT
Start: 2023-11-26 | End: 2023-12-01 | Stop reason: HOSPADM

## 2023-11-25 RX ORDER — FERROUS GLUCONATE 325 MG
38 TABLET ORAL
Status: DISCONTINUED | OUTPATIENT
Start: 2023-11-26 | End: 2023-12-01 | Stop reason: HOSPADM

## 2023-11-25 RX ORDER — ENOXAPARIN SODIUM 100 MG/ML
40 INJECTION SUBCUTANEOUS EVERY 24 HOURS
Status: DISCONTINUED | OUTPATIENT
Start: 2023-11-25 | End: 2023-11-29

## 2023-11-25 RX ORDER — INSULIN LISPRO 100 [IU]/ML
0-5 INJECTION, SOLUTION INTRAVENOUS; SUBCUTANEOUS
Status: DISCONTINUED | OUTPATIENT
Start: 2023-11-26 | End: 2023-11-28

## 2023-11-25 RX ORDER — DAPTOMYCIN IN SODIUM CHLORIDE 500 MG/50ML
8 INJECTION, SOLUTION INTRAVENOUS
Status: COMPLETED | OUTPATIENT
Start: 2023-11-26 | End: 2023-11-26

## 2023-11-25 RX ORDER — SERTRALINE HYDROCHLORIDE 100 MG/1
100 TABLET, FILM COATED ORAL 2 TIMES DAILY
Status: DISCONTINUED | OUTPATIENT
Start: 2023-11-25 | End: 2023-12-01 | Stop reason: HOSPADM

## 2023-11-25 RX ORDER — AMLODIPINE BESYLATE 10 MG/1
10 TABLET ORAL DAILY
Status: DISCONTINUED | OUTPATIENT
Start: 2023-11-25 | End: 2023-12-01 | Stop reason: HOSPADM

## 2023-11-25 RX ORDER — METOPROLOL SUCCINATE 50 MG/1
50 TABLET, EXTENDED RELEASE ORAL DAILY
Status: DISCONTINUED | OUTPATIENT
Start: 2023-11-25 | End: 2023-12-01 | Stop reason: HOSPADM

## 2023-11-25 RX ORDER — POLYETHYLENE GLYCOL 3350 17 G/17G
17 POWDER, FOR SOLUTION ORAL DAILY PRN
Status: DISCONTINUED | OUTPATIENT
Start: 2023-11-25 | End: 2023-12-01 | Stop reason: HOSPADM

## 2023-11-25 RX ORDER — ASCORBIC ACID 500 MG
250 TABLET ORAL 2 TIMES DAILY
Status: DISCONTINUED | OUTPATIENT
Start: 2023-11-25 | End: 2023-12-01 | Stop reason: HOSPADM

## 2023-11-25 RX ORDER — GABAPENTIN 400 MG/1
400 CAPSULE ORAL 3 TIMES DAILY
Status: DISCONTINUED | OUTPATIENT
Start: 2023-11-25 | End: 2023-12-01 | Stop reason: HOSPADM

## 2023-11-25 RX ORDER — HYDROCHLOROTHIAZIDE 12.5 MG/1
12.5 CAPSULE ORAL DAILY
Status: DISCONTINUED | OUTPATIENT
Start: 2023-11-26 | End: 2023-12-01 | Stop reason: HOSPADM

## 2023-11-25 RX ORDER — ENOXAPARIN SODIUM 100 MG/ML
40 INJECTION SUBCUTANEOUS EVERY 24 HOURS
Status: CANCELLED | OUTPATIENT
Start: 2023-11-26

## 2023-11-25 RX ORDER — DEXTROSE MONOHYDRATE 100 MG/ML
0.3 INJECTION, SOLUTION INTRAVENOUS ONCE AS NEEDED
Status: DISCONTINUED | OUTPATIENT
Start: 2023-11-25 | End: 2023-12-01 | Stop reason: HOSPADM

## 2023-11-25 RX ORDER — DEXTROSE 50 % IN WATER (D50W) INTRAVENOUS SYRINGE
25
Status: DISCONTINUED | OUTPATIENT
Start: 2023-11-25 | End: 2023-12-01 | Stop reason: HOSPADM

## 2023-11-25 RX ADMIN — ATORVASTATIN CALCIUM 20 MG: 20 TABLET, FILM COATED ORAL at 20:00

## 2023-11-25 RX ADMIN — METOPROLOL SUCCINATE 50 MG: 50 TABLET, EXTENDED RELEASE ORAL at 20:00

## 2023-11-25 RX ADMIN — SERTRALINE 100 MG: 100 TABLET, FILM COATED ORAL at 20:00

## 2023-11-25 RX ADMIN — CEFEPIME 2 G: 2 INJECTION, POWDER, FOR SOLUTION INTRAVENOUS at 15:18

## 2023-11-25 RX ADMIN — CEFEPIME 2 G: 2 INJECTION, POWDER, FOR SOLUTION INTRAVENOUS at 23:46

## 2023-11-25 RX ADMIN — OXYCODONE HYDROCHLORIDE AND ACETAMINOPHEN 250 MG: 500 TABLET ORAL at 20:00

## 2023-11-25 RX ADMIN — AMLODIPINE BESYLATE 10 MG: 10 TABLET ORAL at 20:00

## 2023-11-25 RX ADMIN — DAPTOMYCIN IN SODIUM CHLORIDE 500 MG: 500 INJECTION, SOLUTION INTRAVENOUS at 15:19

## 2023-11-25 RX ADMIN — GABAPENTIN 400 MG: 400 CAPSULE ORAL at 20:00

## 2023-11-25 RX ADMIN — IOHEXOL 75 ML: 350 INJECTION, SOLUTION INTRAVENOUS at 19:27

## 2023-11-25 SDOH — SOCIAL STABILITY: SOCIAL INSECURITY: HAVE YOU HAD THOUGHTS OF HARMING ANYONE ELSE?: NO

## 2023-11-25 SDOH — SOCIAL STABILITY: SOCIAL INSECURITY: ABUSE: ADULT

## 2023-11-25 SDOH — SOCIAL STABILITY: SOCIAL INSECURITY: DO YOU FEEL ANYONE HAS EXPLOITED OR TAKEN ADVANTAGE OF YOU FINANCIALLY OR OF YOUR PERSONAL PROPERTY?: NO

## 2023-11-25 SDOH — SOCIAL STABILITY: SOCIAL INSECURITY: ARE THERE ANY APPARENT SIGNS OF INJURIES/BEHAVIORS THAT COULD BE RELATED TO ABUSE/NEGLECT?: NO

## 2023-11-25 SDOH — SOCIAL STABILITY: SOCIAL INSECURITY: ARE YOU OR HAVE YOU BEEN THREATENED OR ABUSED PHYSICALLY, EMOTIONALLY, OR SEXUALLY BY ANYONE?: NO

## 2023-11-25 SDOH — SOCIAL STABILITY: SOCIAL INSECURITY: HAS ANYONE EVER THREATENED TO HURT YOUR FAMILY OR YOUR PETS?: NO

## 2023-11-25 SDOH — SOCIAL STABILITY: SOCIAL INSECURITY: DOES ANYONE TRY TO KEEP YOU FROM HAVING/CONTACTING OTHER FRIENDS OR DOING THINGS OUTSIDE YOUR HOME?: NO

## 2023-11-25 SDOH — SOCIAL STABILITY: SOCIAL INSECURITY: DO YOU FEEL UNSAFE GOING BACK TO THE PLACE WHERE YOU ARE LIVING?: NO

## 2023-11-25 ASSESSMENT — ACTIVITIES OF DAILY LIVING (ADL)
HEARING - LEFT EAR: FUNCTIONAL
WALKS IN HOME: INDEPENDENT
GROOMING: INDEPENDENT
PATIENT'S MEMORY ADEQUATE TO SAFELY COMPLETE DAILY ACTIVITIES?: YES
DRESSING YOURSELF: INDEPENDENT
TOILETING: INDEPENDENT
BATHING: INDEPENDENT
JUDGMENT_ADEQUATE_SAFELY_COMPLETE_DAILY_ACTIVITIES: YES
HEARING - RIGHT EAR: FUNCTIONAL
ADEQUATE_TO_COMPLETE_ADL: YES
FEEDING YOURSELF: INDEPENDENT
LACK_OF_TRANSPORTATION: NO

## 2023-11-25 ASSESSMENT — ENCOUNTER SYMPTOMS
RESPIRATORY NEGATIVE: 1
MUSCULOSKELETAL NEGATIVE: 1
NEUROLOGICAL NEGATIVE: 1
CONSTITUTIONAL NEGATIVE: 1
ENDOCRINE NEGATIVE: 1
CARDIOVASCULAR NEGATIVE: 1
PSYCHIATRIC NEGATIVE: 1
GASTROINTESTINAL NEGATIVE: 1
WOUND: 1
EYES NEGATIVE: 1

## 2023-11-25 ASSESSMENT — COGNITIVE AND FUNCTIONAL STATUS - GENERAL
DAILY ACTIVITIY SCORE: 24
MOBILITY SCORE: 24
PATIENT BASELINE BEDBOUND: NO
MOBILITY SCORE: 24

## 2023-11-25 ASSESSMENT — LIFESTYLE VARIABLES
AUDIT-C TOTAL SCORE: 2
AUDIT-C TOTAL SCORE: 2
HOW OFTEN DO YOU HAVE A DRINK CONTAINING ALCOHOL: MONTHLY OR LESS
HOW OFTEN DO YOU HAVE 6 OR MORE DRINKS ON ONE OCCASION: LESS THAN MONTHLY
SKIP TO QUESTIONS 9-10: 0
HOW MANY STANDARD DRINKS CONTAINING ALCOHOL DO YOU HAVE ON A TYPICAL DAY: 1 OR 2

## 2023-11-25 ASSESSMENT — PAIN SCALES - GENERAL
PAINLEVEL_OUTOF10: 0 - NO PAIN
PAINLEVEL_OUTOF10: 0 - NO PAIN

## 2023-11-25 ASSESSMENT — COLUMBIA-SUICIDE SEVERITY RATING SCALE - C-SSRS
2. HAVE YOU ACTUALLY HAD ANY THOUGHTS OF KILLING YOURSELF?: NO
6. HAVE YOU EVER DONE ANYTHING, STARTED TO DO ANYTHING, OR PREPARED TO DO ANYTHING TO END YOUR LIFE?: NO
1. IN THE PAST MONTH, HAVE YOU WISHED YOU WERE DEAD OR WISHED YOU COULD GO TO SLEEP AND NOT WAKE UP?: NO

## 2023-11-25 ASSESSMENT — PATIENT HEALTH QUESTIONNAIRE - PHQ9
1. LITTLE INTEREST OR PLEASURE IN DOING THINGS: NOT AT ALL
SUM OF ALL RESPONSES TO PHQ9 QUESTIONS 1 & 2: 0
2. FEELING DOWN, DEPRESSED OR HOPELESS: NOT AT ALL

## 2023-11-25 ASSESSMENT — PAIN - FUNCTIONAL ASSESSMENT
PAIN_FUNCTIONAL_ASSESSMENT: 0-10
PAIN_FUNCTIONAL_ASSESSMENT: 0-10

## 2023-11-25 NOTE — ED PROVIDER NOTES
HPI   Chief Complaint   Patient presents with    Wound Infection     Pt had a double mastectomy.  States she got an infection and was taking antibiotics IV.  States she is currently taking oral antibiotics.  States she has redness on the left side of her chest.  Denies pain.  States her doctor told her to come to the ED to get IV antibiotics again.        This is a 58-year-old female with a past medical history of HTN, HLD, NIDDM breast cancer s/p bilateral mastectomy and breast reconstruction sent to the emergency department by her plastic surgeon for admission to the hospital and IV antibiotics.  Her left breast reconstruction has been completed by cellulitis which was treated with incision and drainage on 11/1, sent home with a PICC line for IV antibiotics.  Those were completed 8 days ago, she was on daptomycin and cefepime.  She has allergies to vancomycin and Zosyn.  For the past few days she has noticed new onset of redness at the underside and medial aspect of the left breast consistent with recurrence of cellulitis.  She has a small amount of drainage from the wound in the center of the breast after reconstruction when she change the bandage but no gross purulent drainage.  She denies any pain but with a few days of worsening redness she was instructed to come to the ED for admission and IV antibiotics, infectious disease consult by her surgeon.  He did call the emergency department to relay his plan.  He does request that the patient be admitted to the hospitalist service to be the primary team to manage further comorbidities and he will be on consult as well as infectious disease provider.      History provided by:  Patient   used: No                        No data recorded                Patient History   Past Medical History:   Diagnosis Date    Breast CA (CMS/[facility])     Contact with and (suspected) exposure to covid-19 03/11/2021    Suspected COVID-19 virus infection    Neoplasm of  unspecified behavior of bone, soft tissue, and skin 08/22/2016    Skin growth    Other mucopurulent conjunctivitis, unspecified eye     Pink eye    Personal history of other benign neoplasm     History of uterine leiomyoma     Past Surgical History:   Procedure Laterality Date    HYSTERECTOMY  05/31/2016    Hysterectomy    MASTECTOMY Bilateral     with repair    OTHER SURGICAL HISTORY  08/12/2021    Vascular surgical procedure    TONSILLECTOMY  05/31/2016    Tonsillectomy     Family History   Problem Relation Name Age of Onset    Diabetes Mother      Cancer Other          Grandparent    Diabetes Other          Grandparent    Cancer Other          Aunt    Diabetes Sibling      Neuropathy Sibling       Social History     Tobacco Use    Smoking status: Never    Smokeless tobacco: Never   Substance Use Topics    Alcohol use: Yes     Alcohol/week: 2.0 - 3.0 standard drinks of alcohol     Types: 2 - 3 Glasses of wine per week    Drug use: Never       Physical Exam   ED Triage Vitals [11/25/23 1413]   Temp Heart Rate Resp BP   36.6 °C (97.9 °F) 86 18 150/72      SpO2 Temp src Heart Rate Source Patient Position   100 % -- -- --      BP Location FiO2 (%)     -- --       Physical Exam  General: well developed, well nourished 58-year-old female who is awake and alert, in no apparent distress.   at bedside.  HENT: normocephalic, atraumatic.   CV: regular rate and rhythm, no murmur, no gallops, or rubs.   Resp: clear to ascultation bilaterally, no wheezes, rales, or rhonchi  GI: abdomen soft, nontender without rigidity or guarding, no peritoneal signs, abdomen is nondistended, no masses palpated  Psych: appropriate mood and affect, cooperative with exam  Skin: warm, dry.  Small area of erythema at the medial left breast is present which is warm to the touch but nontender.  Concerning for recurrence of cellulitis.  No palpable fluctuance.     Labs Reviewed   CBC WITH AUTO DIFFERENTIAL - Abnormal       Result Value    WBC  5.8      nRBC 0.0      RBC 3.58 (*)     Hemoglobin 11.5 (*)     Hematocrit 34.9 (*)     MCV 98      MCH 32.1      MCHC 33.0      RDW 13.5      Platelets 100 (*)     Neutrophils % 62.3      Immature Granulocytes %, Automated 0.2      Lymphocytes % 25.1      Monocytes % 8.3      Eosinophils % 3.6      Basophils % 0.5      Neutrophils Absolute 3.60      Immature Granulocytes Absolute, Automated 0.01      Lymphocytes Absolute 1.45      Monocytes Absolute 0.48      Eosinophils Absolute 0.21      Basophils Absolute 0.03     COMPREHENSIVE METABOLIC PANEL - Abnormal    Glucose 89      Sodium 138      Potassium 3.9      Chloride 104      Bicarbonate 24      Urea Nitrogen 15      Creatinine 0.60      eGFR >90      Calcium 9.7      Albumin 4.4      Alkaline Phosphatase 168 (*)     Total Protein 8.0 (*)     AST 47 (*)     Bilirubin, Total 0.7      ALT 30      Anion Gap 10     CBC - Abnormal    WBC 5.3      nRBC 0.0      RBC 3.60 (*)     Hemoglobin 11.4 (*)     Hematocrit 35.1 (*)     MCV 98      MCH 31.7      MCHC 32.5      RDW 13.7      Platelets 97 (*)    COMPREHENSIVE METABOLIC PANEL - Abnormal    Glucose 121 (*)     Sodium 140      Potassium 3.7      Chloride 105      Bicarbonate 24      Urea Nitrogen 15      Creatinine 0.40      eGFR >90      Calcium 9.3      Albumin 4.1      Alkaline Phosphatase 159 (*)     Total Protein 7.4      AST 40      Bilirubin, Total 0.9      ALT 27      Anion Gap 11     POCT GLUCOSE - Abnormal    POCT Glucose 163 (*)    POCT GLUCOSE - Abnormal    POCT Glucose 129 (*)    POCT GLUCOSE - Abnormal    POCT Glucose 115 (*)    POCT GLUCOSE - Abnormal    POCT Glucose 135 (*)    BLOOD GAS LACTIC ACID, VENOUS - Normal    POCT Lactate, Venous 1.4     POCT GLUCOSE - Normal    POCT Glucose 81     BLOOD CULTURE   BLOOD CULTURE   BASIC METABOLIC PANEL   CBC   POCT GLUCOSE METER       ED Course & MDM   Diagnoses as of 11/26/23 2020   Cellulitis of left breast       Medical Decision Making  PMH: HTN, HLD, NIDDM,  breast cancer s/p bilateral mastectomy and breast reconstruction  History obtained: directly from patient, review chart review from previous hospitalization on 10/31.  Social factors affecting disposition: Patient treated with outpatient antibiotics with recurrence of her symptoms.    She is in no acute distress here, vitals are normal.  Work-up obtained including lactate level, blood cultures.  She was ordered a dose of cefepime and daptomycin which she was on as an outpatient previously with successful treatment of her cellulitis at the time.  There is no leukocytosis, lactate is normal.  She has no infectious symptoms of illness.  I spoke with her plastic surgeon who sent her in who wants her to be admitted to the hospital to the hospitalist service so that he and the infectious disease physician can create a plan to treat her cellulitis which has been recurrent despite treatment with IV antibiotics at home via PICC line and treat with oral antibiotic currently at home.  The patient is agreeable with this plan. She was admitted in stable condition.     Procedure  Procedures     Robbie Hernandez DO  11/26/23 2021

## 2023-11-25 NOTE — H&P
History Of Present Illness  Sabrina Michaud is a 58 y.o. female presenting with redness and warmth to  left breast.  States she has had 2 surgeries for infection of previous mastectomy/reconstruction site. Last course of IV antibiotics completed 11/15. She was started on oral levaquin on 11/22, but symptoms have gotten worse. Has small  amount of drainage on bandage when she changes dressing daily. Denies fevers, chills, pain. Denies chest pain, shortness of breath, abdominal pain, lightheadedness, leg edema.     Past Medical History  Past Medical History:   Diagnosis Date    Breast CA (CMS/[facility])     Diabetes mellitus (CMS/[facility])     Hypertension     Neoplasm of unspecified behavior of bone, soft tissue, and skin 08/22/2016    Skin growth       Surgical History  Past Surgical History:   Procedure Laterality Date    BREAST SURGERY Bilateral     mastectomy with reconstruction    BREAST SURGERY Left     Debridement x 2    HYSTERECTOMY  05/31/2016    Hysterectomy    MASTECTOMY Bilateral     with repair    TONSILLECTOMY  05/31/2016    Tonsillectomy        Social History  She reports that she has never smoked. She has never used smokeless tobacco. She reports that she does not currently use alcohol after a past usage of about 2.0 - 3.0 standard drinks of alcohol per week. She reports that she does not use drugs.    Family History  Family History   Problem Relation Name Age of Onset    Diabetes Mother      Cancer Father      Ovarian cancer Sister      Diabetes Sibling      Neuropathy Sibling      Cancer Other          Grandparent    Diabetes Other          Grandparent    Cancer Other          Aunt        Allergies  Other, Piperacillin-tazobactam, and Vancomycin    Review of Systems   Constitutional: Negative.    HENT: Negative.     Eyes: Negative.    Respiratory: Negative.     Cardiovascular: Negative.    Gastrointestinal: Negative.    Endocrine: Negative.    Genitourinary: Negative.    Musculoskeletal: Negative.    Skin:   "Positive for wound (left breast).   Neurological: Negative.    Psychiatric/Behavioral: Negative.          Physical Exam  Constitutional:       Appearance: Normal appearance.   HENT:      Head: Normocephalic and atraumatic.      Mouth/Throat:      Mouth: Mucous membranes are moist.      Pharynx: Oropharynx is clear.   Eyes:      Extraocular Movements: Extraocular movements intact.      Conjunctiva/sclera: Conjunctivae normal.      Pupils: Pupils are equal, round, and reactive to light.   Cardiovascular:      Rate and Rhythm: Normal rate and regular rhythm.      Pulses: Normal pulses.      Heart sounds: Normal heart sounds.   Pulmonary:      Effort: Pulmonary effort is normal.      Breath sounds: Normal breath sounds.   Abdominal:      General: Bowel sounds are normal.      Palpations: Abdomen is soft.      Tenderness: There is no abdominal tenderness.   Musculoskeletal:         General: Normal range of motion.      Cervical back: Normal range of motion and neck supple.   Skin:     Capillary Refill: Capillary refill takes less than 2 seconds.      Comments: Left breast with reddened area to bottom, medial aspect breast.  Dressing over old JIL drain site. Warm to touch.    Neurological:      General: No focal deficit present.      Mental Status: She is alert and oriented to person, place, and time.   Psychiatric:         Mood and Affect: Mood normal.         Behavior: Behavior normal.          Last Recorded Vitals  Blood pressure 143/70, pulse 84, temperature 36.6 °C (97.9 °F), temperature source Oral, resp. rate 16, height 1.6 m (5' 3\"), weight 85.3 kg (188 lb 0.8 oz), SpO2 98 %.    Relevant Results  Results for orders placed or performed during the hospital encounter of 11/25/23 (from the past 24 hour(s))   CBC and Auto Differential   Result Value Ref Range    WBC 5.8 4.4 - 11.3 x10*3/uL    nRBC 0.0 0.0 - 0.0 /100 WBCs    RBC 3.58 (L) 4.00 - 5.20 x10*6/uL    Hemoglobin 11.5 (L) 12.0 - 16.0 g/dL    Hematocrit 34.9 (L) " 36.0 - 46.0 %    MCV 98 80 - 100 fL    MCH 32.1 26.0 - 34.0 pg    MCHC 33.0 32.0 - 36.0 g/dL    RDW 13.5 11.5 - 14.5 %    Platelets 100 (L) 150 - 450 x10*3/uL    Neutrophils % 62.3 40.0 - 80.0 %    Immature Granulocytes %, Automated 0.2 0.0 - 0.9 %    Lymphocytes % 25.1 13.0 - 44.0 %    Monocytes % 8.3 2.0 - 10.0 %    Eosinophils % 3.6 0.0 - 6.0 %    Basophils % 0.5 0.0 - 2.0 %    Neutrophils Absolute 3.60 1.20 - 7.70 x10*3/uL    Immature Granulocytes Absolute, Automated 0.01 0.00 - 0.70 x10*3/uL    Lymphocytes Absolute 1.45 1.20 - 4.80 x10*3/uL    Monocytes Absolute 0.48 0.10 - 1.00 x10*3/uL    Eosinophils Absolute 0.21 0.00 - 0.70 x10*3/uL    Basophils Absolute 0.03 0.00 - 0.10 x10*3/uL   Comprehensive Metabolic Panel   Result Value Ref Range    Glucose 89 65 - 99 mg/dL    Sodium 138 133 - 145 mmol/L    Potassium 3.9 3.4 - 5.1 mmol/L    Chloride 104 97 - 107 mmol/L    Bicarbonate 24 24 - 31 mmol/L    Urea Nitrogen 15 8 - 25 mg/dL    Creatinine 0.60 0.40 - 1.60 mg/dL    eGFR >90 >60 mL/min/1.73m*2    Calcium 9.7 8.5 - 10.4 mg/dL    Albumin 4.4 3.5 - 5.0 g/dL    Alkaline Phosphatase 168 (H) 35 - 125 U/L    Total Protein 8.0 (H) 5.9 - 7.9 g/dL    AST 47 (H) 5 - 40 U/L    Bilirubin, Total 0.7 0.1 - 1.2 mg/dL    ALT 30 5 - 40 U/L    Anion Gap 10 <=19 mmol/L   Blood Gas Lactic Acid, Venous   Result Value Ref Range    POCT Lactate, Venous 1.4 0.4 - 2.0 mmol/L       Assessment/Plan   Principal Problem:    Cellulitis   Resume daptomycin and cefepime    Consult Dr. Dorman    Consult Dr. Harris    CT scan of chest to rule out abscess    AM labs   Active Problems:    Benign essential hypertension   Continue home medications     Diabetes mellitus type 2 in obese (CMS/HCC)   Hold Janumet and Ozempic    AC/HS glucose with SSI coverage    Hypoglycemia protocol      Dyslipidemia   Continue statin     Cellulitis of left breast   See above.    Recurrent     History of breast cancer   S/P bilateral mastectomy with reconstruction            Cristy Jarrell, APRN-CNP

## 2023-11-26 LAB
ALBUMIN SERPL-MCNC: 4.1 G/DL (ref 3.5–5)
ALP BLD-CCNC: 159 U/L (ref 35–125)
ALT SERPL-CCNC: 27 U/L (ref 5–40)
ANION GAP SERPL CALC-SCNC: 11 MMOL/L
AST SERPL-CCNC: 40 U/L (ref 5–40)
BILIRUB SERPL-MCNC: 0.9 MG/DL (ref 0.1–1.2)
BUN SERPL-MCNC: 15 MG/DL (ref 8–25)
CALCIUM SERPL-MCNC: 9.3 MG/DL (ref 8.5–10.4)
CHLORIDE SERPL-SCNC: 105 MMOL/L (ref 97–107)
CO2 SERPL-SCNC: 24 MMOL/L (ref 24–31)
CREAT SERPL-MCNC: 0.4 MG/DL (ref 0.4–1.6)
ERYTHROCYTE [DISTWIDTH] IN BLOOD BY AUTOMATED COUNT: 13.7 % (ref 11.5–14.5)
GFR SERPL CREATININE-BSD FRML MDRD: >90 ML/MIN/1.73M*2
GLUCOSE BLD MANUAL STRIP-MCNC: 115 MG/DL (ref 74–99)
GLUCOSE BLD MANUAL STRIP-MCNC: 115 MG/DL (ref 74–99)
GLUCOSE BLD MANUAL STRIP-MCNC: 129 MG/DL (ref 74–99)
GLUCOSE BLD MANUAL STRIP-MCNC: 135 MG/DL (ref 74–99)
GLUCOSE SERPL-MCNC: 121 MG/DL (ref 65–99)
HCT VFR BLD AUTO: 35.1 % (ref 36–46)
HGB BLD-MCNC: 11.4 G/DL (ref 12–16)
MCH RBC QN AUTO: 31.7 PG (ref 26–34)
MCHC RBC AUTO-ENTMCNC: 32.5 G/DL (ref 32–36)
MCV RBC AUTO: 98 FL (ref 80–100)
NRBC BLD-RTO: 0 /100 WBCS (ref 0–0)
PLATELET # BLD AUTO: 97 X10*3/UL (ref 150–450)
POTASSIUM SERPL-SCNC: 3.7 MMOL/L (ref 3.4–5.1)
PROT SERPL-MCNC: 7.4 G/DL (ref 5.9–7.9)
RBC # BLD AUTO: 3.6 X10*6/UL (ref 4–5.2)
SODIUM SERPL-SCNC: 140 MMOL/L (ref 133–145)
WBC # BLD AUTO: 5.3 X10*3/UL (ref 4.4–11.3)

## 2023-11-26 PROCEDURE — 80053 COMPREHEN METABOLIC PANEL: CPT | Performed by: NURSE PRACTITIONER

## 2023-11-26 PROCEDURE — 82947 ASSAY GLUCOSE BLOOD QUANT: CPT

## 2023-11-26 PROCEDURE — 36415 COLL VENOUS BLD VENIPUNCTURE: CPT | Performed by: NURSE PRACTITIONER

## 2023-11-26 PROCEDURE — 2500000001 HC RX 250 WO HCPCS SELF ADMINISTERED DRUGS (ALT 637 FOR MEDICARE OP): Performed by: NURSE PRACTITIONER

## 2023-11-26 PROCEDURE — 2500000004 HC RX 250 GENERAL PHARMACY W/ HCPCS (ALT 636 FOR OP/ED): Mod: JZ | Performed by: NURSE PRACTITIONER

## 2023-11-26 PROCEDURE — 1100000001 HC PRIVATE ROOM DAILY

## 2023-11-26 PROCEDURE — 85027 COMPLETE CBC AUTOMATED: CPT | Performed by: NURSE PRACTITIONER

## 2023-11-26 PROCEDURE — 94760 N-INVAS EAR/PLS OXIMETRY 1: CPT

## 2023-11-26 PROCEDURE — 2500000001 HC RX 250 WO HCPCS SELF ADMINISTERED DRUGS (ALT 637 FOR MEDICARE OP)

## 2023-11-26 RX ORDER — DEXTROSE, SODIUM CHLORIDE, SODIUM LACTATE, POTASSIUM CHLORIDE, AND CALCIUM CHLORIDE 5; .6; .31; .03; .02 G/100ML; G/100ML; G/100ML; G/100ML; G/100ML
100 INJECTION, SOLUTION INTRAVENOUS CONTINUOUS
Status: CANCELLED | OUTPATIENT
Start: 2023-11-26

## 2023-11-26 RX ORDER — CHLORHEXIDINE GLUCONATE 40 MG/ML
1 SOLUTION TOPICAL ONCE
Status: CANCELLED | OUTPATIENT
Start: 2023-11-26 | End: 2023-11-26

## 2023-11-26 RX ORDER — ONDANSETRON 4 MG/1
4 TABLET, ORALLY DISINTEGRATING ORAL ONCE
Status: CANCELLED | OUTPATIENT
Start: 2023-11-26 | End: 2023-11-26

## 2023-11-26 RX ORDER — CELECOXIB 200 MG/1
400 CAPSULE ORAL ONCE
Status: CANCELLED | OUTPATIENT
Start: 2023-11-26 | End: 2023-11-26

## 2023-11-26 RX ORDER — ACETAMINOPHEN 325 MG/1
650 TABLET ORAL ONCE
Status: CANCELLED | OUTPATIENT
Start: 2023-11-26 | End: 2023-11-26

## 2023-11-26 RX ADMIN — Medication 38 MG OF IRON: at 18:22

## 2023-11-26 RX ADMIN — CEFEPIME 2 G: 2 INJECTION, POWDER, FOR SOLUTION INTRAVENOUS at 09:51

## 2023-11-26 RX ADMIN — METOPROLOL SUCCINATE 50 MG: 50 TABLET, EXTENDED RELEASE ORAL at 09:52

## 2023-11-26 RX ADMIN — GABAPENTIN 400 MG: 400 CAPSULE ORAL at 15:39

## 2023-11-26 RX ADMIN — GABAPENTIN 400 MG: 400 CAPSULE ORAL at 20:34

## 2023-11-26 RX ADMIN — SERTRALINE 100 MG: 100 TABLET, FILM COATED ORAL at 20:33

## 2023-11-26 RX ADMIN — OXYCODONE HYDROCHLORIDE AND ACETAMINOPHEN 250 MG: 500 TABLET ORAL at 20:33

## 2023-11-26 RX ADMIN — GABAPENTIN 400 MG: 400 CAPSULE ORAL at 09:52

## 2023-11-26 RX ADMIN — LOSARTAN POTASSIUM 50 MG: 50 TABLET, FILM COATED ORAL at 10:10

## 2023-11-26 RX ADMIN — SERTRALINE 100 MG: 100 TABLET, FILM COATED ORAL at 09:52

## 2023-11-26 RX ADMIN — DAPTOMYCIN IN SODIUM CHLORIDE 500 MG: 500 INJECTION, SOLUTION INTRAVENOUS at 13:59

## 2023-11-26 RX ADMIN — OXYCODONE HYDROCHLORIDE AND ACETAMINOPHEN 250 MG: 500 TABLET ORAL at 09:52

## 2023-11-26 RX ADMIN — AMLODIPINE BESYLATE 10 MG: 10 TABLET ORAL at 09:52

## 2023-11-26 RX ADMIN — Medication 38 MG OF IRON: at 09:51

## 2023-11-26 RX ADMIN — CEFEPIME 2 G: 2 INJECTION, POWDER, FOR SOLUTION INTRAVENOUS at 15:39

## 2023-11-26 RX ADMIN — ATORVASTATIN CALCIUM 20 MG: 20 TABLET, FILM COATED ORAL at 20:34

## 2023-11-26 RX ADMIN — HYDROCHLOROTHIAZIDE 12.5 MG: 12.5 CAPSULE ORAL at 10:10

## 2023-11-26 ASSESSMENT — ENCOUNTER SYMPTOMS
PSYCHIATRIC NEGATIVE: 1
MUSCULOSKELETAL NEGATIVE: 1
RESPIRATORY NEGATIVE: 1
CARDIOVASCULAR NEGATIVE: 1
GASTROINTESTINAL NEGATIVE: 1
EYES NEGATIVE: 1
CONSTITUTIONAL NEGATIVE: 1
ENDOCRINE NEGATIVE: 1
NEUROLOGICAL NEGATIVE: 1

## 2023-11-26 ASSESSMENT — PAIN SCALES - GENERAL
PAINLEVEL_OUTOF10: 0 - NO PAIN
PAINLEVEL_OUTOF10: 0 - NO PAIN

## 2023-11-26 ASSESSMENT — COGNITIVE AND FUNCTIONAL STATUS - GENERAL
MOBILITY SCORE: 24
MOBILITY SCORE: 24
DAILY ACTIVITIY SCORE: 24
DAILY ACTIVITIY SCORE: 24

## 2023-11-26 NOTE — CARE PLAN
The patient's goals for the shift include      The clinical goals for the shift include patient will tolerate antibiotics      Problem: Diabetes  Goal: Achieve decreasing blood glucose levels by end of shift  Outcome: Progressing  Goal: Increase stability of blood glucose readings by end of shift  Outcome: Progressing  Goal: Decrease in ketones present in urine by end of shift  Outcome: Progressing  Goal: Maintain electrolyte levels within acceptable range throughout shift  Outcome: Progressing  Goal: Maintain glucose levels >70mg/dl to <250mg/dl throughout shift  Outcome: Progressing  Goal: No changes in neurological exam by end of shift  Outcome: Progressing  Goal: Learn about and adhere to nutrition recommendations by end of shift  Outcome: Progressing  Goal: Vital signs within normal range for age by end of shift  Outcome: Progressing  Goal: Increase self care and/or family involovement by end of shift  Outcome: Progressing  Goal: Receive DSME education by end of shift  Outcome: Progressing     Problem: Skin  Goal: Decreased wound size/increased tissue granulation at next dressing change  Outcome: Progressing  Goal: Participates in plan/prevention/treatment measures  Outcome: Progressing  Goal: Prevent/manage excess moisture  Outcome: Progressing  Goal: Prevent/minimize sheer/friction injuries  Outcome: Progressing  Goal: Promote/optimize nutrition  Outcome: Progressing  Goal: Promote skin healing  Outcome: Progressing

## 2023-11-26 NOTE — NURSING NOTE
Assumed care of patient, patient resting in bed conversing with me with bedside shift report, no c/o voiced, vs stable.

## 2023-11-26 NOTE — CONSULTS
Inpatient consult to Infectious Diseases  Consult performed by: Angelica Philippe, APRN-CNP  Consult ordered by: Robbie Hernandez DO  Reason for consult: left breast cellulitis          Primary MD: Trell Pierre MD        History Of Present Illness  Sabrina Michaud is a 58 y.o. female with past medical history of diabetes mellitus, hypertension, breast cancer status post bilateral mastectomy.  She had subsequent surgeries due to infection.  She was on IV cefepime, daptomycin last dose 11/15/2023.  She reported symptoms started about 9 days after discontinuation of antibiotics.  She reports left breast erythema, swelling, tenderness, small amount of drainage.  She reports she was started on Levaquin on 11/22/2023 but symptoms worsened.  She denies fever chills sweats nausea vomiting or diarrhea.  She denies shortness of breath cough or chest pain.  Labs with no leukocytosis.  Blood cultures pending.  Previous cultures with Pseudomonas, Corynebacterium.  She is on empiric IV cefepime, daptomycin.       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    Surgical History  She has a past surgical history that includes Hysterectomy (05/31/2016); Tonsillectomy (05/31/2016); Mastectomy (Bilateral); Breast surgery (Bilateral); and Breast surgery (Left).     Social History     Occupational History    Not on file   Tobacco Use    Smoking status: Never    Smokeless tobacco: Never   Substance and Sexual Activity    Alcohol use: Not Currently     Alcohol/week: 2.0 - 3.0 standard drinks of alcohol     Types: 2 - 3 Glasses of wine per week     Comment: none since cancer diagnosis    Drug use: Never    Sexual activity: Not on file     Travel History   Travel since 10/26/23    No documented travel since 10/26/23          Family History  Family History   Problem Relation Name  Age of Onset    Diabetes Mother      Cancer Father      Ovarian cancer Sister      Diabetes Sibling      Neuropathy Sibling      Cancer Other          Grandparent    Diabetes Other          Grandparent    Cancer Other          Aunt     Allergies  Other, Piperacillin-tazobactam, and Vancomycin     Immunization History   Administered Date(s) Administered    Influenza, seasonal, injectable 02/11/2023    Pfizer Purple Cap SARS-CoV-2 04/30/2021, 05/31/2021, 12/01/2021    Pneumococcal, Unspecified 02/11/2023    Zoster vaccine, recombinant, adult (SHINGRIX) 01/01/2019, 07/24/2019     Medications  Home medications:  Medications Prior to Admission   Medication Sig Dispense Refill Last Dose    amLODIPine (Norvasc) 10 mg tablet Take 1 tablet (10 mg) by mouth once daily. 90 tablet 1 11/25/2023 at 0800    ascorbic acid (Vitamin C) 250 mg tablet Take 1 tablet (250 mg) by mouth 2 times a day.   11/25/2023 at 0800    atorvastatin (Lipitor) 20 mg tablet Take 1 tablet (20 mg) by mouth once daily. 90 tablet 1 11/25/2023 at 0800    ferrous gluconate 324 (38 Fe) MG tablet TAKE 1 TABLET IN THE MORNING AND 1 TABLET BEFORE BEDTIME 180 tablet 1 11/25/2023 at 0800    gabapentin (Neurontin) 400 mg capsule Take 1 capsule (400 mg) by mouth 3 times a day. 270 capsule 0 11/25/2023 at 0800    losartan-hydrochlorothiazide (Hyzaar) 50-12.5 mg tablet Take 1 tablet by mouth once daily. 90 tablet 1 11/25/2023 at 0800    metoprolol succinate XL (Toprol-XL) 50 mg 24 hr tablet Take 1 tablet (50 mg) by mouth once daily. Entered during abstraction- No further refills until seen in office 90 tablet 1 11/25/2023 at 0800    ondansetron (Zofran) 8 mg tablet Take 1 tablet (8 mg) by mouth every 8 hours if needed for nausea or vomiting. 20 tablet 0 Unknown    Ozempic 0.25 mg or 0.5 mg (2 mg/3 mL) pen injector INJECT 0.25 MG UNDER THE SKIN ONCE WEEKLY FOR 1 MONTH, THEN INCREASE TO 0.5MG WEEKLY 3 mL 0 Unknown    potassium chloride CR (K-Tab) 20 mEq ER tablet Take 1  "tablet (20 mEq) by mouth 2 times a day. 180 tablet 1 11/25/2023 at 0800    sertraline (Zoloft) 100 mg tablet Take 1 tablet (100 mg) by mouth 2 times a day. 180 tablet 1 11/25/2023 at 0800    SITagliptin phos-metformin (Janumet)  mg tablet Take 1 tablet by mouth 2 times a day with meals. 180 tablet 1 11/25/2023 at 0800     Current medications:  Scheduled medications  amLODIPine, 10 mg, oral, Daily  ascorbic acid, 250 mg, oral, BID  atorvastatin, 20 mg, oral, Nightly  cefepime, 2 g, intravenous, q8h  daptomycin, 8 mg/kg (Adjusted), intravenous, q24h JM  [Held by provider] enoxaparin, 40 mg, subcutaneous, q24h  ferrous gluconate, 38 mg of iron, oral, BID with meals  gabapentin, 400 mg, oral, TID  losartan, 50 mg, oral, Daily   And  hydroCHLOROthiazide, 12.5 mg, oral, Daily  insulin lispro, 0-5 Units, subcutaneous, TID with meals  metoprolol succinate XL, 50 mg, oral, Daily  sertraline, 100 mg, oral, BID  [Held by provider] sitaGLIPtin phos-metformin, 1 tablet, oral, BID with meals      Continuous medications     PRN medications  PRN medications: acetaminophen, acetaminophen, benzocaine-menthol, dextromethorphan-guaifenesin, dextrose 10 % in water (D10W), dextrose, glucagon, polyethylene glycol    Review of Systems   Constitutional: Negative.    HENT: Negative.     Eyes: Negative.    Respiratory: Negative.     Cardiovascular: Negative.    Gastrointestinal: Negative.    Endocrine: Negative.    Genitourinary: Negative.    Musculoskeletal: Negative.    Skin: Negative.         Left breast erythema, tenderness, small amount of drainage   Neurological: Negative.    Psychiatric/Behavioral: Negative.          Objective  Range of Vitals (last 24 hours)  Heart Rate:  [73-86]   Temp:  [36.6 °C (97.9 °F)-36.7 °C (98.1 °F)]   Resp:  [16-20]   BP: (134-150)/(62-72)   Height:  [160 cm (5' 2.99\")-160 cm (5' 3\")]   Weight:  [85.3 kg (188 lb 0.8 oz)]   SpO2:  [97 %-100 %]   Daily Weight  11/25/23 : 85.3 kg (188 lb 0.8 oz)    Body " mass index is 33.32 kg/m².     Physical Exam  Constitutional:       Appearance: Normal appearance.   HENT:      Head: Normocephalic and atraumatic.      Nose: Nose normal.      Mouth/Throat:      Mouth: Mucous membranes are moist.      Pharynx: Oropharynx is clear.   Eyes:      Extraocular Movements: Extraocular movements intact.      Conjunctiva/sclera: Conjunctivae normal.   Cardiovascular:      Rate and Rhythm: Normal rate and regular rhythm.   Pulmonary:      Effort: Pulmonary effort is normal.      Breath sounds: Normal breath sounds.   Abdominal:      General: Bowel sounds are normal.      Palpations: Abdomen is soft.   Musculoskeletal:         General: Normal range of motion.      Cervical back: Normal range of motion and neck supple.   Skin:     Comments: Pictures reviewed-  Left breast erythema, tenderness, small amount of drainage    Neurological:      General: No focal deficit present.      Mental Status: She is alert and oriented to person, place, and time. Mental status is at baseline.   Psychiatric:         Mood and Affect: Mood normal.         Behavior: Behavior normal.          Relevant Results  Outside Hospital Results    Labs  Results from last 72 hours   Lab Units 11/26/23 0423 11/25/23  1446   WBC AUTO x10*3/uL 5.3 5.8   HEMOGLOBIN g/dL 11.4* 11.5*   HEMATOCRIT % 35.1* 34.9*   PLATELETS AUTO x10*3/uL 97* 100*   NEUTROS PCT AUTO %  --  62.3   LYMPHS PCT AUTO %  --  25.1   MONOS PCT AUTO %  --  8.3   EOS PCT AUTO %  --  3.6     Results from last 72 hours   Lab Units 11/26/23 0423 11/25/23  1446   SODIUM mmol/L 140 138   POTASSIUM mmol/L 3.7 3.9   CHLORIDE mmol/L 105 104   CO2 mmol/L 24 24   BUN mg/dL 15 15   CREATININE mg/dL 0.40 0.60   GLUCOSE mg/dL 121* 89   CALCIUM mg/dL 9.3 9.7   ANION GAP mmol/L 11 10   EGFR mL/min/1.73m*2 >90 >90     Results from last 72 hours   Lab Units 11/26/23 0423 11/25/23  1446   ALK PHOS U/L 159* 168*   BILIRUBIN TOTAL mg/dL 0.9 0.7   PROTEIN TOTAL g/dL 7.4 8.0*  "  ALT U/L 27 30   AST U/L 40 47*   ALBUMIN g/dL 4.1 4.4     Estimated Creatinine Clearance: 125 mL/min (by C-G formula based on SCr of 0.4 mg/dL).  CRP   Date Value Ref Range Status   06/01/2022 1.02 (A) mg/dL Final     Comment:     REF VALUE  < 1.00     05/31/2022 0.77 mg/dL Final     Comment:     REF VALUE  < 1.00     05/20/2021 1.21 (A) mg/dL Final     Comment:     REF VALUE  < 1.00       Sedimentation Rate   Date Value Ref Range Status   06/01/2022 11 0 - 30 mm/h Final     Comment:     Please note new reference ranges as of 5/9/2022.   05/31/2022 18 0 - 30 mm/h Final     Comment:     Please note new reference ranges as of 5/9/2022.   05/20/2021 54 (H) 0 - 30 mm/h Final     Comment:      Note new reference range (males age 17-50) and new    methodology as of 03/04/2021.       No results found for: \"HIV1X2\", \"HIVCONF\", \"KXXKGN3PV\"  Hepatitis C Ab   Date Value Ref Range Status   05/22/2021 NONREACTIVE NONREACTIVE Final     Comment:      Results from patients taking biotin supplements or receiving   high-dose biotin therapy should be interpreted with caution   due to possible interference with this test. Providers may    contact their local laboratory for further information.       HCV PCR Quant   Date Value Ref Range Status   05/24/2021 NOT DETECTED IU/mL Final     Comment:     REF VALUE  NEGATIVE       Microbiology  Susceptibility data from last 90 days.  Collected Specimen Info Organism Aztreonam Cefepime Ceftazidime Ceftriaxone Ciprofloxacin Gentamicin Levofloxacin Penicillin Piperacillin/Tazobactam Tetracycline Tobramycin Vancomycin   11/01/23 Fluid from BREAST CYST FLUID LEFT Mixed Anaerobic Bacteria               10/31/23 Tissue/Biopsy from Surgical Site Infection Pseudomonas aeruginosa S S S  S S S  S  S      Corynebacterium striatum group    R R R  R  R  S       Imaging  CT chest w IV contrast    Result Date: 11/25/2023  Interpreted By:  Walker Figueroa, STUDY: CT CHEST W IV CONTRAST; 11/25/2023 7:25 pm   " INDICATION: Signs/Symptoms:abscess. Redness and warmth left breast. Patient has had 2 prior surgeries for infection previous mastectomy, reconstruction site.   COMPARISON: 08/17/2022   ACCESSION NUMBER(S): [id number]   ORDERING CLINICIAN: YANNA CHAMORRO   TECHNIQUE: The examination was performed with the intravenous injection of 75 ml of non-ionic iodinated contrast was injected intravenously.   A helical acquisition data was obtained with sagittal coronal reconstructions performed.   One or more of the following dose reduction techniques were used: Automated exposure control Adjustment of the mA and/or kV according to patient size, and/or use of iterative reconstruction technique.   FINDINGS: There is no hilar or mediastinal lymphadenopathy present.  No infiltrates or effusions are identified.   Chest Wall: There are bilateral breast implants present. There is no evidence for a chest wall abscess. There is a hazy appearance of the fat surrounding the implants bilaterally, nonspecific in appearance. It is unclear whether these findings are postsurgical in nature or whether the air indicative of underlying infection. [place]-implant haziness is slightly more pronounced on the left compared to the right, particularly inferiorly and medially.   There is a 2.2 cm rounded lesion with fluid attenuation within the left axilla possibly representing a small residual postoperative seroma/hematoma from axillary lymph node resection. Abscess is not entirely excluded although there is no surrounding haziness of the adjacent fat making this somewhat less likely.   Upper Abdominal Findings: There is a nodular appearance of the hepatic contour consistent with cirrhosis. There is recanalization of the umbilical vein consistent with portal venous hypertension. Splenomegaly is also partially included within the field of view, consistent with portal venous hypertension. There are few too small to characterize hypodense foci within the  right and left lobes of the liver most likely representing cysts with the largest measuring 1.4 cm within the left lobe. These are unchanged compared to the prior study from 08/17/2022.   Skeletal System: No fractures or destructive lesions are identified.       1. There is a small, 2.2 cm rounded area of fluid attenuation within the left axilla possibly representing a small hematoma/seroma from axillary lymph node resection. 2. Bilateral breast implants are present with haziness of the surrounding fat with the haziness slightly more pronounced surrounding the left breast implant. A discrete abscess is not identified adjacent to either implant. 3. Cirrhosis with portal venous hypertension and splenomegaly as noted above.   MACRO: none   Signed by: Walker Figueroa 11/25/2023 8:04 PM Dictation workstation:   [id number]    ECG 12 lead    Result Date: 10/31/2023  Normal sinus rhythm Normal ECG When compared with ECG of 17-AUG-2022 08:45, Previous ECG has undetermined rhythm, needs review QT has shortened Confirmed by Herson Quiroga ([id number]) on 10/31/2023 4:04:56 PM    BI US breast limited left    Result Date: 10/31/2023  Interpreted By:  Lucien Martínez, STUDY: BI US BREAST LIMITED LEFT; 10/31/2023 1:58 pm   INDICATION: Signs/Symptoms:possible infection. Rule out abscess.   COMPARISON: Mammograms from May and June 2023   ACCESSION NUMBER(S): [id number]   ORDERING CLINICIAN: NEDRA DING   TECHNIQUE: Limited real-time grayscale and color Doppler imaging of the  breast was performed.   FINDINGS: Patient has previous mastectomy and reconstruction surgery. Elongated fluid collection is seen anteriorly to the left breast implant, measuring 6.4 by 0.5 by 3.7 cm. On the Doppler images there is no associated surrounding hyperemia.       Elongated fluid collection adjacent to the left breast implant, without associated surrounding hyperemia, most likely postsurgical seroma. No definite drainable abscess visualized.   ACR  BI-RADS 2:  Benign finding.   Correlate clinically and follow-up as needed.   MACRO: None.   Signed by: Lucien Martínez 10/31/2023 2:16 PM Dictation workstation:   [id number]     Assessment/Plan   Left breast cellulitis  Left breast prosthetic implant- infected  Diabetes Mellitus  Previous culture with pseudomonas, Corynebacterium      IV Cefepime  IV daptomycin  Follow up culture  Follow up intraoperative findings  Monitor temp and WBC  Local care  Plastic surgery plan OR on 11/27  Further recommendation based on culture/intraoperative findings      Angelica Philippe, APRN-CNP

## 2023-11-26 NOTE — CONSULTS
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 female presenting with left breast erythema after course of IV antibiotics and washout.  She returned a few days ago and was replaced back on Levaquin as an outpatient.  She called yesterday and was referred to the emergency room for IV antibiotics after she noted left breast erythema and increased warmth of her left breast implant reconstruction.  No purulence, no chills.  No increase in sugar measurements of her type 2 diabetes.     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    Surgical History  She has a past surgical history that includes Hysterectomy (05/31/2016); Tonsillectomy (05/31/2016); Mastectomy (Bilateral); Breast surgery (Bilateral); and Breast surgery (Left).     Social History  She reports that she has never smoked. She has never used smokeless tobacco. She reports that she does not currently use alcohol after a past usage of about 2.0 - 3.0 standard drinks of alcohol per week. She reports that she does not use drugs.    Family History  Family History   Problem Relation Name Age of Onset    Diabetes Mother      Cancer Father      Ovarian cancer Sister      Diabetes Sibling      Neuropathy Sibling      Cancer Other          Grandparent    Diabetes Other          Grandparent    Cancer Other          Aunt        Allergies  Other, Piperacillin-tazobactam, and Vancomycin    11 system review was negative     Physical Exam    Lungs are clear.  Abdomen is benign.  Right breast implant reconstruction has got normal morphology, no cellulitis erythema peau d'orange.  Left nipple areolar complex resection site has a small point of unhealed skin.  Patient is doing  "Xeroform dressing here.  Medial left breast has erythema with no peau d'orange.  No tenderness or fluctuance.  She states this is improved in erythema since admitted for IV antibiotics yesterday.  Last Recorded Vitals  Blood pressure 136/67, pulse 74, temperature 36.7 °C (98.1 °F), temperature source Oral, resp. rate 18, height 1.6 m (5' 2.99\"), weight 85.3 kg (188 lb 0.8 oz), SpO2 98 %.    Relevant Results  Lab Results   Component Value Date    WBC 5.3 11/26/2023    HGB 11.4 (L) 11/26/2023    HCT 35.1 (L) 11/26/2023    MCV 98 11/26/2023    PLT 97 (L) 11/26/2023    Susceptibility data from last 90 days.  Collected Specimen Info Organism Aztreonam Cefepime Ceftazidime Ceftriaxone Ciprofloxacin Gentamicin Levofloxacin Penicillin Piperacillin/Tazobactam Tetracycline Tobramycin Vancomycin   11/01/23 Fluid from BREAST CYST FLUID LEFT Mixed Anaerobic Bacteria               10/31/23 Tissue/Biopsy from Surgical Site Infection Pseudomonas aeruginosa S S S  S S S  S  S      Corynebacterium striatum group    R R R  R  R  S      Results from last 7 days   Lab Units 11/26/23  0423   SODIUM mmol/L 140   POTASSIUM mmol/L 3.7   CHLORIDE mmol/L 105   CO2 mmol/L 24   BUN mg/dL 15   CREATININE mg/dL 0.40   CALCIUM mg/dL 9.3   PROTEIN TOTAL g/dL 7.4   BILIRUBIN TOTAL mg/dL 0.9   ALK PHOS U/L 159*   ALT U/L 27   AST U/L 40   GLUCOSE mg/dL 121*         Assessment/Plan     Patient has recurrent left breast cellulitis failing outpatient management.  She is already undergone washout explant and replacement of implant with drain sequence and postoperative IV antibiotics.  She is not clearing this infection.  Her intercurrent comorbidities may be contributing to her recurrent cellulitis.  I explained biofilm to her and her spouse in the past.  It is most likely a manifestation of her biofilm and implant cellulitis that cannot be cleared by her immune system antibiotics.  This most likely necessitates explant and removal of acellular dermal " matrix.  I will add the patient onto tomorrow's surgical schedule.  She consents to the procedure.  She knows the way back is a few weeks hiatus of IV antibiotics and then return to the OR for direct to implant reconstruction.  Patient will be n.p.o. after 0800.  She will receive ERA S protocol medications tomorrow.  ID consult will confer for duration of postoperative antibiotics.  I will notify the OR desk of my desire for adding the patient on.  Working on epic navigation throughout the day.    I spent 22 minutes in the professional and overall care of this patient.      Tim Dorman MD

## 2023-11-26 NOTE — PROGRESS NOTES
Sabrina Michaud is a 58 y.o. female on day 1 of admission presenting with Cellulitis.      Subjective   Admitted last pm for left breast implant cellulitis/infection. Pt feels erythema is improved. She is tolerating IV Abx. Afebrile.        Objective     Last Recorded Vitals  /67 (BP Location: Left arm, Patient Position: Lying)   Pulse 74   Temp 36.7 °C (98.1 °F) (Oral)   Resp 18   Wt 85.3 kg (188 lb 0.8 oz)   SpO2 98%   Intake/Output last 3 Shifts:    Intake/Output Summary (Last 24 hours) at 11/26/2023 0961  Last data filed at 11/25/2023 1544  Gross per 24 hour   Intake 150 ml   Output --   Net 150 ml         Physical Exam  HENT:      Head: Normocephalic and atraumatic.      Nose: Nose normal.      Mouth/Throat:      Mouth: Mucous membranes are moist.      Pharynx: Oropharynx is clear.   Eyes:      Extraocular Movements: Extraocular movements intact.      Pupils: Pupils are equal, round, and reactive to light.   Cardiovascular:      Rate and Rhythm: Normal rate and regular rhythm.      Pulses: Normal pulses.   Pulmonary:      Effort: Pulmonary effort is normal.      Breath sounds: Normal breath sounds.   Abdominal:      General: Abdomen is flat. Bowel sounds are normal.      Palpations: Abdomen is soft.   Musculoskeletal:         General: Normal range of motion.   Skin:     General: Skin is warm and dry.      Capillary Refill: Capillary refill takes less than 2 seconds.      Comments: Left breast erythema. No drainage from incision.   Neurological:      General: No focal deficit present.      Mental Status: She is oriented to person, place, and time.   Psychiatric:         Mood and Affect: Mood normal.       Results for orders placed or performed during the hospital encounter of 11/25/23 (from the past 24 hour(s))   CBC and Auto Differential   Result Value Ref Range    WBC 5.8 4.4 - 11.3 x10*3/uL    nRBC 0.0 0.0 - 0.0 /100 WBCs    RBC 3.58 (L) 4.00 - 5.20 x10*6/uL    Hemoglobin 11.5 (L) 12.0 - 16.0 g/dL     Hematocrit 34.9 (L) 36.0 - 46.0 %    MCV 98 80 - 100 fL    MCH 32.1 26.0 - 34.0 pg    MCHC 33.0 32.0 - 36.0 g/dL    RDW 13.5 11.5 - 14.5 %    Platelets 100 (L) 150 - 450 x10*3/uL    Neutrophils % 62.3 40.0 - 80.0 %    Immature Granulocytes %, Automated 0.2 0.0 - 0.9 %    Lymphocytes % 25.1 13.0 - 44.0 %    Monocytes % 8.3 2.0 - 10.0 %    Eosinophils % 3.6 0.0 - 6.0 %    Basophils % 0.5 0.0 - 2.0 %    Neutrophils Absolute 3.60 1.20 - 7.70 x10*3/uL    Immature Granulocytes Absolute, Automated 0.01 0.00 - 0.70 x10*3/uL    Lymphocytes Absolute 1.45 1.20 - 4.80 x10*3/uL    Monocytes Absolute 0.48 0.10 - 1.00 x10*3/uL    Eosinophils Absolute 0.21 0.00 - 0.70 x10*3/uL    Basophils Absolute 0.03 0.00 - 0.10 x10*3/uL   Comprehensive Metabolic Panel   Result Value Ref Range    Glucose 89 65 - 99 mg/dL    Sodium 138 133 - 145 mmol/L    Potassium 3.9 3.4 - 5.1 mmol/L    Chloride 104 97 - 107 mmol/L    Bicarbonate 24 24 - 31 mmol/L    Urea Nitrogen 15 8 - 25 mg/dL    Creatinine 0.60 0.40 - 1.60 mg/dL    eGFR >90 >60 mL/min/1.73m*2    Calcium 9.7 8.5 - 10.4 mg/dL    Albumin 4.4 3.5 - 5.0 g/dL    Alkaline Phosphatase 168 (H) 35 - 125 U/L    Total Protein 8.0 (H) 5.9 - 7.9 g/dL    AST 47 (H) 5 - 40 U/L    Bilirubin, Total 0.7 0.1 - 1.2 mg/dL    ALT 30 5 - 40 U/L    Anion Gap 10 <=19 mmol/L   Blood Gas Lactic Acid, Venous   Result Value Ref Range    POCT Lactate, Venous 1.4 0.4 - 2.0 mmol/L   POCT GLUCOSE   Result Value Ref Range    POCT Glucose 81 74 - 99 mg/dL   POCT GLUCOSE   Result Value Ref Range    POCT Glucose 163 (H) 74 - 99 mg/dL   CBC   Result Value Ref Range    WBC 5.3 4.4 - 11.3 x10*3/uL    nRBC 0.0 0.0 - 0.0 /100 WBCs    RBC 3.60 (L) 4.00 - 5.20 x10*6/uL    Hemoglobin 11.4 (L) 12.0 - 16.0 g/dL    Hematocrit 35.1 (L) 36.0 - 46.0 %    MCV 98 80 - 100 fL    MCH 31.7 26.0 - 34.0 pg    MCHC 32.5 32.0 - 36.0 g/dL    RDW 13.7 11.5 - 14.5 %    Platelets 97 (L) 150 - 450 x10*3/uL   Comprehensive metabolic panel   Result Value  Ref Range    Glucose 121 (H) 65 - 99 mg/dL    Sodium 140 133 - 145 mmol/L    Potassium 3.7 3.4 - 5.1 mmol/L    Chloride 105 97 - 107 mmol/L    Bicarbonate 24 24 - 31 mmol/L    Urea Nitrogen 15 8 - 25 mg/dL    Creatinine 0.40 0.40 - 1.60 mg/dL    eGFR >90 >60 mL/min/1.73m*2    Calcium 9.3 8.5 - 10.4 mg/dL    Albumin 4.1 3.5 - 5.0 g/dL    Alkaline Phosphatase 159 (H) 35 - 125 U/L    Total Protein 7.4 5.9 - 7.9 g/dL    AST 40 5 - 40 U/L    Bilirubin, Total 0.9 0.1 - 1.2 mg/dL    ALT 27 5 - 40 U/L    Anion Gap 11 <=19 mmol/L   POCT GLUCOSE   Result Value Ref Range    POCT Glucose 129 (H) 74 - 99 mg/dL     Assessment/Plan   Cellulitis/infection left breast implant  -History of breast cancer s/p bilateral mastectomy with reconstruction   -Dr. Dorman follows. Pt to go to OR tomorrow for implant removal  -Continue IV Abx  -ID to see              -CT shows 2.2 cm area of fluid retention left axilla, possibly small hematoma/seroma. No abscess identified               Benign essential hypertension  -Continue home medications   -BP stable, monitor    Diabetes mellitus   -Holding Janumet and Ozempic   -AC/HS glucose with SSI coverage   -Hypoglycemia protocol                 Dyslipidemia  -Continue statin       Plan  To Surgery Monday for implant removal and washout. To home on discharge with no needs          SNOW Murdock-CNP

## 2023-11-27 ENCOUNTER — ANESTHESIA EVENT (OUTPATIENT)
Dept: OPERATING ROOM | Facility: HOSPITAL | Age: 58
DRG: 908 | End: 2023-11-27
Payer: COMMERCIAL

## 2023-11-27 ENCOUNTER — ANESTHESIA (OUTPATIENT)
Dept: OPERATING ROOM | Facility: HOSPITAL | Age: 58
DRG: 908 | End: 2023-11-27
Payer: COMMERCIAL

## 2023-11-27 ENCOUNTER — PHARMACY VISIT (OUTPATIENT)
Dept: PHARMACY | Facility: CLINIC | Age: 58
End: 2023-11-27
Payer: COMMERCIAL

## 2023-11-27 ENCOUNTER — APPOINTMENT (OUTPATIENT)
Dept: WOUND CARE | Facility: HOSPITAL | Age: 58
End: 2023-11-27
Payer: COMMERCIAL

## 2023-11-27 LAB
ANION GAP SERPL CALC-SCNC: 11 MMOL/L
BUN SERPL-MCNC: 15 MG/DL (ref 8–25)
CALCIUM SERPL-MCNC: 9.5 MG/DL (ref 8.5–10.4)
CHLORIDE SERPL-SCNC: 105 MMOL/L (ref 97–107)
CK SERPL-CCNC: 35 U/L (ref 24–195)
CO2 SERPL-SCNC: 23 MMOL/L (ref 24–31)
CREAT SERPL-MCNC: 0.5 MG/DL (ref 0.4–1.6)
ERYTHROCYTE [DISTWIDTH] IN BLOOD BY AUTOMATED COUNT: 13.6 % (ref 11.5–14.5)
GFR SERPL CREATININE-BSD FRML MDRD: >90 ML/MIN/1.73M*2
GLUCOSE BLD MANUAL STRIP-MCNC: 121 MG/DL (ref 74–99)
GLUCOSE BLD MANUAL STRIP-MCNC: 131 MG/DL (ref 74–99)
GLUCOSE BLD MANUAL STRIP-MCNC: 96 MG/DL (ref 74–99)
GLUCOSE SERPL-MCNC: 120 MG/DL (ref 65–99)
HCT VFR BLD AUTO: 36.3 % (ref 36–46)
HGB BLD-MCNC: 11.6 G/DL (ref 12–16)
MCH RBC QN AUTO: 31.7 PG (ref 26–34)
MCHC RBC AUTO-ENTMCNC: 32 G/DL (ref 32–36)
MCV RBC AUTO: 99 FL (ref 80–100)
NRBC BLD-RTO: 0 /100 WBCS (ref 0–0)
PLATELET # BLD AUTO: 103 X10*3/UL (ref 150–450)
POTASSIUM SERPL-SCNC: 3.5 MMOL/L (ref 3.4–5.1)
RBC # BLD AUTO: 3.66 X10*6/UL (ref 4–5.2)
SODIUM SERPL-SCNC: 139 MMOL/L (ref 133–145)
WBC # BLD AUTO: 5.4 X10*3/UL (ref 4.4–11.3)

## 2023-11-27 PROCEDURE — 0HPU0JZ REMOVAL OF SYNTHETIC SUBSTITUTE FROM LEFT BREAST, OPEN APPROACH: ICD-10-PCS | Performed by: SPECIALIST

## 2023-11-27 PROCEDURE — 2720000007 HC OR 272 NO HCPCS: Performed by: SPECIALIST

## 2023-11-27 PROCEDURE — 2500000004 HC RX 250 GENERAL PHARMACY W/ HCPCS (ALT 636 FOR OP/ED): Performed by: NURSE PRACTITIONER

## 2023-11-27 PROCEDURE — 80048 BASIC METABOLIC PNL TOTAL CA: CPT | Performed by: NURSE PRACTITIONER

## 2023-11-27 PROCEDURE — 82947 ASSAY GLUCOSE BLOOD QUANT: CPT

## 2023-11-27 PROCEDURE — 2500000005 HC RX 250 GENERAL PHARMACY W/O HCPCS: Performed by: SPECIALIST

## 2023-11-27 PROCEDURE — 2500000001 HC RX 250 WO HCPCS SELF ADMINISTERED DRUGS (ALT 637 FOR MEDICARE OP): Performed by: SPECIALIST

## 2023-11-27 PROCEDURE — 85027 COMPLETE CBC AUTOMATED: CPT | Performed by: NURSE PRACTITIONER

## 2023-11-27 PROCEDURE — 36415 COLL VENOUS BLD VENIPUNCTURE: CPT | Performed by: NURSE PRACTITIONER

## 2023-11-27 PROCEDURE — 2500000001 HC RX 250 WO HCPCS SELF ADMINISTERED DRUGS (ALT 637 FOR MEDICARE OP): Performed by: NURSE PRACTITIONER

## 2023-11-27 PROCEDURE — 7100000002 HC RECOVERY ROOM TIME - EACH INCREMENTAL 1 MINUTE: Performed by: SPECIALIST

## 2023-11-27 PROCEDURE — RXMED WILLOW AMBULATORY MEDICATION CHARGE

## 2023-11-27 PROCEDURE — 3700000002 HC GENERAL ANESTHESIA TIME - EACH INCREMENTAL 1 MINUTE: Performed by: SPECIALIST

## 2023-11-27 PROCEDURE — 2500000005 HC RX 250 GENERAL PHARMACY W/O HCPCS: Performed by: NURSE ANESTHETIST, CERTIFIED REGISTERED

## 2023-11-27 PROCEDURE — 7100000001 HC RECOVERY ROOM TIME - INITIAL BASE CHARGE: Performed by: SPECIALIST

## 2023-11-27 PROCEDURE — 87070 CULTURE OTHR SPECIMN AEROBIC: CPT | Mod: TRILAB | Performed by: SPECIALIST

## 2023-11-27 PROCEDURE — 3600000003 HC OR TIME - INITIAL BASE CHARGE - PROCEDURE LEVEL THREE: Performed by: SPECIALIST

## 2023-11-27 PROCEDURE — 82550 ASSAY OF CK (CPK): CPT | Performed by: NURSE PRACTITIONER

## 2023-11-27 PROCEDURE — 2500000004 HC RX 250 GENERAL PHARMACY W/ HCPCS (ALT 636 FOR OP/ED): Performed by: SPECIALIST

## 2023-11-27 PROCEDURE — 87075 CULTR BACTERIA EXCEPT BLOOD: CPT | Mod: TRILAB | Performed by: SPECIALIST

## 2023-11-27 PROCEDURE — 3600000008 HC OR TIME - EACH INCREMENTAL 1 MINUTE - PROCEDURE LEVEL THREE: Performed by: SPECIALIST

## 2023-11-27 PROCEDURE — 2500000004 HC RX 250 GENERAL PHARMACY W/ HCPCS (ALT 636 FOR OP/ED): Performed by: NURSE ANESTHETIST, CERTIFIED REGISTERED

## 2023-11-27 PROCEDURE — 1100000001 HC PRIVATE ROOM DAILY

## 2023-11-27 PROCEDURE — 2500000004 HC RX 250 GENERAL PHARMACY W/ HCPCS (ALT 636 FOR OP/ED): Performed by: STUDENT IN AN ORGANIZED HEALTH CARE EDUCATION/TRAINING PROGRAM

## 2023-11-27 PROCEDURE — 3700000001 HC GENERAL ANESTHESIA TIME - INITIAL BASE CHARGE: Performed by: SPECIALIST

## 2023-11-27 RX ORDER — BUPIVACAINE HCL/EPINEPHRINE 0.25-.0005
VIAL (ML) INJECTION AS NEEDED
Status: DISCONTINUED | OUTPATIENT
Start: 2023-11-27 | End: 2023-11-27 | Stop reason: HOSPADM

## 2023-11-27 RX ORDER — MIDAZOLAM HYDROCHLORIDE 1 MG/ML
INJECTION, SOLUTION INTRAMUSCULAR; INTRAVENOUS AS NEEDED
Status: DISCONTINUED | OUTPATIENT
Start: 2023-11-27 | End: 2023-11-27

## 2023-11-27 RX ORDER — ONDANSETRON HYDROCHLORIDE 2 MG/ML
INJECTION, SOLUTION INTRAVENOUS AS NEEDED
Status: DISCONTINUED | OUTPATIENT
Start: 2023-11-27 | End: 2023-11-27

## 2023-11-27 RX ORDER — LIDOCAINE HYDROCHLORIDE AND EPINEPHRINE 10; 10 MG/ML; UG/ML
INJECTION, SOLUTION INFILTRATION; PERINEURAL AS NEEDED
Status: DISCONTINUED | OUTPATIENT
Start: 2023-11-27 | End: 2023-11-27 | Stop reason: HOSPADM

## 2023-11-27 RX ORDER — OXYCODONE HYDROCHLORIDE 5 MG/1
5 TABLET ORAL EVERY 4 HOURS PRN
Status: DISCONTINUED | OUTPATIENT
Start: 2023-11-27 | End: 2023-11-27 | Stop reason: HOSPADM

## 2023-11-27 RX ORDER — OXYCODONE HYDROCHLORIDE 5 MG/1
10 TABLET ORAL EVERY 4 HOURS PRN
Status: DISCONTINUED | OUTPATIENT
Start: 2023-11-27 | End: 2023-11-27 | Stop reason: HOSPADM

## 2023-11-27 RX ORDER — SODIUM CHLORIDE, SODIUM LACTATE, POTASSIUM CHLORIDE, CALCIUM CHLORIDE 600; 310; 30; 20 MG/100ML; MG/100ML; MG/100ML; MG/100ML
100 INJECTION, SOLUTION INTRAVENOUS CONTINUOUS
Status: DISCONTINUED | OUTPATIENT
Start: 2023-11-27 | End: 2023-11-27 | Stop reason: HOSPADM

## 2023-11-27 RX ORDER — OXYCODONE AND ACETAMINOPHEN 5; 325 MG/1; MG/1
1 TABLET ORAL EVERY 6 HOURS PRN
Status: DISCONTINUED | OUTPATIENT
Start: 2023-11-27 | End: 2023-12-01 | Stop reason: HOSPADM

## 2023-11-27 RX ORDER — LIDOCAINE HYDROCHLORIDE 10 MG/ML
INJECTION INFILTRATION; PERINEURAL AS NEEDED
Status: DISCONTINUED | OUTPATIENT
Start: 2023-11-27 | End: 2023-11-27

## 2023-11-27 RX ORDER — BUPIVACAINE HYDROCHLORIDE 5 MG/ML
INJECTION, SOLUTION EPIDURAL; INTRACAUDAL AS NEEDED
Status: DISCONTINUED | OUTPATIENT
Start: 2023-11-27 | End: 2023-11-27 | Stop reason: HOSPADM

## 2023-11-27 RX ORDER — TRANEXAMIC ACID 100 MG/ML
INJECTION, SOLUTION INTRAVENOUS AS NEEDED
Status: DISCONTINUED | OUTPATIENT
Start: 2023-11-27 | End: 2023-11-27 | Stop reason: HOSPADM

## 2023-11-27 RX ORDER — ONDANSETRON HYDROCHLORIDE 2 MG/ML
4 INJECTION, SOLUTION INTRAVENOUS ONCE AS NEEDED
Status: DISCONTINUED | OUTPATIENT
Start: 2023-11-27 | End: 2023-11-27 | Stop reason: HOSPADM

## 2023-11-27 RX ORDER — OXYCODONE AND ACETAMINOPHEN 5; 325 MG/1; MG/1
1 TABLET ORAL EVERY 6 HOURS PRN
Qty: 30 TABLET | Refills: 0 | Status: ON HOLD | OUTPATIENT
Start: 2023-11-27 | End: 2024-03-11 | Stop reason: SDUPTHER

## 2023-11-27 RX ORDER — FENTANYL CITRATE 50 UG/ML
INJECTION, SOLUTION INTRAMUSCULAR; INTRAVENOUS AS NEEDED
Status: DISCONTINUED | OUTPATIENT
Start: 2023-11-27 | End: 2023-11-27

## 2023-11-27 RX ORDER — ONDANSETRON HYDROCHLORIDE 8 MG/1
8 TABLET, FILM COATED ORAL EVERY 8 HOURS PRN
Qty: 20 TABLET | Refills: 0 | Status: SHIPPED | OUTPATIENT
Start: 2023-11-27 | End: 2024-04-24 | Stop reason: HOSPADM

## 2023-11-27 RX ORDER — DAPTOMYCIN 50 MG/ML
INJECTION, POWDER, LYOPHILIZED, FOR SOLUTION INTRAVENOUS AS NEEDED
Status: DISCONTINUED | OUTPATIENT
Start: 2023-11-27 | End: 2023-11-27

## 2023-11-27 RX ORDER — ALBUTEROL SULFATE 0.83 MG/ML
2.5 SOLUTION RESPIRATORY (INHALATION) ONCE AS NEEDED
Status: DISCONTINUED | OUTPATIENT
Start: 2023-11-27 | End: 2023-11-27 | Stop reason: HOSPADM

## 2023-11-27 RX ORDER — PROPOFOL 10 MG/ML
INJECTION, EMULSION INTRAVENOUS AS NEEDED
Status: DISCONTINUED | OUTPATIENT
Start: 2023-11-27 | End: 2023-11-27

## 2023-11-27 RX ORDER — ENOXAPARIN SODIUM 100 MG/ML
40 INJECTION SUBCUTANEOUS EVERY 24 HOURS
Status: CANCELLED | OUTPATIENT
Start: 2023-11-28

## 2023-11-27 RX ORDER — ENOXAPARIN SODIUM 100 MG/ML
40 INJECTION SUBCUTANEOUS DAILY
Qty: 10 EACH | Refills: 0 | Status: SHIPPED | OUTPATIENT
Start: 2023-11-27 | End: 2023-12-11

## 2023-11-27 RX ORDER — ACETAMINOPHEN 325 MG/1
650 TABLET ORAL EVERY 4 HOURS PRN
Status: DISCONTINUED | OUTPATIENT
Start: 2023-11-27 | End: 2023-11-27 | Stop reason: HOSPADM

## 2023-11-27 RX ADMIN — PROPOFOL 100 MG: 10 INJECTION, EMULSION INTRAVENOUS at 16:03

## 2023-11-27 RX ADMIN — CEFEPIME 2 G: 2 INJECTION, POWDER, FOR SOLUTION INTRAVENOUS at 09:46

## 2023-11-27 RX ADMIN — ATORVASTATIN CALCIUM 20 MG: 20 TABLET, FILM COATED ORAL at 20:53

## 2023-11-27 RX ADMIN — LIDOCAINE HYDROCHLORIDE 5 ML: 10 INJECTION, SOLUTION INFILTRATION; PERINEURAL at 15:18

## 2023-11-27 RX ADMIN — OXYCODONE AND ACETAMINOPHEN 1 TABLET: 5; 325 TABLET ORAL at 18:40

## 2023-11-27 RX ADMIN — OXYCODONE HYDROCHLORIDE AND ACETAMINOPHEN 250 MG: 500 TABLET ORAL at 20:54

## 2023-11-27 RX ADMIN — SODIUM CHLORIDE, POTASSIUM CHLORIDE, SODIUM LACTATE AND CALCIUM CHLORIDE: 600; 310; 30; 20 INJECTION, SOLUTION INTRAVENOUS at 15:20

## 2023-11-27 RX ADMIN — FENTANYL CITRATE 50 MCG: 0.05 INJECTION, SOLUTION INTRAMUSCULAR; INTRAVENOUS at 15:18

## 2023-11-27 RX ADMIN — GABAPENTIN 400 MG: 400 CAPSULE ORAL at 20:54

## 2023-11-27 RX ADMIN — PROPOFOL 200 MG: 10 INJECTION, EMULSION INTRAVENOUS at 15:18

## 2023-11-27 RX ADMIN — HYDROMORPHONE HYDROCHLORIDE 0.4 MG: 1 INJECTION, SOLUTION INTRAMUSCULAR; INTRAVENOUS; SUBCUTANEOUS at 17:18

## 2023-11-27 RX ADMIN — FENTANYL CITRATE 50 MCG: 0.05 INJECTION, SOLUTION INTRAMUSCULAR; INTRAVENOUS at 16:25

## 2023-11-27 RX ADMIN — METOPROLOL SUCCINATE 50 MG: 50 TABLET, EXTENDED RELEASE ORAL at 09:46

## 2023-11-27 RX ADMIN — ACETAMINOPHEN 650 MG: 325 TABLET ORAL at 12:07

## 2023-11-27 RX ADMIN — CEFEPIME 2 G: 2 INJECTION, POWDER, FOR SOLUTION INTRAVENOUS at 18:39

## 2023-11-27 RX ADMIN — CEFEPIME 2 G: 2 INJECTION, POWDER, FOR SOLUTION INTRAVENOUS at 00:00

## 2023-11-27 RX ADMIN — DAPTOMYCIN 400 MG: 500 INJECTION, POWDER, LYOPHILIZED, FOR SOLUTION INTRAVENOUS at 15:21

## 2023-11-27 RX ADMIN — PROPOFOL 50 MG: 10 INJECTION, EMULSION INTRAVENOUS at 16:09

## 2023-11-27 RX ADMIN — ONDANSETRON HYDROCHLORIDE 4 MG: 2 INJECTION INTRAMUSCULAR; INTRAVENOUS at 15:24

## 2023-11-27 RX ADMIN — PROPOFOL 50 MG: 10 INJECTION, EMULSION INTRAVENOUS at 16:14

## 2023-11-27 RX ADMIN — FENTANYL CITRATE 25 MCG: 0.05 INJECTION, SOLUTION INTRAMUSCULAR; INTRAVENOUS at 16:03

## 2023-11-27 RX ADMIN — FENTANYL CITRATE 25 MCG: 0.05 INJECTION, SOLUTION INTRAMUSCULAR; INTRAVENOUS at 15:39

## 2023-11-27 RX ADMIN — HYDROMORPHONE HYDROCHLORIDE 0.4 MG: 1 INJECTION, SOLUTION INTRAMUSCULAR; INTRAVENOUS; SUBCUTANEOUS at 17:07

## 2023-11-27 RX ADMIN — SODIUM CHLORIDE, POTASSIUM CHLORIDE, SODIUM LACTATE AND CALCIUM CHLORIDE: 600; 310; 30; 20 INJECTION, SOLUTION INTRAVENOUS at 15:08

## 2023-11-27 RX ADMIN — SERTRALINE 100 MG: 100 TABLET, FILM COATED ORAL at 20:53

## 2023-11-27 RX ADMIN — MIDAZOLAM HYDROCHLORIDE 2 MG: 1 INJECTION, SOLUTION INTRAMUSCULAR; INTRAVENOUS at 15:13

## 2023-11-27 SDOH — ECONOMIC STABILITY: FOOD INSECURITY: WITHIN THE PAST 12 MONTHS, THE FOOD YOU BOUGHT JUST DIDN'T LAST AND YOU DIDN'T HAVE MONEY TO GET MORE.: NEVER TRUE

## 2023-11-27 SDOH — ECONOMIC STABILITY: FOOD INSECURITY: WITHIN THE PAST 12 MONTHS, YOU WORRIED THAT YOUR FOOD WOULD RUN OUT BEFORE YOU GOT MONEY TO BUY MORE.: NEVER TRUE

## 2023-11-27 ASSESSMENT — PAIN - FUNCTIONAL ASSESSMENT
PAIN_FUNCTIONAL_ASSESSMENT: 0-10

## 2023-11-27 ASSESSMENT — PAIN SCALES - GENERAL
PAINLEVEL_OUTOF10: 0 - NO PAIN
PAINLEVEL_OUTOF10: 3
PAINLEVEL_OUTOF10: 7
PAINLEVEL_OUTOF10: 5 - MODERATE PAIN
PAINLEVEL_OUTOF10: 7
PAINLEVEL_OUTOF10: 3
PAINLEVEL_OUTOF10: 4
PAINLEVEL_OUTOF10: 7
PAINLEVEL_OUTOF10: 0 - NO PAIN

## 2023-11-27 ASSESSMENT — PAIN DESCRIPTION - ORIENTATION
ORIENTATION: LEFT
ORIENTATION: LEFT

## 2023-11-27 ASSESSMENT — COGNITIVE AND FUNCTIONAL STATUS - GENERAL
MOBILITY SCORE: 24
DAILY ACTIVITIY SCORE: 24

## 2023-11-27 ASSESSMENT — PAIN DESCRIPTION - LOCATION
LOCATION: BREAST
LOCATION: HEAD
LOCATION: BREAST

## 2023-11-27 ASSESSMENT — ACTIVITIES OF DAILY LIVING (ADL): LACK_OF_TRANSPORTATION: NO

## 2023-11-27 NOTE — NURSING NOTE
Assumed care of patient. Patient is A&Ox3. Call light in reach. Patient denies pain. Patient is on room air.

## 2023-11-27 NOTE — ANESTHESIA PREPROCEDURE EVALUATION
Patient: Sabrina Michaud    Procedure Information       Date/Time: 11/27/23 0730    Procedure: Removal Implant Breast (Left) - LMA ok    [facility] / [facility]    Surgeons: Tim Dorman MD            Relevant Problems   Cardiovascular   (+) Atrial tachycardia   (+) Benign essential hypertension      Endocrine   (+) Class 1 obesity with body mass index (BMI) of 31.0 to 31.9 in adult   (+) Diabetes mellitus type 2 in obese (CMS/HCC)      /Renal   (+) Hepatic cirrhosis (CMS/HCC)      Neuro/Psych   (+) Anxiety   (+) Depression      GI/Hepatic   (+) Hepatic cirrhosis (CMS/HCC)      Hematology   (+) Anemia      Infectious Disease   (+) Acute osteomyelitis of foot (CMS/HCC)   (+) Other acute osteomyelitis, unspecified ankle and foot (CMS/HCC)      Other   (+) Acute osteomyelitis of foot (CMS/HCC)   (+) Other acute osteomyelitis, unspecified ankle and foot (CMS/HCC)     Past Medical History:   Diagnosis Date    Breast CA (CMS/HCC)     Diabetes mellitus (CMS/HCC)     Hypertension     Neoplasm of unspecified behavior of bone, soft tissue, and skin 08/22/2016    Skin growth     Past Surgical History:   Procedure Laterality Date    BREAST SURGERY Bilateral     mastectomy with reconstruction    BREAST SURGERY Left     Debridement x 2    HYSTERECTOMY  05/31/2016    Hysterectomy    MASTECTOMY Bilateral     with repair    TONSILLECTOMY  05/31/2016    Tonsillectomy     Allergies   Allergen Reactions    Other Other     Anesthesia IV Set MISC- Nausea    Piperacillin-Tazobactam Swelling, Hives and Itching    Vancomycin Itching and Rash       Clinical information reviewed:   Tobacco  Allergies  Meds  Problems  Med Hx  Surg Hx   Fam Hx  Soc   Hx        NPO Detail:  No data recorded     Physical Exam    Airway  Mallampati: II  TM distance: >3 FB  Neck ROM: full     Cardiovascular   Rhythm: regular  Rate: normal     Dental    Pulmonary   Breath sounds clear to auscultation     Abdominal            Anesthesia  Plan    ASA 3     general     intravenous induction   Postoperative administration of opioids is intended.  Trial extubation is planned.  Anesthetic plan and risks discussed with patient.  Use of blood products discussed with patient who.    Plan discussed with CRNA.

## 2023-11-27 NOTE — CARE PLAN
The patient's goals for the shift include      The clinical goals for the shift include rest    Over the shift, the patient did not make progress toward the following goals. Barriers to progression include . Recommendations to address these barriers include   Problem: Diabetes  Goal: Achieve decreasing blood glucose levels by end of shift  Outcome: Progressing  Goal: Increase stability of blood glucose readings by end of shift  Outcome: Progressing  Goal: Decrease in ketones present in urine by end of shift  Outcome: Progressing  Goal: Maintain electrolyte levels within acceptable range throughout shift  Outcome: Progressing  Goal: Maintain glucose levels >70mg/dl to <250mg/dl throughout shift  Outcome: Progressing  Goal: No changes in neurological exam by end of shift  Outcome: Progressing  Goal: Learn about and adhere to nutrition recommendations by end of shift  Outcome: Progressing  Goal: Vital signs within normal range for age by end of shift  Outcome: Progressing  Goal: Increase self care and/or family involovement by end of shift  Outcome: Progressing  Goal: Receive DSME education by end of shift  Outcome: Progressing     Problem: Skin  Goal: Decreased wound size/increased tissue granulation at next dressing change  Outcome: Progressing  Goal: Participates in plan/prevention/treatment measures  Outcome: Progressing  Goal: Prevent/manage excess moisture  Outcome: Progressing  Goal: Prevent/minimize sheer/friction injuries  Outcome: Progressing  Goal: Promote/optimize nutrition  Outcome: Progressing  Goal: Promote skin healing  Outcome: Progressing   .

## 2023-11-27 NOTE — PROGRESS NOTES
Music Therapy Note    Sabrina Keily was referred by nursing    Therapy Session  Referral Type: New referral this admission  Visit Type: New visit  Session Start Time: 1347  Intervention Delivery: In-person  Conflict of Service: Off unit               Treatment/Interventions                 Education Documentation  No documentation found.

## 2023-11-27 NOTE — NURSING NOTE
NPO after midnight, however allowed clears until 8am 11-27-23, for OR tomorrow, VS stable,  at bedside for most of day.

## 2023-11-27 NOTE — OP NOTE
Removal implant and acellular dermal matrix left breast (L) Operative Note     Date: 2023 - 2023  OR Location: [facility]    Name: Sabrina Michaud, : 1965, Age: 58 y.o., MRN: [id number], Sex: female    Diagnosis  Pre-op Diagnosis     * Cellulitis of left breast [N61.0]     * History of breast cancer [Z85.3]     * Seroma of breast [N64.89]     * Diabetes mellitus type 2 in obese (CMS/HCC) [E11.69, E66.9] Post-op Diagnosis     * Cellulitis of left breast [N61.0]     * History of breast cancer [Z85.3]     * Seroma of breast [N64.89]     * Diabetes mellitus type 2 in obese (CMS/HCC) [E11.69, E66.9]     Procedures  Removal implant and 325 cm squaredacellular dermal matrix left breast  Left capsulectomy   - AL REMOVAL INTACT BREAST IMPLANT  Pecs 1 and 2 block of chest      Surgeons      * Tim Dorman - Primary    Assistant NATALIIA Segovia  Procedure Summary  Anesthesia: General  ASA: III  Anesthesia Staff: Anesthesiologist: Fara Robert MD  CRNA: SNOW Aguilar-CRNA  Estimated Blood Loss: 50mL  Intra-op Medications:   Medication Name Total Dose   insulin lispro (HumaLOG) injection 0-5 Units Cannot be calculated              Anesthesia Record               Intraprocedure I/O Totals          Intake    Propofol Drip 0.00 mL    The total shown is the total volume documented since Anesthesia Start was filed.    Total Intake 0 mL       Output    Est. Blood Loss 50 mL    Total Output 50 mL       Net    Net Volume -50 mL          Specimen:   ID Type Source Tests Collected by Time   A : LEFT BREAST PERIPROSTHETIC FLUID Swab BREAST CAPSULE LEFT TISSUE/WOUND CULTURE/SMEAR Tim Dorman MD 2023 1534        Staff:   Circulator: Sam Edwards, LAURA  Scrub Person: Aby Pereira         10 Spanish drains x 2    Findings: Culture pending from periprosthetic seroma fluid    indications: Sabrina Michaud is an 58 y.o. female who is having surgery for Cellulitis of left breast [N61.0]  History of breast  cancer [Z85.3]  Seroma of breast [N64.89]  Diabetes mellitus type 2 in obese (CMS/HCC) [E11.69, E66.9].     The patient was seen in the preoperative area. The risks, benefits, complications, treatment options, non-operative alternatives, expected recovery and outcomes were discussed with the patient. The possibilities of reaction to medication, pulmonary aspiration, injury to surrounding structures, bleeding, recurrent infection, the need for additional procedures, failure to diagnose a condition, and creating a complication requiring transfusion or operation were discussed with the patient. The patient concurred with the proposed plan, giving informed consent.  The site of surgery was properly noted/marked if necessary per policy. The patient has been actively warmed in preoperative area. Preoperative antibiotics have been ordered and given within 1 hours of incision. Venous thrombosis prophylaxis have been ordered including bilateral sequential compression devices    Procedure Details: The patient was identified in PACU as she was an inpatient from the floor.  Her surgical site was marked.  Her risks were reviewed in the way forward from explanting and implant reconstruction was discussed with her and her spouse.  They understood.  A team huddle was performed with Dr. Bertrand and team.  She was taken to the operating room by Dr. Bertrand in a check and was observed.  Flowtron's were on and functioning prior to the induction of anesthesia.  Antibiotic was administered on-call to the operating room.  General anesthesia was established and LMA placed.  She was prepped and draped in usual fashion with Betasept.  Timeout was observed.    She was infiltrated with 10 cc of 1% lidocaine with epinephrine mixed one-to-one with 0.5 Marcaine plain.  The constriction was awaited.  An incision was created in the medial lower pole of the Wise pattern excising a previously healing delayed area of the central T portion incorporating  this into the excision.  The medial skin flap was dissected over the de-epithelialized Goldilocks flap cephalically for approximately 12 cm maintaining the adherence of the nipple areolar complex centrally.  The medial Goldilocks flap was dissected to the edge of its domain and the periprosthetic acellular dermal matrix was encountered.  This was then dissected bluntly until the plane of dissection could be established.  There was no bio incorporation medially as noted previously.  This was then circumferentially dissected as far as possible.  The Alloderm was divided sharply with scissors and the implant intact was explanted.  Then circumferential dissection was carried out using lighted retractor, headlight, 2.5 loupe magnification, surgical assistant, bipolar cautery and Bovie coagulation removing the entire domain of acellular dermal matrix in 1 piece.  Hemostasis was achieved with bipolar cautery along the way.  The space was pulse lavaged with 3 L of saline.  The area was irrigated with Irrisept.  The space was inspected for hemostasis which was completed with bipolar cautery.  10 Two Slovak drains were brought out through the lateral limb of the inframammary fold incision and secured at the skin with 3-0 nylon.  The space was then inspected and a laparotomy pad containing 500 mg of TXA and 60 cc of saline was instilled for 5 or 6 minutes to ensure punctate hemorrhage was controlled.  The space was pristinely dry at the end of the procedure.  The flaps were then reapposed with figure-of-eight 2-0 Vicryl in the deep space closing the deep space.  The skin edges were coapted with surgical staples and then substituted for figure-of-eight 3-0 Monocryl and intervening 3-0 Monocryl buried sutures.  All staples were removed and the subsequent 3-0 Monocryl's were placed.  The skin was closed with running subcuticular 3-0 Monocryl.  Sponge needle instrument counts were correct on 2 occasions the bulbs held suction.   Eli was placed on the anterior chest wall.  Curlex gauze pad was found folded onto the chest wall.  A surgical bra was applied after she was cleansed with Betasept and blood.  Loss was 50 ml.  Complications:  None; patient tolerated the procedure well.    Disposition: PACU - hemodynamically stable.  Condition: stable       Tim MCKEON Dandy  Phone Number: [phone]

## 2023-11-27 NOTE — ANESTHESIA PROCEDURE NOTES
Airway  Date/Time: 11/27/2023 3:18 PM  Urgency: elective    Airway not difficult    Staffing  Performed: CRNA   Authorized by: Fara Robert MD    Performed by: SNOW Aguilar-FLORA  Patient location during procedure: OR    Indications and Patient Condition  Indications for airway management: anesthesia and airway protection  Spontaneous Ventilation: absent  Sedation level: deep  Preoxygenated: yes  Patient position: sniffing  MILS maintained throughout  Mask difficulty assessment: 1 - vent by mask  Planned trial extubation    Final Airway Details  Final airway type: supraglottic airway      Successful airway: classic  Size 3     Number of attempts at approach: 1  Ventilation between attempts: BVM and none  Number of other approaches attempted: 0    Additional Comments  No change to oral cavity/ dentition

## 2023-11-27 NOTE — ANESTHESIA POSTPROCEDURE EVALUATION
Patient: Sabrina Michaud    Procedure Summary       Date: 11/27/23 Room / Location: [facility]    Anesthesia Start: 1507 Anesthesia Stop: 1654    Procedure: Removal implant and acellular dermal matrix left breast (Left) Diagnosis:       Cellulitis of left breast      History of breast cancer      Seroma of breast      Diabetes mellitus type 2 in obese (CMS/HCC)      (Cellulitis of left breast [N61.0])      (History of breast cancer [Z85.3])      (Seroma of breast [N64.89])      (Diabetes mellitus type 2 in obese (CMS/HCC) [E11.69, E66.9])    Surgeons: Tim Dorman MD Responsible Provider: Fara Robert MD    Anesthesia Type: general ASA Status: 3            Anesthesia Type: general    Vitals Value Taken Time   /65 11/27/23 1651   Temp 36.8 °C (98.2 °F) 11/27/23 1651   Pulse 92 11/27/23 1651   Resp 16 11/27/23 1651   SpO2 98 % 11/27/23 1651       Anesthesia Post Evaluation    Patient location during evaluation: bedside  Patient participation: complete - patient participated  Level of consciousness: awake and alert  Pain management: adequate  Multimodal analgesia pain management approach  Airway patency: patent  Cardiovascular status: acceptable  Respiratory status: acceptable  Hydration status: acceptable  Postoperative Nausea and Vomiting: none        There were no known notable events for this encounter.

## 2023-11-27 NOTE — PROGRESS NOTES
"Sabrina Michaud is a 58 y.o. female on day 2 of admission presenting with Cellulitis.    Subjective   During nursinf rounds, RN informed TCSW pt will have the left breast implant removed today after 1400 and will transfer to surg-tele post op.     TCSW engaged with pt at bedside to complete assessment and SDOH questions. Pt lives home with her spouse, 2-3 steps to enter, bed and bath on the first floor. Pt reports being independent in ADL's and ambulation. Pt anticipates to discharge home with no needs when medically cleared. If pt does require long term IV ATBX, pt will require home IV infusion, Pt's home going needs will be reassessed post-op.        Objective     Physical Exam    Last Recorded Vitals  Blood pressure (!) 134/96, pulse 79, temperature 36.6 °C (97.9 °F), temperature source Oral, resp. rate 17, height 1.6 m (5' 2.99\"), weight 85.3 kg (188 lb 0.8 oz), SpO2 97 %.  Intake/Output last 3 Shifts:  I/O last 3 completed shifts:  In: 920 (10.8 mL/kg) [P.O.:720; IV Piggyback:200]  Out: 750 (8.8 mL/kg) [Urine:750 (0.2 mL/kg/hr)]  Weight: 85.3 kg     Relevant Results                             Assessment/Plan   Principal Problem:    Cellulitis  Active Problems:    Benign essential hypertension    Diabetes mellitus type 2 in obese (CMS/HCC)    Dyslipidemia    Cellulitis of left breast    Seroma of breast    History of breast cancer               Viridiana Gross, LCSW      "

## 2023-11-27 NOTE — PROGRESS NOTES
Sabrina Michaud is a 58 y.o. female on day 2 of admission presenting with Cellulitis.    Subjective   Interval History:   Afebrile, no chills  Awaiting surgery today  Pain controlled  No nausea or vomiting    Review of Systems    Objective   Range of Vitals (last 24 hours)  Heart Rate:  [75-79]   Temp:  [36.6 °C (97.9 °F)-36.9 °C (98.4 °F)]   Resp:  [16-18]   BP: (129-140)/(57-68)   SpO2:  [95 %-98 %]   Daily Weight  11/25/23 : 85.3 kg (188 lb 0.8 oz)    Body mass index is 33.32 kg/m².    Physical Exam  onstitutional:       Appearance: Normal appearance.   HENT:      Head: Normocephalic and atraumatic.      Nose: Nose normal.      Mouth/Throat:      Mouth: Mucous membranes are moist.      Pharynx: Oropharynx is clear.   Eyes:      Extraocular Movements: Extraocular movements intact.      Conjunctiva/sclera: Conjunctivae normal.   Cardiovascular:      Rate and Rhythm: Normal rate and regular rhythm.   Pulmonary:      Effort: Pulmonary effort is normal.      Breath sounds: Normal breath sounds.   Abdominal:      General: Bowel sounds are normal.      Palpations: Abdomen is soft.   Musculoskeletal:         General: Normal range of motion.      Cervical back: Normal range of motion and neck supple.   Skin:     Comments:  Lower Left breast erythema, induration, tenderness, no drainage noted  Neurological:      General: No focal deficit present.      Mental Status: She is alert and oriented to person, place, and time. Mental status is at baseline.   Psychiatric:         Mood and Affect: Mood normal.         Behavior: Behavior normal.     Antibiotics  DAPTOmycin (Cubicin) 500 mg/50 mL  mg  cefepime (Maxipime) 2 g in dextrose 5 % 100 mL IV  amLODIPine (Norvasc) tablet 10 mg  ascorbic acid (Vitamin C) tablet 250 mg  atorvastatin (Lipitor) tablet 20 mg  ferrous gluconate (Fergon) 324 (38 Fe) mg tablet 38 mg of iron  gabapentin (Neurontin) capsule 400 mg  losartan 50 mg, hydroCHLOROthiazide 12.5 mg for Hyzaar  50/12.5  metoprolol succinate XL (Toprol-XL) 24 hr tablet 50 mg  sertraline (Zoloft) tablet 100 mg  sitaGLIPtin phos-metformin (Janumet)  mg per tablet 1 tablet  enoxaparin (Lovenox) syringe 40 mg  acetaminophen (Tylenol) tablet 650 mg  acetaminophen (Tylenol) tablet 650 mg  polyethylene glycol (Glycolax, Miralax) packet 17 g  benzocaine-menthol (Cepastat Sore Throat) 15-3.6 mg lozenge 1 lozenge  dextromethorphan-guaifenesin (Robitussin DM)  mg/5 mL oral liquid 5 mL  dextrose 50 % injection 25 g  glucagon (Glucagen) injection 1 mg  dextrose 10 % in water (D10W) infusion  insulin lispro (HumaLOG) injection 0-5 Units  cefepime (Maxipime) 2 g in dextrose 5 % 100 mL IV  DAPTOmycin (Cubicin) 500 mg/50 mL  mg  iohexol (OMNIPaque) 350 mg iodine/mL solution 75 mL  enoxaparin (Lovenox) syringe 40 mg  losartan (Cozaar) tablet 50 mg  hydroCHLOROthiazide (Microzide) capsule 12.5 mg  dextrose 5 % and lactated Ringer's infusion  celecoxib (CeleBREX) capsule 400 mg  acetaminophen (Tylenol) tablet 650 mg  ondansetron ODT (Zofran-ODT) disintegrating tablet 4 mg  chlorhexidine (Hibiclens) 4 % liquid 1 Application  DAPTOmycin (Cubicin) 400 mg in sodium chloride 0.9% 50 mL IV      Relevant Results  Labs  Results from last 72 hours   Lab Units 11/27/23  0431 11/26/23  0423 11/25/23  1446   WBC AUTO x10*3/uL 5.4 5.3 5.8   HEMOGLOBIN g/dL 11.6* 11.4* 11.5*   HEMATOCRIT % 36.3 35.1* 34.9*   PLATELETS AUTO x10*3/uL 103* 97* 100*   NEUTROS PCT AUTO %  --   --  62.3   LYMPHS PCT AUTO %  --   --  25.1   MONOS PCT AUTO %  --   --  8.3   EOS PCT AUTO %  --   --  3.6     Results from last 72 hours   Lab Units 11/27/23  0431 11/26/23  0423 11/25/23  1446   SODIUM mmol/L 139 140 138   POTASSIUM mmol/L 3.5 3.7 3.9   CHLORIDE mmol/L 105 105 104   CO2 mmol/L 23* 24 24   BUN mg/dL 15 15 15   CREATININE mg/dL 0.50 0.40 0.60   GLUCOSE mg/dL 120* 121* 89   CALCIUM mg/dL 9.5 9.3 9.7   ANION GAP mmol/L 11 11 10   EGFR mL/min/1.73m*2 >90  >90 >90     Results from last 72 hours   Lab Units 11/26/23  0423 11/25/23  1446   ALK PHOS U/L 159* 168*   BILIRUBIN TOTAL mg/dL 0.9 0.7   PROTEIN TOTAL g/dL 7.4 8.0*   ALT U/L 27 30   AST U/L 40 47*   ALBUMIN g/dL 4.1 4.4     Estimated Creatinine Clearance: 125 mL/min (by C-G formula based on SCr of 0.5 mg/dL).  CRP   Date Value Ref Range Status   06/01/2022 1.02 (A) mg/dL Final     Comment:     REF VALUE  < 1.00     05/31/2022 0.77 mg/dL Final     Comment:     REF VALUE  < 1.00     05/20/2021 1.21 (A) mg/dL Final     Comment:     REF VALUE  < 1.00       Microbiology  No results found for the last 14 days.  Blood culture pending    Imaging  CT chest w IV contrast    Result Date: 11/25/2023  Interpreted By:  Walker Figueroa, STUDY: CT CHEST W IV CONTRAST; 11/25/2023 7:25 pm   INDICATION: Signs/Symptoms:abscess. Redness and warmth left breast. Patient has had 2 prior surgeries for infection previous mastectomy, reconstruction site.   COMPARISON: 08/17/2022   ACCESSION NUMBER(S): [id number]   ORDERING CLINICIAN: YANNA CHAMORRO   TECHNIQUE: The examination was performed with the intravenous injection of 75 ml of non-ionic iodinated contrast was injected intravenously.   A helical acquisition data was obtained with sagittal coronal reconstructions performed.   One or more of the following dose reduction techniques were used: Automated exposure control Adjustment of the mA and/or kV according to patient size, and/or use of iterative reconstruction technique.   FINDINGS: There is no hilar or mediastinal lymphadenopathy present.  No infiltrates or effusions are identified.   Chest Wall: There are bilateral breast implants present. There is no evidence for a chest wall abscess. There is a hazy appearance of the fat surrounding the implants bilaterally, nonspecific in appearance. It is unclear whether these findings are postsurgical in nature or whether the air indicative of underlying infection. Cortney-implant haziness is  slightly more pronounced on the left compared to the right, particularly inferiorly and medially.   There is a 2.2 cm rounded lesion with fluid attenuation within the left axilla possibly representing a small residual postoperative seroma/hematoma from axillary lymph node resection. Abscess is not entirely excluded although there is no surrounding haziness of the adjacent fat making this somewhat less likely.   Upper Abdominal Findings: There is a nodular appearance of the hepatic contour consistent with cirrhosis. There is recanalization of the umbilical vein consistent with portal venous hypertension. Splenomegaly is also partially included within the field of view, consistent with portal venous hypertension. There are few too small to characterize hypodense foci within the right and left lobes of the liver most likely representing cysts with the largest measuring 1.4 cm within the left lobe. These are unchanged compared to the prior study from 08/17/2022.   Skeletal System: No fractures or destructive lesions are identified.       1. There is a small, 2.2 cm rounded area of fluid attenuation within the left axilla possibly representing a small hematoma/seroma from axillary lymph node resection. 2. Bilateral breast implants are present with haziness of the surrounding fat with the haziness slightly more pronounced surrounding the left breast implant. A discrete abscess is not identified adjacent to either implant. 3. Cirrhosis with portal venous hypertension and splenomegaly as noted above.   MACRO: none   Signed by: Walker Figueroa 11/25/2023 8:04 PM Dictation workstation:   [id number]    ECG 12 lead    Result Date: 10/31/2023  Normal sinus rhythm Normal ECG When compared with ECG of 17-AUG-2022 08:45, Previous ECG has undetermined rhythm, needs review QT has shortened Confirmed by Herson Quiroga ([id number]) on 10/31/2023 4:04:56 PM    BI US breast limited left    Result Date: 10/31/2023  Interpreted By:  Tanya  Lucien, STUDY: BI US BREAST LIMITED LEFT; 10/31/2023 1:58 pm   INDICATION: Signs/Symptoms:possible infection. Rule out abscess.   COMPARISON: Mammograms from May and June 2023   ACCESSION NUMBER(S): [id number]   ORDERING CLINICIAN: NEDRA DING   TECHNIQUE: Limited real-time grayscale and color Doppler imaging of the  breast was performed.   FINDINGS: Patient has previous mastectomy and reconstruction surgery. Elongated fluid collection is seen anteriorly to the left breast implant, measuring 6.4 by 0.5 by 3.7 cm. On the Doppler images there is no associated surrounding hyperemia.       Elongated fluid collection adjacent to the left breast implant, without associated surrounding hyperemia, most likely postsurgical seroma. No definite drainable abscess visualized.   ACR BI-RADS 2:  Benign finding.   Correlate clinically and follow-up as needed.   MACRO: None.   Signed by: Lucien Martínez 10/31/2023 2:16 PM Dictation workstation:   [id number]        Assessment/Plan   Left breast cellulitis  Left breast prosthetic implant- infected  Diabetes Mellitus  Previous culture with pseudomonas, Corynebacterium        IV Cefepime  IV daptomycin  Follow up culture  Follow up intraoperative findings  Monitor temp and WBC  Local care  Plastic surgery plan OR on 11/27  Further recommendation based on culture/intraoperative findings       Angelica Philippe, APRN-CNP

## 2023-11-27 NOTE — PROGRESS NOTES
Sabrina Michaud is a 58 y.o. female on day 2 of admission presenting with Cellulitis.      Subjective   Patient seen and examined. Resting in bed. States she feels ok, pain well controlled at this time. Discussed POC. Afebrile.        Objective     Last Recorded Vitals  /59 (BP Location: Left arm, Patient Position: Lying)   Pulse 78   Temp 36.6 °C (97.9 °F) (Oral)   Resp 18   Wt 85.3 kg (188 lb 0.8 oz)   SpO2 97%   Intake/Output last 3 Shifts:    Intake/Output Summary (Last 24 hours) at 11/27/2023 0907  Last data filed at 11/27/2023 0030  Gross per 24 hour   Intake 920 ml   Output 750 ml   Net 170 ml       Admission Weight  Weight: 85.3 kg (188 lb 0.8 oz) (11/25/23 1413)    Daily Weight  11/25/23 : 85.3 kg (188 lb 0.8 oz)    Image Results    No imaging to review over the past 24 hours.     Physical Exam    General: Alert and oriented x3, pleasant.   Cardiac: Regular rate and rhythm, S1/S2 , no murmur.   Pulmonary: Clear to auscultation on room air.   Abdomen: Soft, round, nontender. BS +x4.   Extremities: No edema.  Skin: Erythema to the lower left breast, no drainage seen, no open wounds visualized.       Relevant Results    Scheduled medications  amLODIPine, 10 mg, oral, Daily  ascorbic acid, 250 mg, oral, BID  atorvastatin, 20 mg, oral, Nightly  cefepime, 2 g, intravenous, q8h  daptomycin, 6 mg/kg (Adjusted), intravenous, q24h JM  [Held by provider] enoxaparin, 40 mg, subcutaneous, q24h  ferrous gluconate, 38 mg of iron, oral, BID with meals  gabapentin, 400 mg, oral, TID  losartan, 50 mg, oral, Daily   And  hydroCHLOROthiazide, 12.5 mg, oral, Daily  insulin lispro, 0-5 Units, subcutaneous, TID with meals  metoprolol succinate XL, 50 mg, oral, Daily  sertraline, 100 mg, oral, BID  [Held by provider] sitaGLIPtin phos-metformin, 1 tablet, oral, BID with meals      Continuous medications     PRN medications  PRN medications: acetaminophen, acetaminophen, benzocaine-menthol, dextromethorphan-guaifenesin,  dextrose 10 % in water (D10W), dextrose, glucagon, polyethylene glycol     Results for orders placed or performed during the hospital encounter of 11/25/23 (from the past 24 hour(s))   POCT GLUCOSE   Result Value Ref Range    POCT Glucose 115 (H) 74 - 99 mg/dL   POCT GLUCOSE   Result Value Ref Range    POCT Glucose 135 (H) 74 - 99 mg/dL   POCT GLUCOSE   Result Value Ref Range    POCT Glucose 115 (H) 74 - 99 mg/dL   Basic metabolic panel   Result Value Ref Range    Glucose 120 (H) 65 - 99 mg/dL    Sodium 139 133 - 145 mmol/L    Potassium 3.5 3.4 - 5.1 mmol/L    Chloride 105 97 - 107 mmol/L    Bicarbonate 23 (L) 24 - 31 mmol/L    Urea Nitrogen 15 8 - 25 mg/dL    Creatinine 0.50 0.40 - 1.60 mg/dL    eGFR >90 >60 mL/min/1.73m*2    Calcium 9.5 8.5 - 10.4 mg/dL    Anion Gap 11 <=19 mmol/L   CBC   Result Value Ref Range    WBC 5.4 4.4 - 11.3 x10*3/uL    nRBC 0.0 0.0 - 0.0 /100 WBCs    RBC 3.66 (L) 4.00 - 5.20 x10*6/uL    Hemoglobin 11.6 (L) 12.0 - 16.0 g/dL    Hematocrit 36.3 36.0 - 46.0 %    MCV 99 80 - 100 fL    MCH 31.7 26.0 - 34.0 pg    MCHC 32.0 32.0 - 36.0 g/dL    RDW 13.6 11.5 - 14.5 %    Platelets 103 (L) 150 - 450 x10*3/uL   Creatine Kinase   Result Value Ref Range    Creatine Kinase 35 24 - 195 U/L   POCT GLUCOSE   Result Value Ref Range    POCT Glucose 131 (H) 74 - 99 mg/dL           Assessment/Plan      Cellulitis/infection left breast implant  -History of breast cancer s/p bilateral mastectomy with reconstruction   -Dr. Dorman follows. Plan for OR today for implant removal  -Continue IV Abx per ID- currently on daptomycin and cefepime  -Anticipate intra op cultures to be sent  -ID follows          -CT shows 2.2 cm area of fluid retention left axilla, possibly small hematoma/seroma. No abscess identified                Benign essential hypertension  -Continue home medications   -BP stable, monitor     Diabetes mellitus   -Holding Janumet and Ozempic for now   -AC/HS glucose with SSI coverage   -Hypoglycemia  protocol       -Monitor blood sugars            Dyslipidemia  -Continue statin       Plan  ID and Plastics on board.   Continue IV abx- currently on daptomycin and cefepime.   Plan for OR today for implant removal.   Has been on IV abx in the past for the same issue, will see what abx plan for DC is pending OR today and ID input. Anticipate IV abx again at DC. She has done them at home before.   SS consult for DC planning assistance.         SNOW Metz-CNP

## 2023-11-28 ENCOUNTER — APPOINTMENT (OUTPATIENT)
Dept: WOUND CARE | Facility: HOSPITAL | Age: 58
End: 2023-11-28
Payer: COMMERCIAL

## 2023-11-28 DIAGNOSIS — E87.6 HYPOKALEMIA: ICD-10-CM

## 2023-11-28 DIAGNOSIS — I10 BENIGN ESSENTIAL HYPERTENSION: ICD-10-CM

## 2023-11-28 DIAGNOSIS — F32.A DEPRESSION, UNSPECIFIED DEPRESSION TYPE: ICD-10-CM

## 2023-11-28 LAB
ANION GAP SERPL CALC-SCNC: 9 MMOL/L
BUN SERPL-MCNC: 14 MG/DL (ref 8–25)
CALCIUM SERPL-MCNC: 8.9 MG/DL (ref 8.5–10.4)
CHLORIDE SERPL-SCNC: 107 MMOL/L (ref 97–107)
CO2 SERPL-SCNC: 22 MMOL/L (ref 24–31)
CREAT SERPL-MCNC: 0.5 MG/DL (ref 0.4–1.6)
ERYTHROCYTE [DISTWIDTH] IN BLOOD BY AUTOMATED COUNT: 13.6 % (ref 11.5–14.5)
GFR SERPL CREATININE-BSD FRML MDRD: >90 ML/MIN/1.73M*2
GLUCOSE BLD MANUAL STRIP-MCNC: 131 MG/DL (ref 74–99)
GLUCOSE BLD MANUAL STRIP-MCNC: 148 MG/DL (ref 74–99)
GLUCOSE BLD MANUAL STRIP-MCNC: 174 MG/DL (ref 74–99)
GLUCOSE BLD MANUAL STRIP-MCNC: 256 MG/DL (ref 74–99)
GLUCOSE SERPL-MCNC: 117 MG/DL (ref 65–99)
HCT VFR BLD AUTO: 34.9 % (ref 36–46)
HGB BLD-MCNC: 10.9 G/DL (ref 12–16)
MCH RBC QN AUTO: 31.4 PG (ref 26–34)
MCHC RBC AUTO-ENTMCNC: 31.2 G/DL (ref 32–36)
MCV RBC AUTO: 101 FL (ref 80–100)
NRBC BLD-RTO: 0 /100 WBCS (ref 0–0)
PLATELET # BLD AUTO: 92 X10*3/UL (ref 150–450)
POTASSIUM SERPL-SCNC: 3.9 MMOL/L (ref 3.4–5.1)
RBC # BLD AUTO: 3.47 X10*6/UL (ref 4–5.2)
SODIUM SERPL-SCNC: 138 MMOL/L (ref 133–145)
WBC # BLD AUTO: 5.7 X10*3/UL (ref 4.4–11.3)

## 2023-11-28 PROCEDURE — 2500000002 HC RX 250 W HCPCS SELF ADMINISTERED DRUGS (ALT 637 FOR MEDICARE OP, ALT 636 FOR OP/ED): Performed by: NURSE PRACTITIONER

## 2023-11-28 PROCEDURE — 2500000001 HC RX 250 WO HCPCS SELF ADMINISTERED DRUGS (ALT 637 FOR MEDICARE OP)

## 2023-11-28 PROCEDURE — 36415 COLL VENOUS BLD VENIPUNCTURE: CPT | Performed by: NURSE PRACTITIONER

## 2023-11-28 PROCEDURE — 2500000004 HC RX 250 GENERAL PHARMACY W/ HCPCS (ALT 636 FOR OP/ED): Performed by: NURSE PRACTITIONER

## 2023-11-28 PROCEDURE — 2500000004 HC RX 250 GENERAL PHARMACY W/ HCPCS (ALT 636 FOR OP/ED): Performed by: SPECIALIST

## 2023-11-28 PROCEDURE — 2500000001 HC RX 250 WO HCPCS SELF ADMINISTERED DRUGS (ALT 637 FOR MEDICARE OP): Performed by: NURSE PRACTITIONER

## 2023-11-28 PROCEDURE — 85027 COMPLETE CBC AUTOMATED: CPT | Performed by: NURSE PRACTITIONER

## 2023-11-28 PROCEDURE — 80048 BASIC METABOLIC PNL TOTAL CA: CPT | Performed by: NURSE PRACTITIONER

## 2023-11-28 PROCEDURE — 1100000001 HC PRIVATE ROOM DAILY

## 2023-11-28 PROCEDURE — 2500000001 HC RX 250 WO HCPCS SELF ADMINISTERED DRUGS (ALT 637 FOR MEDICARE OP): Performed by: SPECIALIST

## 2023-11-28 PROCEDURE — 82947 ASSAY GLUCOSE BLOOD QUANT: CPT

## 2023-11-28 RX ORDER — ONDANSETRON HYDROCHLORIDE 2 MG/ML
4 INJECTION, SOLUTION INTRAVENOUS EVERY 6 HOURS PRN
Status: DISCONTINUED | OUTPATIENT
Start: 2023-11-28 | End: 2023-12-01 | Stop reason: HOSPADM

## 2023-11-28 RX ORDER — AMLODIPINE BESYLATE 10 MG/1
10 TABLET ORAL DAILY
Qty: 90 TABLET | Refills: 0 | Status: SHIPPED | OUTPATIENT
Start: 2023-11-28 | End: 2024-01-17 | Stop reason: SDUPTHER

## 2023-11-28 RX ORDER — INSULIN LISPRO 100 [IU]/ML
0-10 INJECTION, SOLUTION INTRAVENOUS; SUBCUTANEOUS
Status: DISCONTINUED | OUTPATIENT
Start: 2023-11-28 | End: 2023-12-01 | Stop reason: HOSPADM

## 2023-11-28 RX ORDER — OXYCODONE AND ACETAMINOPHEN 5; 325 MG/1; MG/1
2 TABLET ORAL EVERY 4 HOURS PRN
Status: DISCONTINUED | OUTPATIENT
Start: 2023-11-28 | End: 2023-12-01 | Stop reason: HOSPADM

## 2023-11-28 RX ORDER — TRAMADOL HYDROCHLORIDE 50 MG/1
50 TABLET ORAL EVERY 6 HOURS PRN
Status: DISCONTINUED | OUTPATIENT
Start: 2023-11-28 | End: 2023-12-01 | Stop reason: HOSPADM

## 2023-11-28 RX ORDER — LOSARTAN POTASSIUM AND HYDROCHLOROTHIAZIDE 12.5; 5 MG/1; MG/1
1 TABLET ORAL DAILY
Qty: 90 TABLET | Refills: 0 | Status: SHIPPED | OUTPATIENT
Start: 2023-11-28 | End: 2024-01-17 | Stop reason: SDUPTHER

## 2023-11-28 RX ORDER — SERTRALINE HYDROCHLORIDE 100 MG/1
100 TABLET, FILM COATED ORAL 2 TIMES DAILY
Qty: 180 TABLET | Refills: 0 | Status: SHIPPED | OUTPATIENT
Start: 2023-11-28 | End: 2024-01-17 | Stop reason: SDUPTHER

## 2023-11-28 RX ORDER — IBUPROFEN 600 MG/1
600 TABLET ORAL 4 TIMES DAILY
Status: DISCONTINUED | OUTPATIENT
Start: 2023-11-28 | End: 2023-12-01 | Stop reason: HOSPADM

## 2023-11-28 RX ORDER — POTASSIUM CHLORIDE 20 MEQ/1
20 TABLET, EXTENDED RELEASE ORAL 2 TIMES DAILY
Qty: 180 TABLET | Refills: 0 | Status: SHIPPED | OUTPATIENT
Start: 2023-11-28 | End: 2024-01-17 | Stop reason: SDUPTHER

## 2023-11-28 RX ADMIN — OXYCODONE AND ACETAMINOPHEN 1 TABLET: 5; 325 TABLET ORAL at 00:44

## 2023-11-28 RX ADMIN — IBUPROFEN 600 MG: 600 TABLET ORAL at 17:21

## 2023-11-28 RX ADMIN — OXYCODONE AND ACETAMINOPHEN 2 TABLET: 5; 325 TABLET ORAL at 04:43

## 2023-11-28 RX ADMIN — OXYCODONE HYDROCHLORIDE AND ACETAMINOPHEN 250 MG: 500 TABLET ORAL at 09:07

## 2023-11-28 RX ADMIN — CEFEPIME 2 G: 2 INJECTION, POWDER, FOR SOLUTION INTRAVENOUS at 15:35

## 2023-11-28 RX ADMIN — INSULIN LISPRO 3 UNITS: 100 INJECTION, SOLUTION INTRAVENOUS; SUBCUTANEOUS at 09:11

## 2023-11-28 RX ADMIN — CEFEPIME 2 G: 2 INJECTION, POWDER, FOR SOLUTION INTRAVENOUS at 00:46

## 2023-11-28 RX ADMIN — HYDROMORPHONE HYDROCHLORIDE 0.2 MG: 1 INJECTION, SOLUTION INTRAMUSCULAR; INTRAVENOUS; SUBCUTANEOUS at 01:48

## 2023-11-28 RX ADMIN — Medication 38 MG OF IRON: at 09:08

## 2023-11-28 RX ADMIN — ONDANSETRON 4 MG: 2 INJECTION INTRAMUSCULAR; INTRAVENOUS at 13:57

## 2023-11-28 RX ADMIN — OXYCODONE HYDROCHLORIDE AND ACETAMINOPHEN 250 MG: 500 TABLET ORAL at 20:27

## 2023-11-28 RX ADMIN — ATORVASTATIN CALCIUM 20 MG: 20 TABLET, FILM COATED ORAL at 20:27

## 2023-11-28 RX ADMIN — SERTRALINE 100 MG: 100 TABLET, FILM COATED ORAL at 20:27

## 2023-11-28 RX ADMIN — AMLODIPINE BESYLATE 10 MG: 10 TABLET ORAL at 09:07

## 2023-11-28 RX ADMIN — IBUPROFEN 600 MG: 600 TABLET ORAL at 06:32

## 2023-11-28 RX ADMIN — TRAMADOL HYDROCHLORIDE 50 MG: 50 TABLET ORAL at 15:33

## 2023-11-28 RX ADMIN — Medication 38 MG OF IRON: at 17:21

## 2023-11-28 RX ADMIN — CEFEPIME 2 G: 2 INJECTION, POWDER, FOR SOLUTION INTRAVENOUS at 23:56

## 2023-11-28 RX ADMIN — HYDROCHLOROTHIAZIDE 12.5 MG: 12.5 CAPSULE ORAL at 09:07

## 2023-11-28 RX ADMIN — INSULIN LISPRO 2 UNITS: 100 INJECTION, SOLUTION INTRAVENOUS; SUBCUTANEOUS at 12:12

## 2023-11-28 RX ADMIN — SERTRALINE 100 MG: 100 TABLET, FILM COATED ORAL at 09:08

## 2023-11-28 RX ADMIN — DAPTOMYCIN 400 MG: 500 INJECTION, POWDER, LYOPHILIZED, FOR SOLUTION INTRAVENOUS at 13:57

## 2023-11-28 RX ADMIN — GABAPENTIN 400 MG: 400 CAPSULE ORAL at 20:27

## 2023-11-28 RX ADMIN — GABAPENTIN 400 MG: 400 CAPSULE ORAL at 15:33

## 2023-11-28 RX ADMIN — IBUPROFEN 600 MG: 600 TABLET ORAL at 20:27

## 2023-11-28 RX ADMIN — CEFEPIME 2 G: 2 INJECTION, POWDER, FOR SOLUTION INTRAVENOUS at 09:17

## 2023-11-28 RX ADMIN — HYDROMORPHONE HYDROCHLORIDE 0.2 MG: 1 INJECTION, SOLUTION INTRAMUSCULAR; INTRAVENOUS; SUBCUTANEOUS at 09:17

## 2023-11-28 RX ADMIN — GABAPENTIN 400 MG: 400 CAPSULE ORAL at 09:06

## 2023-11-28 RX ADMIN — IBUPROFEN 600 MG: 600 TABLET ORAL at 12:11

## 2023-11-28 RX ADMIN — OXYCODONE AND ACETAMINOPHEN 2 TABLET: 5; 325 TABLET ORAL at 19:38

## 2023-11-28 RX ADMIN — OXYCODONE AND ACETAMINOPHEN 1 TABLET: 5; 325 TABLET ORAL at 12:11

## 2023-11-28 RX ADMIN — HYDROMORPHONE HYDROCHLORIDE 0.2 MG: 1 INJECTION, SOLUTION INTRAMUSCULAR; INTRAVENOUS; SUBCUTANEOUS at 06:32

## 2023-11-28 RX ADMIN — LOSARTAN POTASSIUM 50 MG: 50 TABLET, FILM COATED ORAL at 09:07

## 2023-11-28 ASSESSMENT — PAIN SCALES - GENERAL
PAINLEVEL_OUTOF10: 4
PAINLEVEL_OUTOF10: 8
PAINLEVEL_OUTOF10: 6
PAINLEVEL_OUTOF10: 8
PAINLEVEL_OUTOF10: 4
PAINLEVEL_OUTOF10: 0 - NO PAIN
PAINLEVEL_OUTOF10: 7
PAINLEVEL_OUTOF10: 9
PAINLEVEL_OUTOF10: 5 - MODERATE PAIN
PAINLEVEL_OUTOF10: 8
PAINLEVEL_OUTOF10: 0 - NO PAIN
PAINLEVEL_OUTOF10: 8
PAINLEVEL_OUTOF10: 9
PAINLEVEL_OUTOF10: 7
PAINLEVEL_OUTOF10: 5 - MODERATE PAIN
PAINLEVEL_OUTOF10: 8

## 2023-11-28 ASSESSMENT — PAIN - FUNCTIONAL ASSESSMENT

## 2023-11-28 ASSESSMENT — COGNITIVE AND FUNCTIONAL STATUS - GENERAL
DRESSING REGULAR UPPER BODY CLOTHING: A LITTLE
DAILY ACTIVITIY SCORE: 23
CLIMB 3 TO 5 STEPS WITH RAILING: A LITTLE
MOBILITY SCORE: 23

## 2023-11-28 ASSESSMENT — PAIN DESCRIPTION - LOCATION
LOCATION: BREAST

## 2023-11-28 ASSESSMENT — PAIN DESCRIPTION - ORIENTATION: ORIENTATION: LEFT

## 2023-11-28 NOTE — DOCUMENTATION CLARIFICATION NOTE
"    PATIENT:               LEEANN GARCIA  ACCT #:                  [id number]  MRN:                       [id number]  :                       1965  ADMIT DATE:       10/31/2023 1:31 PM  DISCH DATE:        11/3/2023 3:38 PM  RESPONDING PROVIDER #:        [id number]          PROVIDER RESPONSE TEXT:    Excisional debridement down to and including subcutaneous tissue and fascia    CDI QUERY TEXT:    [facility]_Debridement    Clinical Information: This query refers to the procedure performed on 23 and documented as Debridement    Instruction:    Based on your assessment of the patient and the clinical information, please provide the requested documentation by clicking on the appropriate radio button and enter any additional information if prompted.    Question: Please further clarify the debridement procedure as      When answering this query, please exercise your independent professional judgment. The fact that a question is being asked, does not imply that any particular answer is desired or expected.    The patient's clinical indicators include:  Clinical Indicators    - Operative Report- \"58 year old female having surgery for abscess of left breast with cellulitis\".    Procedure - \"Debridement of the triple point was undertaken with 10 blade knife. The space of the nipple areolar complex was debrided sharply with a 10 blade knife until vascularized subcutaneous tissue was encountered\".  Options provided:  -- Excisional debridement to and including skin  -- Excisional debridement down to and including subcutaneous tissue and fascia  -- Non-excisional debridement to and including skin  -- Non-excisional debridement down to and including subcutaneous tissue and fascia  -- Other - I will add my own diagnosis  -- Refer to Clinical Documentation Reviewer    Query created by: Anneliese Regan on 2023 8:45 AM      Electronically signed by:  JAVY CASTELLANOS MD 2023 1:49 PM          "

## 2023-11-28 NOTE — CARE PLAN
The patient's goals for the shift include  manage pain    The clinical goals for the shift include pain management, no N/V, work with PT/OT, ambulate    No barriers to meeting these goals.       Problem: Diabetes  Goal: Achieve decreasing blood glucose levels by end of shift  Outcome: Progressing  Goal: Increase stability of blood glucose readings by end of shift  Outcome: Progressing  Goal: Decrease in ketones present in urine by end of shift  Outcome: Progressing  Goal: Maintain electrolyte levels within acceptable range throughout shift  Outcome: Progressing  Goal: Maintain glucose levels >70mg/dl to <250mg/dl throughout shift  Outcome: Progressing  Goal: No changes in neurological exam by end of shift  Outcome: Progressing  Goal: Learn about and adhere to nutrition recommendations by end of shift  Outcome: Progressing  Goal: Vital signs within normal range for age by end of shift  Outcome: Progressing  Goal: Increase self care and/or family involovement by end of shift  Outcome: Progressing  Goal: Receive DSME education by end of shift  Outcome: Progressing     Problem: Skin  Goal: Decreased wound size/increased tissue granulation at next dressing change  Outcome: Progressing  Goal: Participates in plan/prevention/treatment measures  Outcome: Progressing  Goal: Prevent/manage excess moisture  Outcome: Progressing  Goal: Prevent/minimize sheer/friction injuries  Outcome: Progressing  Goal: Promote/optimize nutrition  Outcome: Progressing  Goal: Promote skin healing  Outcome: Progressing     Problem: Pain  Goal: My pain/discomfort is manageable  Outcome: Progressing     Problem: Safety  Goal: Patient will be injury free during hospitalization  Outcome: Progressing  Goal: I will remain free of falls  Outcome: Progressing     Problem: Daily Care  Goal: Daily care needs are met  Outcome: Progressing     Problem: Psychosocial Needs  Goal: Demonstrates ability to cope with hospitalization/illness  Outcome:  Progressing  Goal: Collaborate with me, my family, and caregiver to identify my specific goals  Outcome: Progressing     Problem: Discharge Barriers  Goal: My discharge needs are met  Outcome: Progressing     Problem: Pain  Goal: Takes deep breaths with improved pain control throughout the shift  Outcome: Progressing  Goal: Turns in bed with improved pain control throughout the shift  Outcome: Progressing  Goal: Walks with improved pain control throughout the shift  Outcome: Progressing  Goal: Performs ADL's with improved pain control throughout shift  Outcome: Progressing  Goal: Participates in PT with improved pain control throughout the shift  Outcome: Progressing  Goal: Free from opioid side effects throughout the shift  Outcome: Progressing  Goal: Free from acute confusion related to pain meds throughout the shift  Outcome: Progressing

## 2023-11-28 NOTE — PROGRESS NOTES
Sabrina Michaud is a 58 y.o. female on day 3 of admission presenting with Cellulitis.    Subjective   Interval History:   Afebrile, no chills  S/p Left breast irrigation , debridement, implant explant yesterday  Reports left breast pain  Denies shortness of breath  No nausea or vomiting        Objective   Range of Vitals (last 24 hours)  Heart Rate:  [75-92]   Temp:  [36 °C (96.8 °F)-37 °C (98.6 °F)]   Resp:  [15-19]   BP: (103-144)/(57-96)   SpO2:  [93 %-99 %]   Daily Weight  11/25/23 : 85.3 kg (188 lb 0.8 oz)    Body mass index is 33.32 kg/m².    Physical Exam  onstitutional:       Appearance: Normal appearance.   HENT:      Head: Normocephalic and atraumatic.      Nose: Nose normal.      Mouth/Throat:      Mouth: Mucous membranes are moist.      Pharynx: Oropharynx is clear.   Eyes:      Extraocular Movements: Extraocular movements intact.      Conjunctiva/sclera: Conjunctivae normal.   Cardiovascular:      Rate and Rhythm: Normal rate and regular rhythm.   Pulmonary:      Effort: Pulmonary effort is normal.      Breath sounds: Normal breath sounds.   Abdominal:      General: Bowel sounds are normal.      Palpations: Abdomen is soft.   Musculoskeletal:         General: Normal range of motion.      Cervical back: Normal range of motion and neck supple.   Skin:     Comments:  Lower Left breast erythema, induration, tenderness, no drainage noted  Neurological:      General: No focal deficit present.      Mental Status: She is alert and oriented to person, place, and time. Mental status is at baseline.   Psychiatric:         Mood and Affect: Mood normal.         Behavior: Behavior normal.     Antibiotics  DAPTOmycin (Cubicin) 500 mg/50 mL  mg  cefepime (Maxipime) 2 g in dextrose 5 % 100 mL IV  amLODIPine (Norvasc) tablet 10 mg  ascorbic acid (Vitamin C) tablet 250 mg  atorvastatin (Lipitor) tablet 20 mg  ferrous gluconate (Fergon) 324 (38 Fe) mg tablet 38 mg of iron  gabapentin (Neurontin) capsule 400  mg  losartan 50 mg, hydroCHLOROthiazide 12.5 mg for Hyzaar 50/12.5  metoprolol succinate XL (Toprol-XL) 24 hr tablet 50 mg  sertraline (Zoloft) tablet 100 mg  sitaGLIPtin phos-metformin (Janumet)  mg per tablet 1 tablet  enoxaparin (Lovenox) syringe 40 mg  acetaminophen (Tylenol) tablet 650 mg  acetaminophen (Tylenol) tablet 650 mg  polyethylene glycol (Glycolax, Miralax) packet 17 g  benzocaine-menthol (Cepastat Sore Throat) 15-3.6 mg lozenge 1 lozenge  dextromethorphan-guaifenesin (Robitussin DM)  mg/5 mL oral liquid 5 mL  dextrose 50 % injection 25 g  glucagon (Glucagen) injection 1 mg  dextrose 10 % in water (D10W) infusion  insulin lispro (HumaLOG) injection 0-5 Units  cefepime (Maxipime) 2 g in dextrose 5 % 100 mL IV  DAPTOmycin (Cubicin) 500 mg/50 mL  mg  iohexol (OMNIPaque) 350 mg iodine/mL solution 75 mL  enoxaparin (Lovenox) syringe 40 mg  losartan (Cozaar) tablet 50 mg  hydroCHLOROthiazide (Microzide) capsule 12.5 mg  dextrose 5 % and lactated Ringer's infusion  celecoxib (CeleBREX) capsule 400 mg  acetaminophen (Tylenol) tablet 650 mg  ondansetron ODT (Zofran-ODT) disintegrating tablet 4 mg  chlorhexidine (Hibiclens) 4 % liquid 1 Application  DAPTOmycin (Cubicin) 400 mg in sodium chloride 0.9% 50 mL IV      Relevant Results  Labs  Results from last 72 hours   Lab Units 11/28/23  0430 11/27/23  0431 11/26/23  0423 11/25/23  1446   WBC AUTO x10*3/uL 5.7 5.4 5.3 5.8   HEMOGLOBIN g/dL 10.9* 11.6* 11.4* 11.5*   HEMATOCRIT % 34.9* 36.3 35.1* 34.9*   PLATELETS AUTO x10*3/uL 92* 103* 97* 100*   NEUTROS PCT AUTO %  --   --   --  62.3   LYMPHS PCT AUTO %  --   --   --  25.1   MONOS PCT AUTO %  --   --   --  8.3   EOS PCT AUTO %  --   --   --  3.6       Results from last 72 hours   Lab Units 11/28/23  0430 11/27/23  0431 11/26/23  0423   SODIUM mmol/L 138 139 140   POTASSIUM mmol/L 3.9 3.5 3.7   CHLORIDE mmol/L 107 105 105   CO2 mmol/L 22* 23* 24   BUN mg/dL 14 15 15   CREATININE mg/dL 0.50 0.50  0.40   GLUCOSE mg/dL 117* 120* 121*   CALCIUM mg/dL 8.9 9.5 9.3   ANION GAP mmol/L 9 11 11   EGFR mL/min/1.73m*2 >90 >90 >90       Results from last 72 hours   Lab Units 11/26/23  0423 11/25/23  1446   ALK PHOS U/L 159* 168*   BILIRUBIN TOTAL mg/dL 0.9 0.7   PROTEIN TOTAL g/dL 7.4 8.0*   ALT U/L 27 30   AST U/L 40 47*   ALBUMIN g/dL 4.1 4.4       Estimated Creatinine Clearance: 125 mL/min (by C-G formula based on SCr of 0.5 mg/dL).  CRP   Date Value Ref Range Status   06/01/2022 1.02 (A) mg/dL Final     Comment:     REF VALUE  < 1.00     05/31/2022 0.77 mg/dL Final     Comment:     REF VALUE  < 1.00     05/20/2021 1.21 (A) mg/dL Final     Comment:     REF VALUE  < 1.00       Microbiology  No results found for the last 14 days.  Blood culture pending    Imaging  CT chest w IV contrast    Result Date: 11/25/2023  Interpreted By:  Walker Figueroa, STUDY: CT CHEST W IV CONTRAST; 11/25/2023 7:25 pm   INDICATION: Signs/Symptoms:abscess. Redness and warmth left breast. Patient has had 2 prior surgeries for infection previous mastectomy, reconstruction site.   COMPARISON: 08/17/2022   ACCESSION NUMBER(S): [id number]   ORDERING CLINICIAN: YANNA CHAMORRO   TECHNIQUE: The examination was performed with the intravenous injection of 75 ml of non-ionic iodinated contrast was injected intravenously.   A helical acquisition data was obtained with sagittal coronal reconstructions performed.   One or more of the following dose reduction techniques were used: Automated exposure control Adjustment of the mA and/or kV according to patient size, and/or use of iterative reconstruction technique.   FINDINGS: There is no hilar or mediastinal lymphadenopathy present.  No infiltrates or effusions are identified.   Chest Wall: There are bilateral breast implants present. There is no evidence for a chest wall abscess. There is a hazy appearance of the fat surrounding the implants bilaterally, nonspecific in appearance. It is unclear whether  these findings are postsurgical in nature or whether the air indicative of underlying infection. Cort[place]-implant haziness is slightly more pronounced on the left compared to the right, particularly inferiorly and medially.   There is a 2.2 cm rounded lesion with fluid attenuation within the left axilla possibly representing a small residual postoperative seroma/hematoma from axillary lymph node resection. Abscess is not entirely excluded although there is no surrounding haziness of the adjacent fat making this somewhat less likely.   Upper Abdominal Findings: There is a nodular appearance of the hepatic contour consistent with cirrhosis. There is recanalization of the umbilical vein consistent with portal venous hypertension. Splenomegaly is also partially included within the field of view, consistent with portal venous hypertension. There are few too small to characterize hypodense foci within the right and left lobes of the liver most likely representing cysts with the largest measuring 1.4 cm within the left lobe. These are unchanged compared to the prior study from 08/17/2022.   Skeletal System: No fractures or destructive lesions are identified.       1. There is a small, 2.2 cm rounded area of fluid attenuation within the left axilla possibly representing a small hematoma/seroma from axillary lymph node resection. 2. Bilateral breast implants are present with haziness of the surrounding fat with the haziness slightly more pronounced surrounding the left breast implant. A discrete abscess is not identified adjacent to either implant. 3. Cirrhosis with portal venous hypertension and splenomegaly as noted above.   MACRO: none   Signed by: Walker Figueroa 11/25/2023 8:04 PM Dictation workstation:   [id number]    ECG 12 lead    Result Date: 10/31/2023  Normal sinus rhythm Normal ECG When compared with ECG of 17-AUG-2022 08:45, Previous ECG has undetermined rhythm, needs review QT has shortened Confirmed by Quiroga,  Herson ([id number]) on 10/31/2023 4:04:56 PM    BI US breast limited left    Result Date: 10/31/2023  Interpreted By:  Lucien Martínez, STUDY: BI US BREAST LIMITED LEFT; 10/31/2023 1:58 pm   INDICATION: Signs/Symptoms:possible infection. Rule out abscess.   COMPARISON: Mammograms from May and June 2023   ACCESSION NUMBER(S): [id number]   ORDERING CLINICIAN: NEDRA DING   TECHNIQUE: Limited real-time grayscale and color Doppler imaging of the  breast was performed.   FINDINGS: Patient has previous mastectomy and reconstruction surgery. Elongated fluid collection is seen anteriorly to the left breast implant, measuring 6.4 by 0.5 by 3.7 cm. On the Doppler images there is no associated surrounding hyperemia.       Elongated fluid collection adjacent to the left breast implant, without associated surrounding hyperemia, most likely postsurgical seroma. No definite drainable abscess visualized.   ACR BI-RADS 2:  Benign finding.   Correlate clinically and follow-up as needed.   MACRO: None.   Signed by: Lucien Martínez 10/31/2023 2:16 PM Dictation workstation:   [id number]        Assessment/Plan   Left breast cellulitis  Left breast prosthetic implant- infected  Diabetes Mellitus  Previous culture with pseudomonas, Corynebacterium-possible contaminant  Allergy listed to vancomycin, zosyn     IV Cefepime  IV daptomycin  Follow up culture  Follow up intraoperative findings  Monitor temp and WBC  Local care  Plastic surgery plan OR on 11/27  Further recommendation based on culture/intraoperative findings       Discussed IV vancomycin with patient-reaction was head down redness.  She has never tried pre treatment with benadryl. She is will to try IV vancomycin with pretreatment with benadryl if warranted.     Angelica Philippe, APRN-CNP

## 2023-11-28 NOTE — PROGRESS NOTES
Likely, home with IV antibiotics.  Has had them in the past.  Waiting on cultures.  Referral to shannon Cortes, for [facility] planning.  Sophie Gallardo RN

## 2023-11-28 NOTE — PROGRESS NOTES
"Sabrina Michaud is a 58 y.o. female on day 3 of admission presenting with Cellulitis.    Subjective   I would like to stay longer as I am having a lot of pain       Objective Prevena VAC in good position.  JIL drain serosanguineous.  No untoward drainage.  Incisions covered by VAC.  Abdomen benign lungs clear.  Calves supple.  Patient rates her pain too high to go home with today.  Logistics for ID antibiotics etc. per medical team, discussed yesterday. i reviewed with nurse at bedside and nurse manager.    Physical Exam    Last Recorded Vitals  Blood pressure 144/70, pulse 80, temperature 37 °C (98.6 °F), temperature source Temporal, resp. rate 16, height 1.6 m (5' 2.99\"), weight 85.3 kg (188 lb 0.8 oz), SpO2 96 %.  Intake/Output last 3 Shifts:  I/O last 3 completed shifts:  In: 1900 (22.3 mL/kg) [I.V.:1700 (19.9 mL/kg); IV Piggyback:200]  Out: 1190 (14 mL/kg) [Urine:1100 (0.4 mL/kg/hr); Drains:40; Blood:50]  Weight: 85.3 kg     Relevant Results                               Assessment/Plan   Principal Problem:    Cellulitis  Active Problems:    Benign essential hypertension    Diabetes mellitus type 2 in obese (CMS/HCC)    Dyslipidemia    Cellulitis of left breast    Seroma of breast    History of breast cancer  Discussed with the patientI discussed with the patient the way forward after explantation of this implant reconstruction.  In some weeks from now she can return to the OR with the hopes of really elevating her [place] flaps and going extra pectoral dissection plane for prepectoral breast reconstruction.  Depending upon her soft tissue healing the timing of this procedure can be a few weeks from now.  She will require short course of IV versus oral antibiotics determined by the ID consultant as the fluid was serous.  The fluid culture from IntraOp yesterday is pending.  If she should stay I will see her tomorrow           I spent 22 minutes in the professional and overall care of this patient.      Tim MCKEON" MD Dandy

## 2023-11-28 NOTE — CARE PLAN
The patient's goals for the shift include  NPO, encourage ambulation     The clinical goals for the shift include NPO, encourage ambulation    Over the shift, the patient did not make progress toward the following goals. Barriers to progression include none. Recommendations to address these barriers include none.      11/27/23 at 10:14 PM - COSTA OH RN

## 2023-11-28 NOTE — PROGRESS NOTES
Sabrina Michaud is a 58 y.o. female on day 3 of admission presenting with Cellulitis.      Subjective   Patient seen and examined. Resting in bed. States her pain was significant this AM, a little better now since medicated. Denies any SOB or fevers.        Objective     Last Recorded Vitals  /68 (BP Location: Left arm, Patient Position: Lying)   Pulse 75   Temp 36.3 °C (97.4 °F) (Oral)   Resp 16   Wt 85.3 kg (188 lb 0.8 oz)   SpO2 94%   Intake/Output last 3 Shifts:    Intake/Output Summary (Last 24 hours) at 11/28/2023 1041  Last data filed at 11/28/2023 0754  Gross per 24 hour   Intake 1800 ml   Output 1190 ml   Net 610 ml       Admission Weight  Weight: 85.3 kg (188 lb 0.8 oz) (11/25/23 1413)    Daily Weight  11/25/23 : 85.3 kg (188 lb 0.8 oz)    Image Results    No imaging to review over the past 24 hours.     Physical Exam    General: Alert and oriented x3, pleasant.   Cardiac: Regular rate and rhythm, S1/S2 , no murmur.   Pulmonary: Clear to auscultation on room air.   Abdomen: Soft, round, nontender. BS +x4.   Extremities: No edema.  Skin: No rashes or lesions. L breast wound vac in place, 2 JIL drains with serosang drainage present.      Relevant Results    Scheduled medications  amLODIPine, 10 mg, oral, Daily  ascorbic acid, 250 mg, oral, BID  atorvastatin, 20 mg, oral, Nightly  cefepime, 2 g, intravenous, q8h  daptomycin, 6 mg/kg (Adjusted), intravenous, q24h JM  [Held by provider] enoxaparin, 40 mg, subcutaneous, q24h  ferrous gluconate, 38 mg of iron, oral, BID with meals  gabapentin, 400 mg, oral, TID  losartan, 50 mg, oral, Daily   And  hydroCHLOROthiazide, 12.5 mg, oral, Daily  ibuprofen, 600 mg, oral, 4x daily  insulin lispro, 0-5 Units, subcutaneous, TID with meals  metoprolol succinate XL, 50 mg, oral, Daily  sertraline, 100 mg, oral, BID  [Held by provider] sitaGLIPtin phos-metformin, 1 tablet, oral, BID with meals      Continuous medications     PRN medications  PRN medications:  benzocaine-menthol, dextromethorphan-guaifenesin, dextrose 10 % in water (D10W), dextrose, glucagon, HYDROmorphone, oxyCODONE-acetaminophen, oxyCODONE-acetaminophen, polyethylene glycol, traMADol     Results for orders placed or performed during the hospital encounter of 11/25/23 (from the past 24 hour(s))   POCT GLUCOSE   Result Value Ref Range    POCT Glucose 121 (H) 74 - 99 mg/dL   Tissue/Wound Culture/Smear    Specimen: BREAST CAPSULE LEFT; Swab   Result Value Ref Range    Gram Stain No polymorphonuclear leukocytes seen     Gram Stain No organisms seen    POCT GLUCOSE   Result Value Ref Range    POCT Glucose 96 74 - 99 mg/dL   CBC   Result Value Ref Range    WBC 5.7 4.4 - 11.3 x10*3/uL    nRBC 0.0 0.0 - 0.0 /100 WBCs    RBC 3.47 (L) 4.00 - 5.20 x10*6/uL    Hemoglobin 10.9 (L) 12.0 - 16.0 g/dL    Hematocrit 34.9 (L) 36.0 - 46.0 %     (H) 80 - 100 fL    MCH 31.4 26.0 - 34.0 pg    MCHC 31.2 (L) 32.0 - 36.0 g/dL    RDW 13.6 11.5 - 14.5 %    Platelets 92 (L) 150 - 450 x10*3/uL   Basic Metabolic Panel   Result Value Ref Range    Glucose 117 (H) 65 - 99 mg/dL    Sodium 138 133 - 145 mmol/L    Potassium 3.9 3.4 - 5.1 mmol/L    Chloride 107 97 - 107 mmol/L    Bicarbonate 22 (L) 24 - 31 mmol/L    Urea Nitrogen 14 8 - 25 mg/dL    Creatinine 0.50 0.40 - 1.60 mg/dL    eGFR >90 >60 mL/min/1.73m*2    Calcium 8.9 8.5 - 10.4 mg/dL    Anion Gap 9 <=19 mmol/L   POCT GLUCOSE   Result Value Ref Range    POCT Glucose 256 (H) 74 - 99 mg/dL        Assessment/Plan      Cellulitis/infection left breast implant  -History of breast cancer s/p bilateral mastectomy with reconstruction   -Dr. Dandy garland.   -POD #1 implant removal   -Pain control  -Wound care per plastics  -Continue IV Abx per ID- currently on daptomycin and cefepime  -Intra op cultures pending  -ID follows, will see if she will need IV or PO abx at DC          -CT shows 2.2 cm area of fluid retention left axilla, possibly small hematoma/seroma. No abscess identified                 Benign essential hypertension  -Continue home medications   -BP stable, monitor     Diabetes mellitus   -Holding Janumet and Ozempic for now   -AC/HS glucose with SSI coverage, will increase the SS today as her sugars are rising   -Hypoglycemia protocol       -Monitor blood sugars            Dyslipidemia  -Continue statin     DVT Risk  -Lovenox currently on hold, plt only in the 90's, plan for her to go home with lovenox per my disucssion with Dr. Dorman.   -SCDs.      Plan  ID and Plastics on board.   Continue IV abx- currently on daptomycin and cefepime.   POD #1 implant removal. Wound vac in place.  Pain control, significant pain this morning.   Has been on IV abx in the past for the same issue, will see what abx plan for DC is pending ID.   SS consult for DC planning assistance in case IV abx needed. Will also be going home with drains and wound vac.           SNOW Metz-CNP

## 2023-11-29 ENCOUNTER — APPOINTMENT (OUTPATIENT)
Dept: WOUND CARE | Facility: HOSPITAL | Age: 58
End: 2023-11-29
Payer: COMMERCIAL

## 2023-11-29 LAB
ANION GAP SERPL CALC-SCNC: 9 MMOL/L
BUN SERPL-MCNC: 16 MG/DL (ref 8–25)
CALCIUM SERPL-MCNC: 9.2 MG/DL (ref 8.5–10.4)
CHLORIDE SERPL-SCNC: 105 MMOL/L (ref 97–107)
CO2 SERPL-SCNC: 24 MMOL/L (ref 24–31)
CREAT SERPL-MCNC: 0.6 MG/DL (ref 0.4–1.6)
ERYTHROCYTE [DISTWIDTH] IN BLOOD BY AUTOMATED COUNT: 13.8 % (ref 11.5–14.5)
GFR SERPL CREATININE-BSD FRML MDRD: >90 ML/MIN/1.73M*2
GLUCOSE BLD MANUAL STRIP-MCNC: 102 MG/DL (ref 74–99)
GLUCOSE BLD MANUAL STRIP-MCNC: 124 MG/DL (ref 74–99)
GLUCOSE BLD MANUAL STRIP-MCNC: 127 MG/DL (ref 74–99)
GLUCOSE BLD MANUAL STRIP-MCNC: 209 MG/DL (ref 74–99)
GLUCOSE SERPL-MCNC: 141 MG/DL (ref 65–99)
HCT VFR BLD AUTO: 34.2 % (ref 36–46)
HGB BLD-MCNC: 10.8 G/DL (ref 12–16)
MCH RBC QN AUTO: 31.3 PG (ref 26–34)
MCHC RBC AUTO-ENTMCNC: 31.6 G/DL (ref 32–36)
MCV RBC AUTO: 99 FL (ref 80–100)
NRBC BLD-RTO: 0 /100 WBCS (ref 0–0)
PLATELET # BLD AUTO: 77 X10*3/UL (ref 150–450)
POTASSIUM SERPL-SCNC: 3.6 MMOL/L (ref 3.4–5.1)
RBC # BLD AUTO: 3.45 X10*6/UL (ref 4–5.2)
SODIUM SERPL-SCNC: 138 MMOL/L (ref 133–145)
WBC # BLD AUTO: 4.4 X10*3/UL (ref 4.4–11.3)

## 2023-11-29 PROCEDURE — 36415 COLL VENOUS BLD VENIPUNCTURE: CPT | Performed by: NURSE PRACTITIONER

## 2023-11-29 PROCEDURE — 2500000001 HC RX 250 WO HCPCS SELF ADMINISTERED DRUGS (ALT 637 FOR MEDICARE OP): Performed by: SPECIALIST

## 2023-11-29 PROCEDURE — 2500000004 HC RX 250 GENERAL PHARMACY W/ HCPCS (ALT 636 FOR OP/ED): Performed by: NURSE PRACTITIONER

## 2023-11-29 PROCEDURE — 2780000003 HC OR 278 NO HCPCS

## 2023-11-29 PROCEDURE — 96372 THER/PROPH/DIAG INJ SC/IM: CPT | Performed by: NURSE PRACTITIONER

## 2023-11-29 PROCEDURE — 2500000001 HC RX 250 WO HCPCS SELF ADMINISTERED DRUGS (ALT 637 FOR MEDICARE OP)

## 2023-11-29 PROCEDURE — 2500000002 HC RX 250 W HCPCS SELF ADMINISTERED DRUGS (ALT 637 FOR MEDICARE OP, ALT 636 FOR OP/ED): Performed by: NURSE PRACTITIONER

## 2023-11-29 PROCEDURE — 82947 ASSAY GLUCOSE BLOOD QUANT: CPT

## 2023-11-29 PROCEDURE — 80048 BASIC METABOLIC PNL TOTAL CA: CPT | Performed by: NURSE PRACTITIONER

## 2023-11-29 PROCEDURE — 2500000004 HC RX 250 GENERAL PHARMACY W/ HCPCS (ALT 636 FOR OP/ED): Performed by: INTERNAL MEDICINE

## 2023-11-29 PROCEDURE — C1751 CATH, INF, PER/CENT/MIDLINE: HCPCS

## 2023-11-29 PROCEDURE — 36569 INSJ PICC 5 YR+ W/O IMAGING: CPT

## 2023-11-29 PROCEDURE — 2500000001 HC RX 250 WO HCPCS SELF ADMINISTERED DRUGS (ALT 637 FOR MEDICARE OP): Performed by: NURSE PRACTITIONER

## 2023-11-29 PROCEDURE — 2500000001 HC RX 250 WO HCPCS SELF ADMINISTERED DRUGS (ALT 637 FOR MEDICARE OP): Performed by: INTERNAL MEDICINE

## 2023-11-29 PROCEDURE — 1100000001 HC PRIVATE ROOM DAILY

## 2023-11-29 PROCEDURE — 85027 COMPLETE CBC AUTOMATED: CPT | Performed by: NURSE PRACTITIONER

## 2023-11-29 RX ORDER — VANCOMYCIN 1.75 G/350ML
1250 INJECTION, SOLUTION INTRAVENOUS EVERY 12 HOURS
Status: DISCONTINUED | OUTPATIENT
Start: 2023-11-29 | End: 2023-11-29

## 2023-11-29 RX ORDER — ENOXAPARIN SODIUM 100 MG/ML
40 INJECTION SUBCUTANEOUS DAILY
Status: DISCONTINUED | OUTPATIENT
Start: 2023-11-29 | End: 2023-12-01 | Stop reason: HOSPADM

## 2023-11-29 RX ORDER — FLUTICASONE PROPIONATE 50 MCG
2 SPRAY, SUSPENSION (ML) NASAL DAILY
Status: DISCONTINUED | OUTPATIENT
Start: 2023-11-29 | End: 2023-12-01 | Stop reason: HOSPADM

## 2023-11-29 RX ORDER — DIPHENHYDRAMINE HCL 25 MG
25 CAPSULE ORAL ONCE
Status: COMPLETED | OUTPATIENT
Start: 2023-11-29 | End: 2023-11-29

## 2023-11-29 RX ORDER — LORATADINE 10 MG/1
10 TABLET ORAL DAILY
Status: DISCONTINUED | OUTPATIENT
Start: 2023-11-29 | End: 2023-12-01 | Stop reason: HOSPADM

## 2023-11-29 RX ORDER — LIDOCAINE HYDROCHLORIDE 10 MG/ML
5 INJECTION INFILTRATION; PERINEURAL ONCE
Status: DISCONTINUED | OUTPATIENT
Start: 2023-11-29 | End: 2023-12-01 | Stop reason: HOSPADM

## 2023-11-29 RX ORDER — DIPHENHYDRAMINE HCL 25 MG
25 TABLET ORAL EVERY 12 HOURS
Status: DISCONTINUED | OUTPATIENT
Start: 2023-11-29 | End: 2023-11-30

## 2023-11-29 RX ADMIN — ATORVASTATIN CALCIUM 20 MG: 20 TABLET, FILM COATED ORAL at 21:11

## 2023-11-29 RX ADMIN — TRAMADOL HYDROCHLORIDE 50 MG: 50 TABLET ORAL at 00:05

## 2023-11-29 RX ADMIN — IBUPROFEN 600 MG: 600 TABLET ORAL at 13:17

## 2023-11-29 RX ADMIN — Medication 38 MG OF IRON: at 09:15

## 2023-11-29 RX ADMIN — OXYCODONE HYDROCHLORIDE AND ACETAMINOPHEN 250 MG: 500 TABLET ORAL at 09:11

## 2023-11-29 RX ADMIN — SERTRALINE 100 MG: 100 TABLET, FILM COATED ORAL at 09:15

## 2023-11-29 RX ADMIN — DIPHENHYDRAMINE HYDROCHLORIDE 25 MG: 25 TABLET ORAL at 23:40

## 2023-11-29 RX ADMIN — IBUPROFEN 600 MG: 600 TABLET ORAL at 21:11

## 2023-11-29 RX ADMIN — FLUTICASONE PROPIONATE 2 SPRAY: 50 SPRAY, METERED NASAL at 10:00

## 2023-11-29 RX ADMIN — VANCOMYCIN 1250 MG: 1.75 INJECTION, SOLUTION INTRAVENOUS at 13:16

## 2023-11-29 RX ADMIN — Medication 38 MG OF IRON: at 16:44

## 2023-11-29 RX ADMIN — ENOXAPARIN SODIUM 40 MG: 40 INJECTION SUBCUTANEOUS at 12:14

## 2023-11-29 RX ADMIN — LORATADINE 10 MG: 10 TABLET ORAL at 11:12

## 2023-11-29 RX ADMIN — SERTRALINE 100 MG: 100 TABLET, FILM COATED ORAL at 21:11

## 2023-11-29 RX ADMIN — CEFEPIME 2 G: 2 INJECTION, POWDER, FOR SOLUTION INTRAVENOUS at 23:39

## 2023-11-29 RX ADMIN — DIPHENHYDRAMINE HYDROCHLORIDE 25 MG: 25 TABLET ORAL at 16:44

## 2023-11-29 RX ADMIN — OXYCODONE AND ACETAMINOPHEN 2 TABLET: 5; 325 TABLET ORAL at 16:50

## 2023-11-29 RX ADMIN — GABAPENTIN 400 MG: 400 CAPSULE ORAL at 21:11

## 2023-11-29 RX ADMIN — GABAPENTIN 400 MG: 400 CAPSULE ORAL at 16:07

## 2023-11-29 RX ADMIN — IBUPROFEN 600 MG: 600 TABLET ORAL at 06:53

## 2023-11-29 RX ADMIN — GABAPENTIN 400 MG: 400 CAPSULE ORAL at 09:10

## 2023-11-29 RX ADMIN — INSULIN LISPRO 4 UNITS: 100 INJECTION, SOLUTION INTRAVENOUS; SUBCUTANEOUS at 12:15

## 2023-11-29 RX ADMIN — OXYCODONE AND ACETAMINOPHEN 2 TABLET: 5; 325 TABLET ORAL at 04:56

## 2023-11-29 RX ADMIN — HYDROCHLOROTHIAZIDE 12.5 MG: 12.5 CAPSULE ORAL at 09:16

## 2023-11-29 RX ADMIN — CEFEPIME 2 G: 2 INJECTION, POWDER, FOR SOLUTION INTRAVENOUS at 16:08

## 2023-11-29 RX ADMIN — OXYCODONE HYDROCHLORIDE AND ACETAMINOPHEN 250 MG: 500 TABLET ORAL at 21:11

## 2023-11-29 RX ADMIN — AMLODIPINE BESYLATE 10 MG: 10 TABLET ORAL at 09:11

## 2023-11-29 RX ADMIN — OXYCODONE AND ACETAMINOPHEN 2 TABLET: 5; 325 TABLET ORAL at 21:10

## 2023-11-29 RX ADMIN — CEFEPIME 2 G: 2 INJECTION, POWDER, FOR SOLUTION INTRAVENOUS at 09:16

## 2023-11-29 RX ADMIN — LOSARTAN POTASSIUM 50 MG: 50 TABLET, FILM COATED ORAL at 09:14

## 2023-11-29 RX ADMIN — TRAMADOL HYDROCHLORIDE 50 MG: 50 TABLET ORAL at 12:13

## 2023-11-29 RX ADMIN — DIPHENHYDRAMINE HYDROCHLORIDE 25 MG: 25 TABLET ORAL at 12:13

## 2023-11-29 RX ADMIN — METOPROLOL SUCCINATE 50 MG: 50 TABLET, EXTENDED RELEASE ORAL at 09:14

## 2023-11-29 ASSESSMENT — PAIN SCALES - GENERAL
PAINLEVEL_OUTOF10: 8
PAINLEVEL_OUTOF10: 5 - MODERATE PAIN
PAINLEVEL_OUTOF10: 4
PAINLEVEL_OUTOF10: 7
PAINLEVEL_OUTOF10: 5 - MODERATE PAIN
PAINLEVEL_OUTOF10: 4
PAINLEVEL_OUTOF10: 7
PAINLEVEL_OUTOF10: 4
PAINLEVEL_OUTOF10: 7
PAINLEVEL_OUTOF10: 4
PAINLEVEL_OUTOF10: 4
PAINLEVEL_OUTOF10: 7
PAINLEVEL_OUTOF10: 4
PAINLEVEL_OUTOF10: 4

## 2023-11-29 ASSESSMENT — PAIN DESCRIPTION - ORIENTATION
ORIENTATION: LEFT

## 2023-11-29 ASSESSMENT — COGNITIVE AND FUNCTIONAL STATUS - GENERAL
DAILY ACTIVITIY SCORE: 23
MOBILITY SCORE: 23
CLIMB 3 TO 5 STEPS WITH RAILING: A LITTLE
DRESSING REGULAR UPPER BODY CLOTHING: A LITTLE

## 2023-11-29 ASSESSMENT — PAIN DESCRIPTION - LOCATION
LOCATION: BREAST

## 2023-11-29 NOTE — PROGRESS NOTES
Sabrina Michadu is a 58 y.o. female on day 4 of admission presenting with Cellulitis.      Subjective   Patient seen and examined. Resting in bed. Pain with better control today. Has some nasal congestion this AM. Afebrile.        Objective     Last Recorded Vitals  /64 (BP Location: Right arm, Patient Position: Lying)   Pulse 72   Temp 36.6 °C (97.9 °F) (Tympanic)   Resp 16   Wt 85.3 kg (188 lb 0.8 oz)   SpO2 99%   Intake/Output last 3 Shifts:    Intake/Output Summary (Last 24 hours) at 11/29/2023 0928  Last data filed at 11/29/2023 0800  Gross per 24 hour   Intake 558 ml   Output 3025 ml   Net -2467 ml       Admission Weight  Weight: 85.3 kg (188 lb 0.8 oz) (11/25/23 1413)    Daily Weight  11/25/23 : 85.3 kg (188 lb 0.8 oz)    Image Results    No imaging to review over the past 24 hours.     Physical Exam    General: Alert and oriented x3, pleasant.   Cardiac: Regular rate and rhythm, S1/S2 , no murmur.   Pulmonary: Clear to auscultation on room air.   Abdomen: Soft, round, nontender. BS +x4.   Extremities: No edema.  Skin: No rashes or lesions. L breast wound vac in place, 2 JIL drains with serosang drainage present. No significant erythema seen.     Relevant Results    Scheduled medications  amLODIPine, 10 mg, oral, Daily  ascorbic acid, 250 mg, oral, BID  atorvastatin, 20 mg, oral, Nightly  cefepime, 2 g, intravenous, q8h  daptomycin, 6 mg/kg (Adjusted), intravenous, q24h JM  [Held by provider] enoxaparin, 40 mg, subcutaneous, q24h  ferrous gluconate, 38 mg of iron, oral, BID with meals  gabapentin, 400 mg, oral, TID  losartan, 50 mg, oral, Daily   And  hydroCHLOROthiazide, 12.5 mg, oral, Daily  ibuprofen, 600 mg, oral, 4x daily  insulin lispro, 0-10 Units, subcutaneous, TID with meals  metoprolol succinate XL, 50 mg, oral, Daily  sertraline, 100 mg, oral, BID  [Held by provider] sitaGLIPtin phos-metformin, 1 tablet, oral, BID with meals      Continuous medications     PRN medications  PRN  medications: benzocaine-menthol, dextromethorphan-guaifenesin, dextrose 10 % in water (D10W), dextrose, glucagon, HYDROmorphone, ondansetron, oxyCODONE-acetaminophen, oxyCODONE-acetaminophen, polyethylene glycol, traMADol     Results for orders placed or performed during the hospital encounter of 11/25/23 (from the past 24 hour(s))   POCT GLUCOSE   Result Value Ref Range    POCT Glucose 174 (H) 74 - 99 mg/dL   POCT GLUCOSE   Result Value Ref Range    POCT Glucose 131 (H) 74 - 99 mg/dL   POCT GLUCOSE   Result Value Ref Range    POCT Glucose 148 (H) 74 - 99 mg/dL   CBC   Result Value Ref Range    WBC 4.4 4.4 - 11.3 x10*3/uL    nRBC 0.0 0.0 - 0.0 /100 WBCs    RBC 3.45 (L) 4.00 - 5.20 x10*6/uL    Hemoglobin 10.8 (L) 12.0 - 16.0 g/dL    Hematocrit 34.2 (L) 36.0 - 46.0 %    MCV 99 80 - 100 fL    MCH 31.3 26.0 - 34.0 pg    MCHC 31.6 (L) 32.0 - 36.0 g/dL    RDW 13.8 11.5 - 14.5 %    Platelets 77 (L) 150 - 450 x10*3/uL   Basic Metabolic Panel   Result Value Ref Range    Glucose 141 (H) 65 - 99 mg/dL    Sodium 138 133 - 145 mmol/L    Potassium 3.6 3.4 - 5.1 mmol/L    Chloride 105 97 - 107 mmol/L    Bicarbonate 24 24 - 31 mmol/L    Urea Nitrogen 16 8 - 25 mg/dL    Creatinine 0.60 0.40 - 1.60 mg/dL    eGFR >90 >60 mL/min/1.73m*2    Calcium 9.2 8.5 - 10.4 mg/dL    Anion Gap 9 <=19 mmol/L   POCT GLUCOSE   Result Value Ref Range    POCT Glucose 127 (H) 74 - 99 mg/dL               Assessment/Plan      Cellulitis/infection left breast implant  -History of breast cancer s/p bilateral mastectomy with reconstruction   -Dr. Dorman follows.   -POD #2 implant removal   -Pain control  -Wound care per plastics, wound vac currently in place  -Continue IV Abx per ID- currently on daptomycin and cefepime, discussed with Dr. Harris this AM, he wants to trial a dose of vancomycin with pre-medication benadryl today  -Intra op cultures pending, so far without growth  -ID follows, will see if she will need IV or PO abx at DC          -CT shows 2.2  cm area of fluid retention left axilla, possibly small hematoma/seroma. No abscess identified                Benign essential hypertension  -Continue home medications   -BP stable, monitor     Diabetes mellitus   -Holding Janumet and Ozempic for now   -AC/HS glucose with SSI coverage  -Hypoglycemia protocol       -Monitor blood sugars, stable          Dyslipidemia  -Continue statin      DVT Risk  -Lovenox resumed, plts are low, but this is a chronic persistent issue for her, monitor closely.   -SCDs.      Plan  ID and Plastics on board.   Continue IV abx- currently on daptomycin and cefepime. Plan to trial vanco per Dr. ANDRADE today with pre medication.   POD #2 implant removal. Wound vac in place, plan to continue this at home.  Pain control, has been alternating percocet and tramadol, has not taken the IV med since yesterday, improving.  Has been on IV abx in the past for the same issue, will see what abx plan for DC is pending ID.   SS consult for DC planning assistance in case IV abx needed. Will also be going home with drains and wound vac.   Pending culture and ID input on abx to determine when she will be stable for DC home.        Ilana Ferrell, APRN-CNP

## 2023-11-29 NOTE — PROGRESS NOTES
Music Therapy Note    Sabrina Michaud was referred by nursing    Therapy Session  Referral Type: New referral this admission  Visit Type: New visit  Session Start Time: 1501  Intervention Delivery: In-person  Conflict of Service: Asleep               Treatment/Interventions                 Education Documentation  No documentation found.

## 2023-11-29 NOTE — PROGRESS NOTES
"Vancomycin Dosing by Pharmacy- INITIAL    Sabrina Michaud is a 58 y.o. year old female who Pharmacy has been consulted for vancomycin dosing for cellulitis, skin and soft tissue. Based on the patient's indication and renal status this patient will be dosed based on a goal AUC of 400-600.     Renal function is currently stable.    Visit Vitals  /64 (BP Location: Right arm, Patient Position: Lying)   Pulse 72   Temp 36.6 °C (97.9 °F) (Tympanic)   Resp 16        Lab Results   Component Value Date    CREATININE 0.60 11/29/2023    CREATININE 0.50 11/28/2023    CREATININE 0.50 11/27/2023    CREATININE 0.40 11/26/2023        Patient weight is No results found for: \"PTWEIGHT\"    No results found for: \"CULTURE\"     I/O last 3 completed shifts:  In: 658 (7.7 mL/kg) [P.O.:500; IV Piggyback:158]  Out: 3365 (39.4 mL/kg) [Urine:2700 (0.9 mL/kg/hr); Emesis/NG output:600; Drains:65]  Weight: 85.3 kg   [unfilled]    Lab Results   Component Value Date    PATIENTTEMP 37.0 08/17/2022          Assessment/Plan     Patient will not be given a loading dose.  Will initiate vancomycin maintenance,  1250 mg every 12 hours.    This dosing regimen is predicted by [employer] to result in the following pharmacokinetic parameters:  Loading dose: N/A  Regimen: 1250 mg IV every 12 hours.  Start time: 11:14 on 11/29/2023  Exposure target: AUC24 (range)400-600 mg/L.hr   AUC24,ss: 492 mg/L.hr  Probability of AUC24 > 400: 71 %  Ctrough,ss: 14.7 mg/L  Probability of Ctrough,ss > 20: 26 %  Probability of nephrotoxicity (Lodise LINDSEY 2009): 10 %    Follow-up level will be ordered on 11/30 with AM labs unless clinically indicated sooner.  Will continue to monitor renal function daily while on vancomycin and order serum creatinine at least every 48 hours if not already ordered.  Follow for continued vancomycin needs, clinical response, and signs/symptoms of toxicity.       Melanie Scruggs, PharmD       "

## 2023-11-29 NOTE — CARE PLAN
The patient's goals for the shift include  manage pain, IV antibx.     The clinical goals for the shift include manage pain, IV antibiotics, PICC placement, Vanco trial.     No barriers to meeting these goals.       Problem: Diabetes  Goal: Achieve decreasing blood glucose levels by end of shift  Outcome: Progressing  Goal: Increase stability of blood glucose readings by end of shift  Outcome: Progressing  Goal: Decrease in ketones present in urine by end of shift  Outcome: Progressing  Goal: Maintain electrolyte levels within acceptable range throughout shift  Outcome: Progressing  Goal: Maintain glucose levels >70mg/dl to <250mg/dl throughout shift  Outcome: Progressing  Goal: No changes in neurological exam by end of shift  Outcome: Progressing  Goal: Learn about and adhere to nutrition recommendations by end of shift  Outcome: Progressing  Goal: Vital signs within normal range for age by end of shift  Outcome: Progressing  Goal: Increase self care and/or family involovement by end of shift  Outcome: Progressing  Goal: Receive DSME education by end of shift  Outcome: Progressing     Problem: Skin  Goal: Decreased wound size/increased tissue granulation at next dressing change  Outcome: Progressing  Goal: Participates in plan/prevention/treatment measures  Outcome: Progressing  Goal: Prevent/manage excess moisture  Outcome: Progressing  Goal: Prevent/minimize sheer/friction injuries  Outcome: Progressing  Goal: Promote/optimize nutrition  Outcome: Progressing  Goal: Promote skin healing  Outcome: Progressing     Problem: Pain  Goal: My pain/discomfort is manageable  Outcome: Progressing     Problem: Safety  Goal: Patient will be injury free during hospitalization  Outcome: Progressing  Goal: I will remain free of falls  Outcome: Progressing     Problem: Daily Care  Goal: Daily care needs are met  Outcome: Progressing     Problem: Psychosocial Needs  Goal: Demonstrates ability to cope with  hospitalization/illness  Outcome: Progressing  Goal: Collaborate with me, my family, and caregiver to identify my specific goals  Outcome: Progressing     Problem: Discharge Barriers  Goal: My discharge needs are met  Outcome: Progressing     Problem: Pain  Goal: Takes deep breaths with improved pain control throughout the shift  Outcome: Progressing  Goal: Turns in bed with improved pain control throughout the shift  Outcome: Progressing  Goal: Walks with improved pain control throughout the shift  Outcome: Progressing  Goal: Performs ADL's with improved pain control throughout shift  Outcome: Progressing  Goal: Participates in PT with improved pain control throughout the shift  Outcome: Progressing  Goal: Free from opioid side effects throughout the shift  Outcome: Progressing  Goal: Free from acute confusion related to pain meds throughout the shift  Outcome: Progressing

## 2023-11-29 NOTE — PROGRESS NOTES
Sabrina Michaud is a 58 y.o. female on day 4 of admission presenting with Cellulitis.    Subjective   Interval History:       Afebrile, no chills  No pain at wound site  No CP/SOB   No N/V/D   Review of Systems   All other systems reviewed and are negative.      Objective   Range of Vitals (last 24 hours)  Heart Rate:  [75-87]   Temp:  [36.1 °C (97 °F)-36.6 °C (97.9 °F)]   Resp:  [15-16]   BP: (103-148)/(55-76)   SpO2:  [94 %-98 %]   Daily Weight  11/25/23 : 85.3 kg (188 lb 0.8 oz)    Body mass index is 33.32 kg/m².    Physical Exam  Constitutional:       Appearance: Normal appearance.   HENT:      Head: Normocephalic and atraumatic.      Nose: Nose normal.      Mouth/Throat:      Mouth: Mucous membranes are moist.      Pharynx: Oropharynx is clear.   Eyes:      Extraocular Movements: Extraocular movements intact.      Conjunctiva/sclera: Conjunctivae normal.   Cardiovascular:      Rate and Rhythm: Normal rate and regular rhythm.   Pulmonary:      Effort: Pulmonary effort is normal.      Breath sounds: Normal breath sounds.   Abdominal:      General: Bowel sounds are normal.      Palpations: Abdomen is soft.   Musculoskeletal:         General: Normal range of motion.      Cervical back: Normal range of motion and neck supple.   Skin:     Comments: Left chest NPWTD   Neurological:      General: No focal deficit present.      Mental Status: She is alert and oriented to person, place, and time. Mental status is at baseline.   Psychiatric:         Mood and Affect: Mood normal.         Behavior: Behavior normal.     Antibiotics  DAPTOmycin (Cubicin) 500 mg/50 mL  mg  cefepime (Maxipime) 2 g in dextrose 5 % 100 mL IV  amLODIPine (Norvasc) tablet 10 mg  ascorbic acid (Vitamin C) tablet 250 mg  atorvastatin (Lipitor) tablet 20 mg  ferrous gluconate (Fergon) 324 (38 Fe) mg tablet 38 mg of iron  gabapentin (Neurontin) capsule 400 mg  losartan 50 mg, hydroCHLOROthiazide 12.5 mg for Hyzaar 50/12.5  metoprolol succinate XL  (Toprol-XL) 24 hr tablet 50 mg  sertraline (Zoloft) tablet 100 mg  sitaGLIPtin phos-metformin (Janumet)  mg per tablet 1 tablet  enoxaparin (Lovenox) syringe 40 mg  acetaminophen (Tylenol) tablet 650 mg  acetaminophen (Tylenol) tablet 650 mg  polyethylene glycol (Glycolax, Miralax) packet 17 g  benzocaine-menthol (Cepastat Sore Throat) 15-3.6 mg lozenge 1 lozenge  dextromethorphan-guaifenesin (Robitussin DM)  mg/5 mL oral liquid 5 mL  dextrose 50 % injection 25 g  glucagon (Glucagen) injection 1 mg  dextrose 10 % in water (D10W) infusion  insulin lispro (HumaLOG) injection 0-5 Units  cefepime (Maxipime) 2 g in dextrose 5 % 100 mL IV  DAPTOmycin (Cubicin) 500 mg/50 mL  mg  iohexol (OMNIPaque) 350 mg iodine/mL solution 75 mL  enoxaparin (Lovenox) syringe 40 mg  losartan (Cozaar) tablet 50 mg  hydroCHLOROthiazide (Microzide) capsule 12.5 mg  dextrose 5 % and lactated Ringer's infusion  celecoxib (CeleBREX) capsule 400 mg  acetaminophen (Tylenol) tablet 650 mg  ondansetron ODT (Zofran-ODT) disintegrating tablet 4 mg  chlorhexidine (Hibiclens) 4 % liquid 1 Application  DAPTOmycin (Cubicin) 400 mg in sodium chloride 0.9% 50 mL IV  lidocaine-epinephrine (Xylocaine W/EPI) injection  - Omnicell Override Pull  bupivacaine PF (Marcaine) injection  - Omnicell Override Pull  oxygen (O2) therapy  lactated Ringer's infusion  acetaminophen (Tylenol) tablet 650 mg  HYDROmorphone PF (Dilaudid) injection 0.2 mg  oxyCODONE (Roxicodone) immediate release tablet 5 mg  HYDROmorphone (Dilaudid) injection 0.4 mg  oxyCODONE (Roxicodone) immediate release tablet 10 mg  ondansetron (Zofran) injection 4 mg  promethazine (Phenergan) 6.25 mg in sodium chloride 0.9% 50 mL IV  albuterol 2.5 mg /3 mL (0.083 %) nebulizer solution 2.5 mg  BUPivacaine-EPINEPHrine (Marcaine w/EPI) 0.25 %-1:200,000 injection  lidocaine-epinephrine (Xylocaine W/EPI) 1 %-1:100,000 injection  bupivacaine PF (Marcaine) 0.5 % (5 mg/mL) injection  tranexamic  acid (Cyklokapron) injection  tranexamic acid (Cyklokapron) injection  - Omnicell Override Pull  HYDROmorphone (Dilaudid) injection  - Omnicell Override Pull  HYDROmorphone (Dilaudid) injection 0.4 mg  enoxaparin (Lovenox) syringe 40 mg  HYDROmorphone (Dilaudid) injection 0.2 mg  oxyCODONE-acetaminophen (Percocet) 5-325 mg per tablet 2 tablet  ibuprofen tablet 600 mg  traMADol (Ultram) tablet 50 mg  sitaGLIPtin phos-metformin (Janumet)  mg per tablet 1 tablet  oxyCODONE-acetaminophen (Percocet) 5-325 mg per tablet 1 tablet  enoxaparin (Lovenox) injection 40 mg  oxyCODONE-acetaminophen (Percocet) tablet 5-325 mg  ondansetron (Zofran) tablet  insulin lispro (HumaLOG) injection 0-10 Units  ondansetron (Zofran) injection 4 mg      Relevant Results  Labs  Results from last 72 hours   Lab Units 11/29/23 0439 11/28/23 0430 11/27/23 0431   WBC AUTO x10*3/uL 4.4 5.7 5.4   HEMOGLOBIN g/dL 10.8* 10.9* 11.6*   HEMATOCRIT % 34.2* 34.9* 36.3   PLATELETS AUTO x10*3/uL 77* 92* 103*     Results from last 72 hours   Lab Units 11/29/23 0439 11/28/23 0430 11/27/23 0431   SODIUM mmol/L 138 138 139   POTASSIUM mmol/L 3.6 3.9 3.5   CHLORIDE mmol/L 105 107 105   CO2 mmol/L 24 22* 23*   BUN mg/dL 16 14 15   CREATININE mg/dL 0.60 0.50 0.50   GLUCOSE mg/dL 141* 117* 120*   CALCIUM mg/dL 9.2 8.9 9.5   ANION GAP mmol/L 9 9 11   EGFR mL/min/1.73m*2 >90 >90 >90         Estimated Creatinine Clearance: 105.8 mL/min (by C-G formula based on SCr of 0.6 mg/dL).  CRP   Date Value Ref Range Status   06/01/2022 1.02 (A) mg/dL Final     Comment:     REF VALUE  < 1.00     05/31/2022 0.77 mg/dL Final     Comment:     REF VALUE  < 1.00     05/20/2021 1.21 (A) mg/dL Final     Comment:     REF VALUE  < 1.00       Microbiology  Reviewed-pending   Imaging  CT chest w IV contrast    Result Date: 11/25/2023  Interpreted By:  Walker Figueroa, STUDY: CT CHEST W IV CONTRAST; 11/25/2023 7:25 pm   INDICATION: Signs/Symptoms:abscess. Redness and warmth left  breast. Patient has had 2 prior surgeries for infection previous mastectomy, reconstruction site.   COMPARISON: 08/17/2022   ACCESSION NUMBER(S): [id number]   ORDERING CLINICIAN: YANNA CHAMORRO   TECHNIQUE: The examination was performed with the intravenous injection of 75 ml of non-ionic iodinated contrast was injected intravenously.   A helical acquisition data was obtained with sagittal coronal reconstructions performed.   One or more of the following dose reduction techniques were used: Automated exposure control Adjustment of the mA and/or kV according to patient size, and/or use of iterative reconstruction technique.   FINDINGS: There is no hilar or mediastinal lymphadenopathy present.  No infiltrates or effusions are identified.   Chest Wall: There are bilateral breast implants present. There is no evidence for a chest wall abscess. There is a hazy appearance of the fat surrounding the implants bilaterally, nonspecific in appearance. It is unclear whether these findings are postsurgical in nature or whether the air indicative of underlying infection. Cortney-implant haziness is slightly more pronounced on the left compared to the right, particularly inferiorly and medially.   There is a 2.2 cm rounded lesion with fluid attenuation within the left axilla possibly representing a small residual postoperative seroma/hematoma from axillary lymph node resection. Abscess is not entirely excluded although there is no surrounding haziness of the adjacent fat making this somewhat less likely.   Upper Abdominal Findings: There is a nodular appearance of the hepatic contour consistent with cirrhosis. There is recanalization of the umbilical vein consistent with portal venous hypertension. Splenomegaly is also partially included within the field of view, consistent with portal venous hypertension. There are few too small to characterize hypodense foci within the right and left lobes of the liver most likely representing  cysts with the largest measuring 1.4 cm within the left lobe. These are unchanged compared to the prior study from 08/17/2022.   Skeletal System: No fractures or destructive lesions are identified.       1. There is a small, 2.2 cm rounded area of fluid attenuation within the left axilla possibly representing a small hematoma/seroma from axillary lymph node resection. 2. Bilateral breast implants are present with haziness of the surrounding fat with the haziness slightly more pronounced surrounding the left breast implant. A discrete abscess is not identified adjacent to either implant. 3. Cirrhosis with portal venous hypertension and splenomegaly as noted above.   MACRO: none   Signed by: Walker Figueroa 11/25/2023 8:04 PM Dictation workstation:   [id number]    ECG 12 lead    Result Date: 10/31/2023  Normal sinus rhythm Normal ECG When compared with ECG of 17-AUG-2022 08:45, Previous ECG has undetermined rhythm, needs review QT has shortened Confirmed by Herson Quiroga ([id number]) on 10/31/2023 4:04:56 PM    BI US breast limited left    Result Date: 10/31/2023  Interpreted By:  Lucien Martínez, STUDY: BI US BREAST LIMITED LEFT; 10/31/2023 1:58 pm   INDICATION: Signs/Symptoms:possible infection. Rule out abscess.   COMPARISON: Mammograms from May and June 2023   ACCESSION NUMBER(S): [id number]   ORDERING CLINICIAN: NEDRA DING   TECHNIQUE: Limited real-time grayscale and color Doppler imaging of the  breast was performed.   FINDINGS: Patient has previous mastectomy and reconstruction surgery. Elongated fluid collection is seen anteriorly to the left breast implant, measuring 6.4 by 0.5 by 3.7 cm. On the Doppler images there is no associated surrounding hyperemia.       Elongated fluid collection adjacent to the left breast implant, without associated surrounding hyperemia, most likely postsurgical seroma. No definite drainable abscess visualized.   ACR BI-RADS 2:  Benign finding.   Correlate clinically and  follow-up as needed.   MACRO: None.   Signed by: Lucien Martínez 10/31/2023 2:16 PM Dictation workstation:   [id number]     Assessment/Plan   Left breast cellulitis  Left breast prosthetic implant- infected  Diabetes Mellitus  Previous culture with pseudomonas, Corynebacterium-possible contaminant  Allergy listed to vancomycin, zosyn     IV Cefepime  IV Vancomycin-slow infusion, pre-treat with Benadryl   discontinue IV daptomycin  Follow up culture  Follow up intraoperative findings  PICC line placement   Monitor temp and WBC  Local care      Sundeep Harris MD

## 2023-11-29 NOTE — NURSING NOTE
Patient complaining of persistent itchiness with no visible rash. Hospitalist MERA Ferrell and Dr. Harris notified. 1x dose of benadryl ordered and given at this time. Monitoring symptoms closely.

## 2023-11-29 NOTE — CARE PLAN
Problem: Diabetes  Goal: Achieve decreasing blood glucose levels by end of shift  Outcome: Progressing  Goal: Increase stability of blood glucose readings by end of shift  Outcome: Progressing  Goal: Decrease in ketones present in urine by end of shift  Outcome: Progressing  Goal: Maintain electrolyte levels within acceptable range throughout shift  Outcome: Progressing  Goal: Maintain glucose levels >70mg/dl to <250mg/dl throughout shift  Outcome: Progressing  Goal: No changes in neurological exam by end of shift  Outcome: Progressing  Goal: Learn about and adhere to nutrition recommendations by end of shift  Outcome: Progressing  Goal: Vital signs within normal range for age by end of shift  Outcome: Progressing  Goal: Increase self care and/or family involovement by end of shift  Outcome: Progressing  Goal: Receive DSME education by end of shift  Outcome: Progressing     Problem: Skin  Goal: Decreased wound size/increased tissue granulation at next dressing change  Outcome: Progressing  Goal: Participates in plan/prevention/treatment measures  Outcome: Progressing  Goal: Prevent/manage excess moisture  Outcome: Progressing  Goal: Prevent/minimize sheer/friction injuries  Outcome: Progressing  Goal: Promote/optimize nutrition  Outcome: Progressing  Goal: Promote skin healing  Outcome: Progressing     Problem: Pain  Goal: My pain/discomfort is manageable  Outcome: Progressing     Problem: Safety  Goal: Patient will be injury free during hospitalization  Outcome: Progressing  Goal: I will remain free of falls  Outcome: Progressing     Problem: Daily Care  Goal: Daily care needs are met  Outcome: Progressing     Problem: Psychosocial Needs  Goal: Demonstrates ability to cope with hospitalization/illness  Outcome: Progressing  Goal: Collaborate with me, my family, and caregiver to identify my specific goals  Outcome: Progressing     Problem: Discharge Barriers  Goal: My discharge needs are met  Outcome:  Progressing     Problem: Pain  Goal: Takes deep breaths with improved pain control throughout the shift  Outcome: Progressing  Goal: Turns in bed with improved pain control throughout the shift  Outcome: Progressing  Goal: Walks with improved pain control throughout the shift  Outcome: Progressing  Goal: Performs ADL's with improved pain control throughout shift  Outcome: Progressing  Goal: Participates in PT with improved pain control throughout the shift  Outcome: Progressing  Goal: Free from opioid side effects throughout the shift  Outcome: Progressing  Goal: Free from acute confusion related to pain meds throughout the shift  Outcome: Progressing   The patient's goals for the shift include      The clinical goals for the shift include pain management    Over the shift, the patient did not make progress toward the following goals. Barriers to progression include . Recommendations to address these barriers include .

## 2023-11-29 NOTE — NURSING NOTE
Patient complains of slight itchiness following vanco dose. No redness noted and patient does not look like she is in distress. Vitals stable other than minimally elevated temp. Dr. Harris notified via secure chat. No new orders at this time, only to continue monitoring.

## 2023-11-29 NOTE — CONSULTS
Vascular Access Team  Consult     Visit Date: 11/29/2023      Patient Name: Sabrina Michaud         MRN: [id number]                Reason for Consult: Picc line placement             Assessment: Reviewed chart for any contraindications, pt had lymph node removal on L side, no other contraindications noted, will place picc line on R side.            Plan: Plan for picc placement today        Tori Rowe RN  11/29/2023  12:27 PM

## 2023-11-29 NOTE — CONSULTS
Vancomycin Dosing by Pharmacy- Cessation of Therapy    Consult to pharmacy for vancomycin dosing has been discontinued by the prescriber, pharmacy will sign off at this time.    Please call pharmacy if there are further questions or re-enter a consult if vancomycin is resumed.     KERLINE DHALIWAL, PharmD

## 2023-11-29 NOTE — PROGRESS NOTES
"Sabrina Michaud is a 58 y.o. female on day 4 of admission presenting with Cellulitis.    Subjective   My pain management is better.  My cultures are not back yet.  I have no response to vancomycin       Objective   No rash with vancomycin administration thus far.  Physical Exam Supple.  Abdomen benign.  PICC line exit site nontender no erythema.  Prevena VAC device in place.  Zone of erythema of medial breast is now improved in color.  No wound drainage.  Serosanguineous JIL effluent.  JIL exit site satisfactory with bio discs in place.  Patient is ambulatory and tolerating p.o.    Last Recorded Vitals  Blood pressure 145/64, pulse 72, temperature 36.6 °C (97.9 °F), temperature source Tympanic, resp. rate 16, height 1.6 m (5' 2.99\"), weight 85.3 kg (188 lb 0.8 oz), SpO2 99 %.  Intake/Output last 3 Shifts:  I/O last 3 completed shifts:  In: 658 (7.7 mL/kg) [P.O.:500; IV Piggyback:158]  Out: 3365 (39.4 mL/kg) [Urine:2700 (0.9 mL/kg/hr); Emesis/NG output:600; Drains:65]  Weight: 85.3 kg     Relevant Results              Results for orders placed or performed during the hospital encounter of 11/25/23 (from the past 24 hour(s))   POCT GLUCOSE   Result Value Ref Range    POCT Glucose 131 (H) 74 - 99 mg/dL   POCT GLUCOSE   Result Value Ref Range    POCT Glucose 148 (H) 74 - 99 mg/dL   CBC   Result Value Ref Range    WBC 4.4 4.4 - 11.3 x10*3/uL    nRBC 0.0 0.0 - 0.0 /100 WBCs    RBC 3.45 (L) 4.00 - 5.20 x10*6/uL    Hemoglobin 10.8 (L) 12.0 - 16.0 g/dL    Hematocrit 34.2 (L) 36.0 - 46.0 %    MCV 99 80 - 100 fL    MCH 31.3 26.0 - 34.0 pg    MCHC 31.6 (L) 32.0 - 36.0 g/dL    RDW 13.8 11.5 - 14.5 %    Platelets 77 (L) 150 - 450 x10*3/uL   Basic Metabolic Panel   Result Value Ref Range    Glucose 141 (H) 65 - 99 mg/dL    Sodium 138 133 - 145 mmol/L    Potassium 3.6 3.4 - 5.1 mmol/L    Chloride 105 97 - 107 mmol/L    Bicarbonate 24 24 - 31 mmol/L    Urea Nitrogen 16 8 - 25 mg/dL    Creatinine 0.60 0.40 - 1.60 mg/dL    eGFR >90 >60 " mL/min/1.73m*2    Calcium 9.2 8.5 - 10.4 mg/dL    Anion Gap 9 <=19 mmol/L   POCT GLUCOSE   Result Value Ref Range    POCT Glucose 127 (H) 74 - 99 mg/dL   POCT GLUCOSE   Result Value Ref Range    POCT Glucose 209 (H) 74 - 99 mg/dL      This patient has a central line   Reason for the central line remaining today? Parenteral medication this is a PICC line and will remain in until IV antibiotic duration requirement has been completed.    Scheduled medications  amLODIPine, 10 mg, oral, Daily  ascorbic acid, 250 mg, oral, BID  atorvastatin, 20 mg, oral, Nightly  cefepime, 2 g, intravenous, q8h  diphenhydrAMINE, 25 mg, oral, q12h  enoxaparin, 40 mg, subcutaneous, Daily  ferrous gluconate, 38 mg of iron, oral, BID with meals  fluticasone, 2 spray, Each Nostril, Daily  gabapentin, 400 mg, oral, TID  losartan, 50 mg, oral, Daily   And  hydroCHLOROthiazide, 12.5 mg, oral, Daily  ibuprofen, 600 mg, oral, 4x daily  insulin lispro, 0-10 Units, subcutaneous, TID with meals  lidocaine, 5 mL, infiltration, Once  loratadine, 10 mg, oral, Daily  metoprolol succinate XL, 50 mg, oral, Daily  sertraline, 100 mg, oral, BID  [Held by provider] sitaGLIPtin phos-metformin, 1 tablet, oral, BID with meals  vancomycin, 1,250 mg, intravenous, q12h      Continuous medications     PRN medications  PRN medications: alteplase, benzocaine-menthol, dextromethorphan-guaifenesin, dextrose 10 % in water (D10W), dextrose, glucagon, HYDROmorphone, ondansetron, oxyCODONE-acetaminophen, oxyCODONE-acetaminophen, polyethylene glycol, traMADol               Assessment/Plan   Principal Problem:    Cellulitis  Active Problems:    Benign essential hypertension    Diabetes mellitus type 2 in obese (CMS/HCC)    Dyslipidemia    Cellulitis of left breast    Seroma of breast    History of breast cancer    Wound cultures pending from periprosthetic fluid.  Pending IV antibiotic selection patient may be discharged once fluid cultures are returned.  Her pain management  is improved satisfactorily.  Anticipate discharge tomorrow per medicine and infectious disease decision makers.       I spent 22 minutes in the professional and overall care of this patient.      Tim Dorman MD

## 2023-11-30 ENCOUNTER — HOME HEALTH ADMISSION (OUTPATIENT)
Dept: HOME HEALTH SERVICES | Facility: HOME HEALTH | Age: 58
End: 2023-11-30
Payer: COMMERCIAL

## 2023-11-30 ENCOUNTER — APPOINTMENT (OUTPATIENT)
Dept: WOUND CARE | Facility: HOSPITAL | Age: 58
End: 2023-11-30
Payer: COMMERCIAL

## 2023-11-30 LAB
ANION GAP SERPL CALC-SCNC: 10 MMOL/L
BACTERIA BLD CULT: NORMAL
BACTERIA BLD CULT: NORMAL
BACTERIA SPEC CULT: NORMAL
BUN SERPL-MCNC: 19 MG/DL (ref 8–25)
CALCIUM SERPL-MCNC: 9.1 MG/DL (ref 8.5–10.4)
CHLORIDE SERPL-SCNC: 102 MMOL/L (ref 97–107)
CO2 SERPL-SCNC: 23 MMOL/L (ref 24–31)
CREAT SERPL-MCNC: 0.7 MG/DL (ref 0.4–1.6)
ERYTHROCYTE [DISTWIDTH] IN BLOOD BY AUTOMATED COUNT: 14.2 % (ref 11.5–14.5)
GFR SERPL CREATININE-BSD FRML MDRD: >90 ML/MIN/1.73M*2
GLUCOSE BLD MANUAL STRIP-MCNC: 100 MG/DL (ref 74–99)
GLUCOSE BLD MANUAL STRIP-MCNC: 109 MG/DL (ref 74–99)
GLUCOSE BLD MANUAL STRIP-MCNC: 144 MG/DL (ref 74–99)
GLUCOSE BLD MANUAL STRIP-MCNC: 144 MG/DL (ref 74–99)
GLUCOSE BLD MANUAL STRIP-MCNC: 179 MG/DL (ref 74–99)
GLUCOSE SERPL-MCNC: 126 MG/DL (ref 65–99)
GRAM STN SPEC: NORMAL
GRAM STN SPEC: NORMAL
HCT VFR BLD AUTO: 31.6 % (ref 36–46)
HGB BLD-MCNC: 10.3 G/DL (ref 12–16)
MCH RBC QN AUTO: 31.9 PG (ref 26–34)
MCHC RBC AUTO-ENTMCNC: 32.6 G/DL (ref 32–36)
MCV RBC AUTO: 98 FL (ref 80–100)
NRBC BLD-RTO: 0 /100 WBCS (ref 0–0)
PLATELET # BLD AUTO: 85 X10*3/UL (ref 150–450)
POTASSIUM SERPL-SCNC: 3.8 MMOL/L (ref 3.4–5.1)
RBC # BLD AUTO: 3.23 X10*6/UL (ref 4–5.2)
SODIUM SERPL-SCNC: 135 MMOL/L (ref 133–145)
WBC # BLD AUTO: 5.9 X10*3/UL (ref 4.4–11.3)

## 2023-11-30 PROCEDURE — 82947 ASSAY GLUCOSE BLOOD QUANT: CPT

## 2023-11-30 PROCEDURE — 2500000004 HC RX 250 GENERAL PHARMACY W/ HCPCS (ALT 636 FOR OP/ED): Performed by: NURSE PRACTITIONER

## 2023-11-30 PROCEDURE — 2500000001 HC RX 250 WO HCPCS SELF ADMINISTERED DRUGS (ALT 637 FOR MEDICARE OP): Performed by: SPECIALIST

## 2023-11-30 PROCEDURE — 1100000001 HC PRIVATE ROOM DAILY

## 2023-11-30 PROCEDURE — 2500000001 HC RX 250 WO HCPCS SELF ADMINISTERED DRUGS (ALT 637 FOR MEDICARE OP)

## 2023-11-30 PROCEDURE — 96372 THER/PROPH/DIAG INJ SC/IM: CPT | Performed by: NURSE PRACTITIONER

## 2023-11-30 PROCEDURE — 2500000001 HC RX 250 WO HCPCS SELF ADMINISTERED DRUGS (ALT 637 FOR MEDICARE OP): Performed by: NURSE PRACTITIONER

## 2023-11-30 PROCEDURE — 85027 COMPLETE CBC AUTOMATED: CPT | Performed by: NURSE PRACTITIONER

## 2023-11-30 PROCEDURE — 82374 ASSAY BLOOD CARBON DIOXIDE: CPT | Performed by: NURSE PRACTITIONER

## 2023-11-30 PROCEDURE — 2500000001 HC RX 250 WO HCPCS SELF ADMINISTERED DRUGS (ALT 637 FOR MEDICARE OP): Performed by: INTERNAL MEDICINE

## 2023-11-30 PROCEDURE — 2500000004 HC RX 250 GENERAL PHARMACY W/ HCPCS (ALT 636 FOR OP/ED): Performed by: INTERNAL MEDICINE

## 2023-11-30 RX ORDER — DAPTOMYCIN 50 MG/ML
250 INJECTION, POWDER, LYOPHILIZED, FOR SOLUTION INTRAVENOUS DAILY
Qty: 2750 MG | Refills: 0 | Status: SHIPPED
Start: 2023-11-30 | End: 2023-12-11

## 2023-11-30 RX ORDER — DIPHENHYDRAMINE HCL 25 MG
25 TABLET ORAL 2 TIMES DAILY PRN
Status: DISCONTINUED | OUTPATIENT
Start: 2023-11-30 | End: 2023-12-01 | Stop reason: HOSPADM

## 2023-11-30 RX ORDER — CEFEPIME 1 G/50ML
2 INJECTION, SOLUTION INTRAVENOUS EVERY 12 HOURS
Qty: 2200 ML | Refills: 0 | Status: SHIPPED
Start: 2023-11-30 | End: 2023-12-11

## 2023-11-30 RX ADMIN — AMLODIPINE BESYLATE 10 MG: 10 TABLET ORAL at 08:02

## 2023-11-30 RX ADMIN — IBUPROFEN 600 MG: 600 TABLET ORAL at 20:54

## 2023-11-30 RX ADMIN — FLUTICASONE PROPIONATE 2 SPRAY: 50 SPRAY, METERED NASAL at 08:06

## 2023-11-30 RX ADMIN — GABAPENTIN 400 MG: 400 CAPSULE ORAL at 20:54

## 2023-11-30 RX ADMIN — OXYCODONE AND ACETAMINOPHEN 2 TABLET: 5; 325 TABLET ORAL at 20:54

## 2023-11-30 RX ADMIN — TRAMADOL HYDROCHLORIDE 50 MG: 50 TABLET ORAL at 08:02

## 2023-11-30 RX ADMIN — ATORVASTATIN CALCIUM 20 MG: 20 TABLET, FILM COATED ORAL at 20:54

## 2023-11-30 RX ADMIN — DIPHENHYDRAMINE HYDROCHLORIDE 25 MG: 25 TABLET ORAL at 11:01

## 2023-11-30 RX ADMIN — OXYCODONE HYDROCHLORIDE AND ACETAMINOPHEN 250 MG: 500 TABLET ORAL at 20:54

## 2023-11-30 RX ADMIN — CEFEPIME 2 G: 2 INJECTION, POWDER, FOR SOLUTION INTRAVENOUS at 16:19

## 2023-11-30 RX ADMIN — Medication 38 MG OF IRON: at 16:19

## 2023-11-30 RX ADMIN — LORATADINE 10 MG: 10 TABLET ORAL at 08:02

## 2023-11-30 RX ADMIN — DAPTOMYCIN 250 MG: 350 INJECTION, POWDER, LYOPHILIZED, FOR SOLUTION INTRAVENOUS at 10:57

## 2023-11-30 RX ADMIN — ENOXAPARIN SODIUM 40 MG: 40 INJECTION SUBCUTANEOUS at 08:02

## 2023-11-30 RX ADMIN — SERTRALINE 100 MG: 100 TABLET, FILM COATED ORAL at 08:02

## 2023-11-30 RX ADMIN — HYDROCHLOROTHIAZIDE 12.5 MG: 12.5 CAPSULE ORAL at 08:02

## 2023-11-30 RX ADMIN — OXYCODONE AND ACETAMINOPHEN 2 TABLET: 5; 325 TABLET ORAL at 16:19

## 2023-11-30 RX ADMIN — OXYCODONE AND ACETAMINOPHEN 2 TABLET: 5; 325 TABLET ORAL at 01:08

## 2023-11-30 RX ADMIN — OXYCODONE AND ACETAMINOPHEN 2 TABLET: 5; 325 TABLET ORAL at 05:00

## 2023-11-30 RX ADMIN — GABAPENTIN 400 MG: 400 CAPSULE ORAL at 15:00

## 2023-11-30 RX ADMIN — OXYCODONE AND ACETAMINOPHEN 2 TABLET: 5; 325 TABLET ORAL at 12:50

## 2023-11-30 RX ADMIN — IBUPROFEN 600 MG: 600 TABLET ORAL at 12:50

## 2023-11-30 RX ADMIN — IBUPROFEN 600 MG: 600 TABLET ORAL at 07:07

## 2023-11-30 RX ADMIN — METOPROLOL SUCCINATE 50 MG: 50 TABLET, EXTENDED RELEASE ORAL at 08:02

## 2023-11-30 RX ADMIN — CEFEPIME 2 G: 2 INJECTION, POWDER, FOR SOLUTION INTRAVENOUS at 08:02

## 2023-11-30 RX ADMIN — LOSARTAN POTASSIUM 50 MG: 50 TABLET, FILM COATED ORAL at 08:02

## 2023-11-30 RX ADMIN — OXYCODONE HYDROCHLORIDE AND ACETAMINOPHEN 250 MG: 500 TABLET ORAL at 08:02

## 2023-11-30 RX ADMIN — SERTRALINE 100 MG: 100 TABLET, FILM COATED ORAL at 20:54

## 2023-11-30 RX ADMIN — GABAPENTIN 400 MG: 400 CAPSULE ORAL at 08:02

## 2023-11-30 RX ADMIN — Medication 38 MG OF IRON: at 08:02

## 2023-11-30 RX ADMIN — IBUPROFEN 600 MG: 600 TABLET ORAL at 16:19

## 2023-11-30 ASSESSMENT — PAIN - FUNCTIONAL ASSESSMENT
PAIN_FUNCTIONAL_ASSESSMENT: 0-10
PAIN_FUNCTIONAL_ASSESSMENT: FLACC (FACE, LEGS, ACTIVITY, CRY, CONSOLABILITY)
PAIN_FUNCTIONAL_ASSESSMENT: FLACC (FACE, LEGS, ACTIVITY, CRY, CONSOLABILITY)
PAIN_FUNCTIONAL_ASSESSMENT: 0-10

## 2023-11-30 ASSESSMENT — COGNITIVE AND FUNCTIONAL STATUS - GENERAL: MOBILITY SCORE: 24

## 2023-11-30 ASSESSMENT — PAIN SCALES - GENERAL
PAINLEVEL_OUTOF10: 0 - NO PAIN
PAINLEVEL_OUTOF10: 0 - NO PAIN
PAINLEVEL_OUTOF10: 2
PAINLEVEL_OUTOF10: 7
PAINLEVEL_OUTOF10: 8
PAINLEVEL_OUTOF10: 7
PAINLEVEL_OUTOF10: 4
PAINLEVEL_OUTOF10: 7
PAINLEVEL_OUTOF10: 3

## 2023-11-30 ASSESSMENT — PAIN DESCRIPTION - ORIENTATION
ORIENTATION: LEFT

## 2023-11-30 ASSESSMENT — PAIN DESCRIPTION - LOCATION
LOCATION: BREAST

## 2023-11-30 NOTE — NURSING NOTE
ON rounding, R single lumen  PICC line dressing is dry and intact. Line flushes easily with brisk blood return. New curos cap applied.

## 2023-11-30 NOTE — PROGRESS NOTES
PCN informed per ROLF Barrios at [facility] that patient will need home antibiotics again at discharge as patient recently finished home infusion with [facility] team. PCN checked and Dr. Harris placed orders for Daptomycin and Cefepime in [facility] and sent to  infusion pharmacy. PCN sent message to  infusion intake Lakshmi Briceno asking to make sure the infusion pharmacy is running cost out for the medications. PCN sent message to SCOTTIE Preciado asking to have the orders placed for [facility] team for infusion. Awaiting responses.       Sebastian Foote

## 2023-11-30 NOTE — PROGRESS NOTES
Sabrina Michaud is a 58 y.o. female on day 5 of admission presenting with Cellulitis.      Subjective   Patient seen and examined. Resting in bed. States pain is still moderate, she feels the pain meds are helping. Denies any fevers or chills. Trialed vancomycin yesterday, had itching and some mild skin redness. Improved with benadryl.        Objective     Last Recorded Vitals  /71 (BP Location: Right leg, Patient Position: Lying)   Pulse 68   Temp 36.3 °C (97.3 °F) (Temporal)   Resp 16   Wt 85.3 kg (188 lb 0.8 oz)   SpO2 97%   Intake/Output last 3 Shifts:    Intake/Output Summary (Last 24 hours) at 11/30/2023 1120  Last data filed at 11/30/2023 1100  Gross per 24 hour   Intake 1650 ml   Output 1078 ml   Net 572 ml       Admission Weight  Weight: 85.3 kg (188 lb 0.8 oz) (11/25/23 1413)    Daily Weight  11/25/23 : 85.3 kg (188 lb 0.8 oz)    Image Results    No imaging to review over the past 24 hours.     Physical Exam    General: Alert and oriented x3, pleasant.   Cardiac: Regular rate and rhythm, S1/S2 , no murmur.   Pulmonary: Clear to auscultation on room air.   Abdomen: Soft, round, nontender. BS +x4.   Extremities: No edema.  Skin: No rashes or lesions.  Wound vac and JIL drain x2 to the L breast, no significant erythema seen.    Relevant Results    Scheduled medications  amLODIPine, 10 mg, oral, Daily  ascorbic acid, 250 mg, oral, BID  atorvastatin, 20 mg, oral, Nightly  cefepime, 2 g, intravenous, q8h  daptomycin, 4 mg/kg (Adjusted), intravenous, q24h JM  diphenhydrAMINE, 25 mg, oral, q12h  enoxaparin, 40 mg, subcutaneous, Daily  ferrous gluconate, 38 mg of iron, oral, BID with meals  fluticasone, 2 spray, Each Nostril, Daily  gabapentin, 400 mg, oral, TID  losartan, 50 mg, oral, Daily   And  hydroCHLOROthiazide, 12.5 mg, oral, Daily  ibuprofen, 600 mg, oral, 4x daily  insulin lispro, 0-10 Units, subcutaneous, TID with meals  lidocaine, 5 mL, infiltration, Once  loratadine, 10 mg, oral,  Daily  metoprolol succinate XL, 50 mg, oral, Daily  sertraline, 100 mg, oral, BID  [Held by provider] sitaGLIPtin phos-metformin, 1 tablet, oral, BID with meals      Continuous medications     PRN medications  PRN medications: alteplase, benzocaine-menthol, dextromethorphan-guaifenesin, dextrose 10 % in water (D10W), dextrose, glucagon, HYDROmorphone, ondansetron, oxyCODONE-acetaminophen, oxyCODONE-acetaminophen, polyethylene glycol, traMADol     Results for orders placed or performed during the hospital encounter of 11/25/23 (from the past 24 hour(s))   POCT GLUCOSE   Result Value Ref Range    POCT Glucose 102 (H) 74 - 99 mg/dL   POCT GLUCOSE   Result Value Ref Range    POCT Glucose 124 (H) 74 - 99 mg/dL   CBC   Result Value Ref Range    WBC 5.9 4.4 - 11.3 x10*3/uL    nRBC 0.0 0.0 - 0.0 /100 WBCs    RBC 3.23 (L) 4.00 - 5.20 x10*6/uL    Hemoglobin 10.3 (L) 12.0 - 16.0 g/dL    Hematocrit 31.6 (L) 36.0 - 46.0 %    MCV 98 80 - 100 fL    MCH 31.9 26.0 - 34.0 pg    MCHC 32.6 32.0 - 36.0 g/dL    RDW 14.2 11.5 - 14.5 %    Platelets 85 (L) 150 - 450 x10*3/uL   Basic Metabolic Panel   Result Value Ref Range    Glucose 126 (H) 65 - 99 mg/dL    Sodium 135 133 - 145 mmol/L    Potassium 3.8 3.4 - 5.1 mmol/L    Chloride 102 97 - 107 mmol/L    Bicarbonate 23 (L) 24 - 31 mmol/L    Urea Nitrogen 19 8 - 25 mg/dL    Creatinine 0.70 0.40 - 1.60 mg/dL    eGFR >90 >60 mL/min/1.73m*2    Calcium 9.1 8.5 - 10.4 mg/dL    Anion Gap 10 <=19 mmol/L   POCT GLUCOSE   Result Value Ref Range    POCT Glucose 109 (H) 74 - 99 mg/dL             Assessment/Plan      Cellulitis/infection left breast implant  -History of breast cancer s/p bilateral mastectomy with reconstruction   -Dr. Dandy follows.   -POD #3 implant removal   -Pain control  -Wound care per plastics, wound vac currently in place, plan to dc with this  -Continue IV Abx per ID- currently on daptomycin and cefepime, did not do well with trial dose of vanco yesterday  -Intra op cultures  pending, so far without growth  -ID follows, PICC placed and plan for IV dapto and cefepime at dc            Benign essential hypertension  -Continue home medications   -BP stable, monitor     Diabetes mellitus   -Holding Janumet and Ozempic for now   -AC/HS glucose with SSI coverage  -Hypoglycemia protocol       -Monitor blood sugars, stable          Dyslipidemia  -Continue statin      DVT Risk  -Lovenox resumed, plts are low, but this is a chronic persistent issue for her, monitor closely, stable.   -SCDs.      Plan  ID and Plastics on board.   Continue IV abx- plan for dapto and cefepime at dc. PICC placed.    POD #3 implant removal. Wound vac in place, plan to continue this at home.  Pain control, improving with current pain meds.   Wound culture pending.   SS on board, needs HHC and home infusions arranged.   Anticipate DC in AM.         SNOW Metz-CNP

## 2023-11-30 NOTE — CARE PLAN
Problem: Diabetes  Goal: Achieve decreasing blood glucose levels by end of shift  Outcome: Progressing  Goal: Increase stability of blood glucose readings by end of shift  Outcome: Progressing  Goal: Maintain glucose levels >70mg/dl to <250mg/dl throughout shift  Outcome: Progressing  Goal: No changes in neurological exam by end of shift  Outcome: Progressing  Goal: Vital signs within normal range for age by end of shift  Outcome: Progressing     Problem: Skin  Goal: Participates in plan/prevention/treatment measures  Outcome: Progressing  Goal: Prevent/minimize sheer/friction injuries  Outcome: Progressing  Goal: Promote/optimize nutrition  Outcome: Progressing     Problem: Pain  Goal: My pain/discomfort is manageable  Outcome: Progressing     Problem: Safety  Goal: Patient will be injury free during hospitalization  Outcome: Progressing  Goal: I will remain free of falls  Outcome: Progressing     Problem: Daily Care  Goal: Daily care needs are met  Outcome: Progressing     Problem: Psychosocial Needs  Goal: Demonstrates ability to cope with hospitalization/illness  Outcome: Progressing     Problem: Discharge Barriers  Goal: My discharge needs are met  Outcome: Progressing     Problem: Pain  Goal: Takes deep breaths with improved pain control throughout the shift  Outcome: Progressing  Goal: Turns in bed with improved pain control throughout the shift  Outcome: Progressing  Goal: Walks with improved pain control throughout the shift  Outcome: Progressing  Goal: Performs ADL's with improved pain control throughout shift  Outcome: Progressing  Goal: Free from opioid side effects throughout the shift  Outcome: Progressing  Goal: Free from acute confusion related to pain meds throughout the shift  Outcome: Progressing   The patient's goals for the shift include  pain management    The clinical goals for the shift include pain management, IV antibiotics      11/30/23 at 5:19 AM - Mandi Jarrell RN

## 2023-11-30 NOTE — PROGRESS NOTES
Awaiting final culture results.  Patient has PICC and will dc with home antibiotics.  shannon Cortes, following.  Patient has had them in past.  Sophie Gallardo RN

## 2023-11-30 NOTE — PROGRESS NOTES
PCN received message from  infusion team and patient is 100% covered. Patient aware. Awaiting completion of [facility] orders for processing for SOC. SCOTTIE Preciado aware.      Sebastian Foote

## 2023-11-30 NOTE — CARE PLAN
Problem: Diabetes  Goal: Achieve decreasing blood glucose levels by end of shift  Outcome: Progressing  Goal: Increase stability of blood glucose readings by end of shift  Outcome: Progressing  Goal: Decrease in ketones present in urine by end of shift  Outcome: Progressing  Goal: Maintain electrolyte levels within acceptable range throughout shift  Outcome: Progressing  Goal: Maintain glucose levels >70mg/dl to <250mg/dl throughout shift  Outcome: Progressing  Goal: No changes in neurological exam by end of shift  Outcome: Progressing  Goal: Learn about and adhere to nutrition recommendations by end of shift  Outcome: Progressing  Goal: Vital signs within normal range for age by end of shift  Outcome: Progressing  Goal: Increase self care and/or family involovement by end of shift  Outcome: Progressing  Goal: Receive DSME education by end of shift  Outcome: Progressing     Problem: Skin  Goal: Decreased wound size/increased tissue granulation at next dressing change  Outcome: Progressing  Goal: Participates in plan/prevention/treatment measures  Outcome: Progressing  Goal: Prevent/manage excess moisture  Outcome: Progressing  Goal: Prevent/minimize sheer/friction injuries  Outcome: Progressing  Goal: Promote/optimize nutrition  Outcome: Progressing  Goal: Promote skin healing  Outcome: Progressing     Problem: Pain  Goal: My pain/discomfort is manageable  Outcome: Progressing     Problem: Safety  Goal: Patient will be injury free during hospitalization  Outcome: Progressing  Goal: I will remain free of falls  Outcome: Progressing     Problem: Daily Care  Goal: Daily care needs are met  Outcome: Progressing     Problem: Psychosocial Needs  Goal: Demonstrates ability to cope with hospitalization/illness  Outcome: Progressing  Goal: Collaborate with me, my family, and caregiver to identify my specific goals  Outcome: Progressing     Problem: Discharge Barriers  Goal: My discharge needs are met  Outcome:  Progressing     Problem: Pain  Goal: Takes deep breaths with improved pain control throughout the shift  Outcome: Progressing  Goal: Turns in bed with improved pain control throughout the shift  Outcome: Progressing  Goal: Walks with improved pain control throughout the shift  Outcome: Progressing  Goal: Performs ADL's with improved pain control throughout shift  Outcome: Progressing  Goal: Participates in PT with improved pain control throughout the shift  Outcome: Progressing  Goal: Free from opioid side effects throughout the shift  Outcome: Progressing  Goal: Free from acute confusion related to pain meds throughout the shift  Outcome: Progressing   The patient's goals for the shift include      The clinical goals for the shift include pain management, IV antibiotics

## 2023-11-30 NOTE — PROGRESS NOTES
PCN received message from  infusion intake Lakshmi and pharmacy currently running cost out for medications. Awaiting response.      Sebastian Foote

## 2023-12-01 ENCOUNTER — TELEPHONE (OUTPATIENT)
Dept: SURGICAL ONCOLOGY | Facility: CLINIC | Age: 58
End: 2023-12-01
Payer: COMMERCIAL

## 2023-12-01 ENCOUNTER — APPOINTMENT (OUTPATIENT)
Dept: WOUND CARE | Facility: HOSPITAL | Age: 58
End: 2023-12-01
Payer: COMMERCIAL

## 2023-12-01 ENCOUNTER — PHARMACY VISIT (OUTPATIENT)
Dept: PHARMACY | Facility: CLINIC | Age: 58
End: 2023-12-01
Payer: COMMERCIAL

## 2023-12-01 ENCOUNTER — HOME INFUSION (OUTPATIENT)
Dept: INFUSION THERAPY | Age: 58
End: 2023-12-01
Payer: COMMERCIAL

## 2023-12-01 VITALS
HEART RATE: 70 BPM | OXYGEN SATURATION: 95 % | BODY MASS INDEX: 33.32 KG/M2 | SYSTOLIC BLOOD PRESSURE: 120 MMHG | TEMPERATURE: 97.2 F | DIASTOLIC BLOOD PRESSURE: 69 MMHG | RESPIRATION RATE: 16 BRPM | WEIGHT: 188.05 LBS | HEIGHT: 63 IN

## 2023-12-01 DIAGNOSIS — Z85.3 HISTORY OF BREAST CANCER: ICD-10-CM

## 2023-12-01 LAB
ANION GAP SERPL CALC-SCNC: 10 MMOL/L
BUN SERPL-MCNC: 32 MG/DL (ref 8–25)
CALCIUM SERPL-MCNC: 9.1 MG/DL (ref 8.5–10.4)
CHLORIDE SERPL-SCNC: 102 MMOL/L (ref 97–107)
CO2 SERPL-SCNC: 23 MMOL/L (ref 24–31)
CREAT SERPL-MCNC: 1.2 MG/DL (ref 0.4–1.6)
ERYTHROCYTE [DISTWIDTH] IN BLOOD BY AUTOMATED COUNT: 14.4 % (ref 11.5–14.5)
GFR SERPL CREATININE-BSD FRML MDRD: 53 ML/MIN/1.73M*2
GLUCOSE BLD MANUAL STRIP-MCNC: 109 MG/DL (ref 74–99)
GLUCOSE BLD MANUAL STRIP-MCNC: 122 MG/DL (ref 74–99)
GLUCOSE SERPL-MCNC: 119 MG/DL (ref 65–99)
HCT VFR BLD AUTO: 31.7 % (ref 36–46)
HGB BLD-MCNC: 10.4 G/DL (ref 12–16)
MCH RBC QN AUTO: 32.2 PG (ref 26–34)
MCHC RBC AUTO-ENTMCNC: 32.8 G/DL (ref 32–36)
MCV RBC AUTO: 98 FL (ref 80–100)
NRBC BLD-RTO: 0 /100 WBCS (ref 0–0)
PLATELET # BLD AUTO: 101 X10*3/UL (ref 150–450)
POTASSIUM SERPL-SCNC: 4.3 MMOL/L (ref 3.4–5.1)
RBC # BLD AUTO: 3.23 X10*6/UL (ref 4–5.2)
SODIUM SERPL-SCNC: 135 MMOL/L (ref 133–145)
WBC # BLD AUTO: 6.2 X10*3/UL (ref 4.4–11.3)

## 2023-12-01 PROCEDURE — 2500000004 HC RX 250 GENERAL PHARMACY W/ HCPCS (ALT 636 FOR OP/ED): Performed by: NURSE PRACTITIONER

## 2023-12-01 PROCEDURE — 82947 ASSAY GLUCOSE BLOOD QUANT: CPT

## 2023-12-01 PROCEDURE — 2500000001 HC RX 250 WO HCPCS SELF ADMINISTERED DRUGS (ALT 637 FOR MEDICARE OP): Performed by: NURSE PRACTITIONER

## 2023-12-01 PROCEDURE — 2500000001 HC RX 250 WO HCPCS SELF ADMINISTERED DRUGS (ALT 637 FOR MEDICARE OP)

## 2023-12-01 PROCEDURE — 2500000001 HC RX 250 WO HCPCS SELF ADMINISTERED DRUGS (ALT 637 FOR MEDICARE OP): Performed by: SPECIALIST

## 2023-12-01 PROCEDURE — 80048 BASIC METABOLIC PNL TOTAL CA: CPT | Performed by: NURSE PRACTITIONER

## 2023-12-01 PROCEDURE — 2500000004 HC RX 250 GENERAL PHARMACY W/ HCPCS (ALT 636 FOR OP/ED): Performed by: INTERNAL MEDICINE

## 2023-12-01 PROCEDURE — 96372 THER/PROPH/DIAG INJ SC/IM: CPT | Performed by: NURSE PRACTITIONER

## 2023-12-01 PROCEDURE — RXMED WILLOW AMBULATORY MEDICATION CHARGE

## 2023-12-01 PROCEDURE — 85027 COMPLETE CBC AUTOMATED: CPT | Performed by: NURSE PRACTITIONER

## 2023-12-01 RX ORDER — FLUTICASONE PROPIONATE 50 MCG
2 SPRAY, SUSPENSION (ML) NASAL DAILY
Qty: 16 G | Refills: 0 | Status: ON HOLD | OUTPATIENT
Start: 2023-12-02 | End: 2024-04-24 | Stop reason: ALTCHOICE

## 2023-12-01 RX ADMIN — OXYCODONE AND ACETAMINOPHEN 2 TABLET: 5; 325 TABLET ORAL at 05:56

## 2023-12-01 RX ADMIN — OXYCODONE AND ACETAMINOPHEN 2 TABLET: 5; 325 TABLET ORAL at 10:09

## 2023-12-01 RX ADMIN — IBUPROFEN 600 MG: 600 TABLET ORAL at 13:25

## 2023-12-01 RX ADMIN — CEFEPIME 2 G: 2 INJECTION, POWDER, FOR SOLUTION INTRAVENOUS at 08:28

## 2023-12-01 RX ADMIN — SERTRALINE 100 MG: 100 TABLET, FILM COATED ORAL at 08:29

## 2023-12-01 RX ADMIN — OXYCODONE AND ACETAMINOPHEN 2 TABLET: 5; 325 TABLET ORAL at 00:55

## 2023-12-01 RX ADMIN — FLUTICASONE PROPIONATE 2 SPRAY: 50 SPRAY, METERED NASAL at 09:00

## 2023-12-01 RX ADMIN — DIPHENHYDRAMINE HYDROCHLORIDE 25 MG: 25 TABLET ORAL at 14:23

## 2023-12-01 RX ADMIN — DIPHENHYDRAMINE HYDROCHLORIDE 25 MG: 25 TABLET ORAL at 05:56

## 2023-12-01 RX ADMIN — GABAPENTIN 400 MG: 400 CAPSULE ORAL at 08:29

## 2023-12-01 RX ADMIN — GABAPENTIN 400 MG: 400 CAPSULE ORAL at 14:22

## 2023-12-01 RX ADMIN — HYDROCHLOROTHIAZIDE 12.5 MG: 12.5 CAPSULE ORAL at 08:29

## 2023-12-01 RX ADMIN — METOPROLOL SUCCINATE 50 MG: 50 TABLET, EXTENDED RELEASE ORAL at 08:29

## 2023-12-01 RX ADMIN — DAPTOMYCIN 250 MG: 350 INJECTION, POWDER, LYOPHILIZED, FOR SOLUTION INTRAVENOUS at 10:10

## 2023-12-01 RX ADMIN — AMLODIPINE BESYLATE 10 MG: 10 TABLET ORAL at 08:29

## 2023-12-01 RX ADMIN — CEFEPIME 2 G: 2 INJECTION, POWDER, FOR SOLUTION INTRAVENOUS at 00:09

## 2023-12-01 RX ADMIN — IBUPROFEN 600 MG: 600 TABLET ORAL at 06:01

## 2023-12-01 RX ADMIN — ENOXAPARIN SODIUM 40 MG: 40 INJECTION SUBCUTANEOUS at 08:30

## 2023-12-01 RX ADMIN — Medication 38 MG OF IRON: at 08:00

## 2023-12-01 RX ADMIN — LOSARTAN POTASSIUM 50 MG: 50 TABLET, FILM COATED ORAL at 08:29

## 2023-12-01 RX ADMIN — OXYCODONE HYDROCHLORIDE AND ACETAMINOPHEN 250 MG: 500 TABLET ORAL at 08:29

## 2023-12-01 RX ADMIN — LORATADINE 10 MG: 10 TABLET ORAL at 08:29

## 2023-12-01 ASSESSMENT — PAIN SCALES - GENERAL
PAINLEVEL_OUTOF10: 0 - NO PAIN
PAINLEVEL_OUTOF10: 5 - MODERATE PAIN
PAINLEVEL_OUTOF10: 0 - NO PAIN
PAINLEVEL_OUTOF10: 7
PAINLEVEL_OUTOF10: 3
PAINLEVEL_OUTOF10: 5 - MODERATE PAIN
PAINLEVEL_OUTOF10: 7
PAINLEVEL_OUTOF10: 7

## 2023-12-01 ASSESSMENT — PAIN - FUNCTIONAL ASSESSMENT
PAIN_FUNCTIONAL_ASSESSMENT: FLACC (FACE, LEGS, ACTIVITY, CRY, CONSOLABILITY)
PAIN_FUNCTIONAL_ASSESSMENT: 0-10

## 2023-12-01 ASSESSMENT — PAIN DESCRIPTION - ORIENTATION
ORIENTATION: LEFT
ORIENTATION: LEFT

## 2023-12-01 ASSESSMENT — PAIN DESCRIPTION - LOCATION
LOCATION: BREAST
LOCATION: BREAST

## 2023-12-01 NOTE — PROGRESS NOTES
DIAGNOSIS L breast cellulitis  Allergies   Allergen Reactions    Other Other     Anesthesia IV Set MISC- Nausea    Piperacillin-Tazobactam Swelling, Hives and Itching    Vancomycin Itching and Rash      REVIEW OF LABS AT DISCHARGE  LINE INFO: PT HAS A SL PICC TO BE MANAGED PER [facility] PROTOCOL  PT ORDERED cefepime 2 gm q12 and daptomycin 250 mg q24 THRU 12/11  LABS ORDERED INCLUDE CBC/diff, CMP, CK  SYSTEM IV push  CARE PLAN DONE    Sabrina Michaud IS A 58 y.o. female ORDERED cefepime and daptomycin  FOLLOWED BY Dr Harris    Pt returns to [facility] service for continued IV ABX therapy. She is familiar with infusions as ordered and verbalizes understanding that daptomycin is now a lower dose. Per [facility], she will discharge from hospital today, needing tonight's dose of cefepime. She is agreeable to 1 week of meds with supplies by about 8 pm tonight so she can infuse her dose. Ok to leave on front porch table if no one home.    RX DISPENSED THE FOLLOWING WITH SUPPLIES TO MATCH:  15x cefepime syringes (one off ice)  7x daptomycin syringes  DOS 12/1-12/8    FOLLOW UP 12/8 PROGRESS, LABS, RMDR STRAIGHT

## 2023-12-01 NOTE — PROGRESS NOTES
Sabrina Michaud is a 58 y.o. female on day 5 of admission presenting with Cellulitis.    Subjective   Interval History:       Afebrile, no chills  Awake, alert  No chest pain or shortness of breath  No nausea vomiting or diarrhea  Has allergic reaction to vancomycin-discontinued,    Review of Systems   All other systems reviewed and are negative.      Objective   Range of Vitals (last 24 hours)  Heart Rate:  [62-73]   Temp:  [35.8 °C (96.4 °F)-36.3 °C (97.3 °F)]   Resp:  [16-17]   BP: (108-146)/(61-71)   SpO2:  [95 %-97 %]   Daily Weight  11/25/23 : 85.3 kg (188 lb 0.8 oz)    Body mass index is 33.32 kg/m².    Physical Exam    Constitutional:       Appearance: Normal appearance.   HENT:      Head: Normocephalic and atraumatic.      Nose: Nose normal.      Mouth/Throat:      Mouth: Mucous membranes are moist.      Pharynx: Oropharynx is clear.   Eyes:      Extraocular Movements: Extraocular movements intact.      Conjunctiva/sclera: Conjunctivae normal.   Cardiovascular:      Rate and Rhythm: Normal rate and regular rhythm.   Pulmonary:      Effort: Pulmonary effort is normal.      Breath sounds: Normal breath sounds.   Abdominal:      General: Bowel sounds are normal.      Palpations: Abdomen is soft.   Musculoskeletal:         General: Normal range of motion.      Cervical back: Normal range of motion and neck supple.   Skin:     Comments: Left chest NPWTD   Neurological:      General: No focal deficit present.      Mental Status: She is alert and oriented to person, place, and time. Mental status is at baseline.   Psychiatric:         Mood and Affect: Mood normal.         Behavior: Behavior normal.   Antibiotics  DAPTOmycin (Cubicin) 500 mg/50 mL  mg  cefepime (Maxipime) 2 g in dextrose 5 % 100 mL IV  amLODIPine (Norvasc) tablet 10 mg  ascorbic acid (Vitamin C) tablet 250 mg  atorvastatin (Lipitor) tablet 20 mg  ferrous gluconate (Fergon) 324 (38 Fe) mg tablet 38 mg of iron  gabapentin (Neurontin) capsule 400  mg  losartan 50 mg, hydroCHLOROthiazide 12.5 mg for Hyzaar 50/12.5  metoprolol succinate XL (Toprol-XL) 24 hr tablet 50 mg  sertraline (Zoloft) tablet 100 mg  sitaGLIPtin phos-metformin (Janumet)  mg per tablet 1 tablet  enoxaparin (Lovenox) syringe 40 mg  acetaminophen (Tylenol) tablet 650 mg  acetaminophen (Tylenol) tablet 650 mg  polyethylene glycol (Glycolax, Miralax) packet 17 g  benzocaine-menthol (Cepastat Sore Throat) 15-3.6 mg lozenge 1 lozenge  dextromethorphan-guaifenesin (Robitussin DM)  mg/5 mL oral liquid 5 mL  dextrose 50 % injection 25 g  glucagon (Glucagen) injection 1 mg  dextrose 10 % in water (D10W) infusion  insulin lispro (HumaLOG) injection 0-5 Units  cefepime (Maxipime) 2 g in dextrose 5 % 100 mL IV  DAPTOmycin (Cubicin) 500 mg/50 mL  mg  iohexol (OMNIPaque) 350 mg iodine/mL solution 75 mL  enoxaparin (Lovenox) syringe 40 mg  losartan (Cozaar) tablet 50 mg  hydroCHLOROthiazide (Microzide) capsule 12.5 mg  dextrose 5 % and lactated Ringer's infusion  celecoxib (CeleBREX) capsule 400 mg  acetaminophen (Tylenol) tablet 650 mg  ondansetron ODT (Zofran-ODT) disintegrating tablet 4 mg  chlorhexidine (Hibiclens) 4 % liquid 1 Application  DAPTOmycin (Cubicin) 400 mg in sodium chloride 0.9% 50 mL IV  lidocaine-epinephrine (Xylocaine W/EPI) injection  - Omnicell Override Pull  bupivacaine PF (Marcaine) injection  - Omnicell Override Pull  oxygen (O2) therapy  lactated Ringer's infusion  acetaminophen (Tylenol) tablet 650 mg  HYDROmorphone PF (Dilaudid) injection 0.2 mg  oxyCODONE (Roxicodone) immediate release tablet 5 mg  HYDROmorphone (Dilaudid) injection 0.4 mg  oxyCODONE (Roxicodone) immediate release tablet 10 mg  ondansetron (Zofran) injection 4 mg  promethazine (Phenergan) 6.25 mg in sodium chloride 0.9% 50 mL IV  albuterol 2.5 mg /3 mL (0.083 %) nebulizer solution 2.5 mg  BUPivacaine-EPINEPHrine (Marcaine w/EPI) 0.25 %-1:200,000 injection  lidocaine-epinephrine (Xylocaine  W/EPI) 1 %-1:100,000 injection  bupivacaine PF (Marcaine) 0.5 % (5 mg/mL) injection  tranexamic acid (Cyklokapron) injection  tranexamic acid (Cyklokapron) injection  - Omnicell Override Pull  HYDROmorphone (Dilaudid) injection  - Omnicell Override Pull  HYDROmorphone (Dilaudid) injection 0.4 mg  enoxaparin (Lovenox) syringe 40 mg  HYDROmorphone (Dilaudid) injection 0.2 mg  oxyCODONE-acetaminophen (Percocet) 5-325 mg per tablet 2 tablet  ibuprofen tablet 600 mg  traMADol (Ultram) tablet 50 mg  sitaGLIPtin phos-metformin (Janumet)  mg per tablet 1 tablet  oxyCODONE-acetaminophen (Percocet) 5-325 mg per tablet 1 tablet  enoxaparin (Lovenox) injection 40 mg  oxyCODONE-acetaminophen (Percocet) tablet 5-325 mg  ondansetron (Zofran) tablet  insulin lispro (HumaLOG) injection 0-10 Units  ondansetron (Zofran) injection 4 mg  loratadine (Claritin) tablet 10 mg  fluticasone (Flonase) nasal spray 2 spray  enoxaparin (Lovenox) syringe 40 mg  lidocaine (Xylocaine) 10 mg/mL (1 %) injection 5 mL  alteplase (Cathflo Activase) injection 2 mg  diphenhydrAMINE (Sominex) tablet 25 mg  vancomycin-diluent combo no.1 (Xellia) IVPB 1,250 mg  vancomycin-diluent combo no.1 (Xellia) IVPB 1,250 mg  diphenhydrAMINE (BENADryl) capsule 25 mg  DAPTOmycin (Cubicin) 250 mg in sodium chloride 0.9% 50 mL IV  DAPTOmycin (Cubicin) vial 500 mg    diphenhydrAMINE (Sominex) tablet 25 mg      Relevant Results  Labs  Results from last 72 hours   Lab Units 11/30/23  0506 11/29/23  0439 11/28/23  0430   WBC AUTO x10*3/uL 5.9 4.4 5.7   HEMOGLOBIN g/dL 10.3* 10.8* 10.9*   HEMATOCRIT % 31.6* 34.2* 34.9*   PLATELETS AUTO x10*3/uL 85* 77* 92*     Results from last 72 hours   Lab Units 11/30/23  0506 11/29/23  0439 11/28/23  0430   SODIUM mmol/L 135 138 138   POTASSIUM mmol/L 3.8 3.6 3.9   CHLORIDE mmol/L 102 105 107   CO2 mmol/L 23* 24 22*   BUN mg/dL 19 16 14   CREATININE mg/dL 0.70 0.60 0.50   GLUCOSE mg/dL 126* 141* 117*   CALCIUM mg/dL 9.1 9.2 8.9   ANION  GAP mmol/L 10 9 9   EGFR mL/min/1.73m*2 >90 >90 >90         Estimated Creatinine Clearance: 90.7 mL/min (by C-G formula based on SCr of 0.7 mg/dL).  CRP   Date Value Ref Range Status   06/01/2022 1.02 (A) mg/dL Final     Comment:     REF VALUE  < 1.00     05/31/2022 0.77 mg/dL Final     Comment:     REF VALUE  < 1.00     05/20/2021 1.21 (A) mg/dL Final     Comment:     REF VALUE  < 1.00       Microbiology  11/27-negative wound culture  Imaging  CT chest w IV contrast    Result Date: 11/25/2023  Interpreted By:  Walker Figueroa, STUDY: CT CHEST W IV CONTRAST; 11/25/2023 7:25 pm   INDICATION: Signs/Symptoms:abscess. Redness and warmth left breast. Patient has had 2 prior surgeries for infection previous mastectomy, reconstruction site.   COMPARISON: 08/17/2022   ACCESSION NUMBER(S): [id number]   ORDERING CLINICIAN: YANNA CHAMORRO   TECHNIQUE: The examination was performed with the intravenous injection of 75 ml of non-ionic iodinated contrast was injected intravenously.   A helical acquisition data was obtained with sagittal coronal reconstructions performed.   One or more of the following dose reduction techniques were used: Automated exposure control Adjustment of the mA and/or kV according to patient size, and/or use of iterative reconstruction technique.   FINDINGS: There is no hilar or mediastinal lymphadenopathy present.  No infiltrates or effusions are identified.   Chest Wall: There are bilateral breast implants present. There is no evidence for a chest wall abscess. There is a hazy appearance of the fat surrounding the implants bilaterally, nonspecific in appearance. It is unclear whether these findings are postsurgical in nature or whether the air indicative of underlying infection. Cortney-implant haziness is slightly more pronounced on the left compared to the right, particularly inferiorly and medially.   There is a 2.2 cm rounded lesion with fluid attenuation within the left axilla possibly representing  a small residual postoperative seroma/hematoma from axillary lymph node resection. Abscess is not entirely excluded although there is no surrounding haziness of the adjacent fat making this somewhat less likely.   Upper Abdominal Findings: There is a nodular appearance of the hepatic contour consistent with cirrhosis. There is recanalization of the umbilical vein consistent with portal venous hypertension. Splenomegaly is also partially included within the field of view, consistent with portal venous hypertension. There are few too small to characterize hypodense foci within the right and left lobes of the liver most likely representing cysts with the largest measuring 1.4 cm within the left lobe. These are unchanged compared to the prior study from 08/17/2022.   Skeletal System: No fractures or destructive lesions are identified.       1. There is a small, 2.2 cm rounded area of fluid attenuation within the left axilla possibly representing a small hematoma/seroma from axillary lymph node resection. 2. Bilateral breast implants are present with haziness of the surrounding fat with the haziness slightly more pronounced surrounding the left breast implant. A discrete abscess is not identified adjacent to either implant. 3. Cirrhosis with portal venous hypertension and splenomegaly as noted above.   MACRO: none   Signed by: Walker Figueroa 11/25/2023 8:04 PM Dictation workstation:   [id number]        Assessment/Plan   Left breast cellulitis  Left breast prosthetic implant- infected  Diabetes Mellitus  Previous culture with pseudomonas, Corynebacterium-possible contaminant  Allergy listed to vancomycin, zosyn     IV Cefepime  Discontinue vancomycin  IV daptomycin  PICC line placement-done  Monitor temp and WBC  Local care  Long-term plan is IV daptomycin and cefepime for total of 14 days  Discharge planning    Sundeep Harris MD

## 2023-12-01 NOTE — CARE PLAN
Problem: Diabetes  Goal: Achieve decreasing blood glucose levels by end of shift  Outcome: Progressing  Goal: Increase stability of blood glucose readings by end of shift  Outcome: Progressing  Goal: Decrease in ketones present in urine by end of shift  Outcome: Progressing  Goal: Maintain electrolyte levels within acceptable range throughout shift  Outcome: Progressing  Goal: Maintain glucose levels >70mg/dl to <250mg/dl throughout shift  Outcome: Progressing  Goal: No changes in neurological exam by end of shift  Outcome: Progressing  Goal: Learn about and adhere to nutrition recommendations by end of shift  Outcome: Progressing  Goal: Vital signs within normal range for age by end of shift  Outcome: Progressing  Goal: Increase self care and/or family involovement by end of shift  Outcome: Progressing  Goal: Receive DSME education by end of shift  Outcome: Progressing     Problem: Skin  Goal: Decreased wound size/increased tissue granulation at next dressing change  Outcome: Progressing  Goal: Participates in plan/prevention/treatment measures  Outcome: Progressing  Goal: Prevent/manage excess moisture  Outcome: Progressing  Goal: Prevent/minimize sheer/friction injuries  Outcome: Progressing  Goal: Promote/optimize nutrition  Outcome: Progressing  Goal: Promote skin healing  Outcome: Progressing     Problem: Pain  Goal: My pain/discomfort is manageable  Outcome: Progressing     Problem: Safety  Goal: Patient will be injury free during hospitalization  Outcome: Progressing  Goal: I will remain free of falls  Outcome: Progressing     Problem: Daily Care  Goal: Daily care needs are met  Outcome: Progressing     Problem: Psychosocial Needs  Goal: Demonstrates ability to cope with hospitalization/illness  Outcome: Progressing  Goal: Collaborate with me, my family, and caregiver to identify my specific goals  Outcome: Progressing     Problem: Discharge Barriers  Goal: My discharge needs are met  Outcome:  Progressing     Problem: Pain  Goal: Takes deep breaths with improved pain control throughout the shift  Outcome: Progressing  Goal: Turns in bed with improved pain control throughout the shift  Outcome: Progressing  Goal: Walks with improved pain control throughout the shift  Outcome: Progressing  Goal: Performs ADL's with improved pain control throughout shift  Outcome: Progressing  Goal: Participates in PT with improved pain control throughout the shift  Outcome: Progressing  Goal: Free from opioid side effects throughout the shift  Outcome: Progressing  Goal: Free from acute confusion related to pain meds throughout the shift  Outcome: Progressing     Problem: Chronic Conditions and Co-morbidities  Goal: Patient's chronic conditions and co-morbidity symptoms are monitored and maintained or improved  Outcome: Progressing   The patient's goals for the shift include      The clinical goals for the shift include Pain management, IV antibotics

## 2023-12-01 NOTE — CARE PLAN
Problem: Diabetes  Goal: Achieve decreasing blood glucose levels by end of shift  Outcome: Progressing  Goal: Increase stability of blood glucose readings by end of shift  Outcome: Progressing  Goal: Maintain glucose levels >70mg/dl to <250mg/dl throughout shift  Outcome: Progressing  Goal: No changes in neurological exam by end of shift  Outcome: Progressing  Goal: Vital signs within normal range for age by end of shift  Outcome: Progressing     Problem: Skin  Goal: Participates in plan/prevention/treatment measures  Outcome: Progressing  Flowsheets (Taken 12/1/2023 0239)  Participates in plan/prevention/treatment measures:   Elevate heels   Increase activity/out of bed for meals  Goal: Prevent/manage excess moisture  Outcome: Progressing  Flowsheets (Taken 12/1/2023 0239)  Prevent/manage excess moisture: Monitor for/manage infection if present  Goal: Prevent/minimize sheer/friction injuries  Outcome: Progressing  Flowsheets (Taken 12/1/2023 0239)  Prevent/minimize sheer/friction injuries: Increase activity/out of bed for meals  Goal: Promote/optimize nutrition  Outcome: Progressing  Flowsheets (Taken 12/1/2023 0239)  Promote/optimize nutrition:   Monitor/record intake including meals   Offer water/supplements/favorite foods     Problem: Pain  Goal: My pain/discomfort is manageable  Outcome: Progressing  Flowsheets (Taken 12/1/2023 0239)  Resident's pain/discomfort is manageable:   Include resident/family/caregiver in decisions related to pain management   Administer pain medication prior to activities that may trigger pain   Identify and avoid pain triggers   Offer non-pharmacological pain management interventions     Problem: Safety  Goal: Patient will be injury free during hospitalization  Outcome: Progressing  Goal: I will remain free of falls  Outcome: Progressing  Flowsheets (Taken 12/1/2023 0239)  Resident will remain free of falls:   Assess and monitor medications that may increase fall risk   Visual  checks per facility policy     Problem: Daily Care  Goal: Daily care needs are met  Outcome: Progressing  Flowsheets (Taken 12/1/2023 0239)  Daily care needs are met:   Assess and monitor ability to perform self care and identify potential discharge needs   Encourage independent activity per ability     Problem: Psychosocial Needs  Goal: Demonstrates ability to cope with hospitalization/illness  Outcome: Progressing  Flowsheets (Taken 12/1/2023 0239)  Demonstrates ability to cope with hospitalization/illness: Encourage verbalization of feelings/concerns/expectations     Problem: Discharge Barriers  Goal: My discharge needs are met  Outcome: Progressing  Flowsheets (Taken 12/1/2023 0239)  Resident's discharge needs are met:   Identify potential discharge barriers on admission and throughout stay   Involve resident/family/caregiver in discharge planning process     Problem: Pain  Goal: Takes deep breaths with improved pain control throughout the shift  Outcome: Progressing  Goal: Turns in bed with improved pain control throughout the shift  Outcome: Progressing  Goal: Walks with improved pain control throughout the shift  Outcome: Progressing  Goal: Performs ADL's with improved pain control throughout shift  Outcome: Progressing  Goal: Free from opioid side effects throughout the shift  Outcome: Progressing  Goal: Free from acute confusion related to pain meds throughout the shift  Outcome: Progressing     Problem: Chronic Conditions and Co-morbidities  Goal: Patient's chronic conditions and co-morbidity symptoms are monitored and maintained or improved  Outcome: Progressing  Flowsheets (Taken 12/1/2023 0239)  Care Plan - Patient's Chronic Conditions and Co-Morbidity Symptoms are Monitored and Maintained or Improved:   Monitor and assess patient's chronic conditions and comorbid symptoms for stability, deterioration, or improvement   Collaborate with multidisciplinary team to address chronic and comorbid conditions  and prevent exacerbation or deterioration   Update acute care plan with appropriate goals if chronic or comorbid symptoms are exacerbated and prevent overall improvement and discharge     The patient's goals for the shift include  rest    The clinical goals for the shift include Pain management, IV antibotics      12/01/23 at 2:44 AM - Mandi Jarrell RN

## 2023-12-01 NOTE — PROGRESS NOTES
Patient is cleared by  HH and pharmacy for discharge.  Meds to be delivered today.  Patient has given antibiotics to herself in the past.  OK to discharge today.  Nurse, April, updated    Sophie Gallardo RN

## 2023-12-01 NOTE — DISCHARGE SUMMARY
Discharge Diagnosis  Cellulitis    Issues Requiring Follow-Up  Cellulitis    Discharge Meds     Your medication list        START taking these medications        Instructions Last Dose Given Next Dose Due   cefepime 1 gram/50 mL IV  Commonly known as: Maxipime      Infuse 100 mL (2 g) at 200 mL/hr over 30 minutes into a venous catheter every 12 hours for 11 days.       DAPTOmycin 500 mg injection  Commonly known as: Cubicin      Infuse 250 mg over 30 minutes into a venous catheter once daily for 11 days.       fluticasone 50 mcg/actuation nasal spray  Commonly known as: Flonase  Start taking on: December 2, 2023      Administer 2 sprays into each nostril once daily. Shake gently. Before first use, prime pump. After use, clean tip and replace cap. Do not start before December 2, 2023.       oxyCODONE-acetaminophen 5-325 mg tablet  Commonly known as: Percocet      Take 1 tablet by mouth every 6 hours if needed for severe pain (7 - 10).              CHANGE how you take these medications        Instructions Last Dose Given Next Dose Due   amLODIPine 10 mg tablet  Commonly known as: Norvasc  What changed: additional instructions      Take 1 tablet (10 mg) by mouth once daily. No further refills until seen in office       losartan-hydrochlorothiazide 50-12.5 mg tablet  Commonly known as: Hyzaar  What changed: additional instructions      Take 1 tablet by mouth once daily. No further refills until seen in office       metoprolol succinate XL 50 mg 24 hr tablet  Commonly known as: Toprol-XL  What changed: additional instructions      Take 1 tablet (50 mg) by mouth once daily. No further refills until seen in office       ondansetron 8 mg tablet  Commonly known as: Zofran  What changed: Another medication with the same name was added. Make sure you understand how and when to take each.      Take 1 tablet (8 mg) by mouth every 8 hours if needed for nausea or vomiting.       ondansetron 8 mg tablet  Commonly known as:  Zofran  What changed: You were already taking a medication with the same name, and this prescription was added. Make sure you understand how and when to take each.      Take 1 tablet (8 mg) by mouth every 8 hours if needed for nausea or vomiting.       potassium chloride CR 20 mEq ER tablet  Commonly known as: Klor-Con M20  What changed: additional instructions      Take 1 tablet (20 mEq) by mouth 2 times a day. No further refills until seen in office       sertraline 100 mg tablet  Commonly known as: Zoloft  What changed: additional instructions      Take 1 tablet (100 mg) by mouth 2 times a day. No further refills until seen in office              CONTINUE taking these medications        Instructions Last Dose Given Next Dose Due   ascorbic acid 250 mg tablet  Commonly known as: Vitamin C           atorvastatin 20 mg tablet  Commonly known as: Lipitor      Take 1 tablet (20 mg) by mouth once daily.       enoxaparin 40 mg/0.4 mL syringe  Commonly known as: Lovenox      Inject 0.4 mL (40 mg) under the skin once daily for 10 days.       ferrous gluconate 324 (38 Fe) mg tablet  Commonly known as: Fergon      TAKE 1 TABLET IN THE MORNING AND 1 TABLET BEFORE BEDTIME       gabapentin 400 mg capsule  Commonly known as: Neurontin      Take 1 capsule (400 mg) by mouth 3 times a day.       Janumet  mg tablet  Generic drug: sitaGLIPtin phos-metformin      Take 1 tablet by mouth 2 times a day with meals.       Ozempic 0.25 mg or 0.5 mg (2 mg/3 mL) pen injector  Generic drug: semaglutide      INJECT 0.25 MG UNDER THE SKIN ONCE WEEKLY FOR 1 MONTH, THEN INCREASE TO 0.5MG WEEKLY                 Where to Get Your Medications        These medications were sent to [facility] - [address] [address]      Phone: [phone]   amLODIPine 10 mg tablet  losartan-hydrochlorothiazide 50-12.5 mg tablet  metoprolol succinate XL 50 mg 24 hr tablet  potassium  chloride CR 20 mEq ER tablet  sertraline 100 mg tablet       These medications were sent to  [facility]  [address], [facility] [address]      Phone: [phone]   cefepime 1 gram/50 mL IV  DAPTOmycin 500 mg injection       These medications were sent to  [facility]  [address], [address], [facility] [address]      Hours: 9 AM to 6 PM Mon-Fri, 9 AM to 1 PM Sat Phone: [phone]   enoxaparin 40 mg/0.4 mL syringe  fluticasone 50 mcg/actuation nasal spray  ondansetron 8 mg tablet  oxyCODONE-acetaminophen 5-325 mg tablet         Test Results Pending At Discharge  Pending Labs       No current pending labs.            Hospital Course   Admitted for further management. Seen by Plastics and ID. Treated with broad spectrum IV antibiotics. Went to OR for implant removal with Dr. Dorman. Now with JIL drains and wound vac. Intra op cultures were sent, so far with no growth. Due to the extent of the infection and the history of prior infection, ID decided on IV antibiotics at [facility]. Attempted to trial dose of vancomycin inpatient, she again developed itching. PICC line was placed and orders for cefepime and daptomycin were placed by ID for DC. Pain with better control. HHC and home infusion set up and patient will DC home today. Labs and VS are stable.     Case and plan was discussed with patient, she is agreeable.   Case and plan was also discussed with my collaborating physician.     Time spent 45 minutes.     Pertinent Physical Exam At Time of Discharge  Physical Exam    Patient seen and examined. Resting in bed. States she feels ok, pain controlled with the percocet. No fevers or chills. Passing gas, no BM yet. Discussed importance of twice daily stool softener with her at [facility]. She has no abd pain or nausea complaints.     General: Alert and oriented x3, pleasant.   Cardiac: Regular rate and rhythm, S1/S2 , no murmur.   Pulmonary: Clear to auscultation on room air.   Abdomen:  Soft, round, nontender. BS +x4.   Extremities: No edema.  Skin: No rashes or lesions.  L breast wound vac and JIL drain x2 present. No erythema seen.     Outpatient Follow-Up  Future Appointments   Date Time Provider Department [place]   12/6/2023  3:00 PM GEN WOUND, WOUNDCARE RESOURCE [facility]   12/13/2023  3:00 PM GEN WOUND, WOUNDCARE RESOURCE [facility]   12/20/2023  3:00 PM GEN WOUND, WOUNDCARE RESOURCE [facility]   12/27/2023  3:00 PM GEN WOUND, WOUNDCARE RESOURCE [facility]         Ilana Ferrell, APRN-CNP

## 2023-12-01 NOTE — TELEPHONE ENCOUNTER
The patient has not followed up with me after her surgery in September.  She recently had her implants removed due to an infection.  She has been getting hyperbaric oxygen treatments and has been in and out of the hospital with recurrent cellulitis.  I have left a message that I am referring her to medical oncology so that she can begin hormonal therapy.  She also will still need to follow-up with me.

## 2023-12-03 ENCOUNTER — HOME CARE VISIT (OUTPATIENT)
Dept: HOME HEALTH SERVICES | Facility: HOME HEALTH | Age: 58
End: 2023-12-03
Payer: COMMERCIAL

## 2023-12-03 PROCEDURE — 0023 HH SOC

## 2023-12-03 PROCEDURE — G0299 HHS/HOSPICE OF RN EA 15 MIN: HCPCS

## 2023-12-03 NOTE — CASE COMMUNICATION
Start of care completed at this time. Patient's course of antibiotics is currently scheduled to be concluding on 12/11/2023. She states plan to have another surgery by end of December.    Just less than 2 weeks ago, previous PICC line was removed in the home as ordered and she was readmitted, new PICC placed, infection.  She is wondering if at the end of this course of antibiotics, can she leave PICC line in and maintain daily flushing  protocol to allow sufficient time to determine  healing of infection and have surgery.     She requested I reach out to her physicians and pharmacy.

## 2023-12-04 ENCOUNTER — HOME CARE VISIT (OUTPATIENT)
Dept: HOME HEALTH SERVICES | Facility: HOME HEALTH | Age: 58
End: 2023-12-04
Payer: COMMERCIAL

## 2023-12-04 ENCOUNTER — TELEPHONE (OUTPATIENT)
Dept: INPATIENT UNIT | Facility: HOSPITAL | Age: 58
End: 2023-12-04

## 2023-12-04 VITALS
BODY MASS INDEX: 31.89 KG/M2 | TEMPERATURE: 98.2 F | RESPIRATION RATE: 20 BRPM | DIASTOLIC BLOOD PRESSURE: 68 MMHG | SYSTOLIC BLOOD PRESSURE: 118 MMHG | OXYGEN SATURATION: 98 % | WEIGHT: 180 LBS | HEIGHT: 63 IN | HEART RATE: 72 BPM

## 2023-12-04 ASSESSMENT — ENCOUNTER SYMPTOMS
APPETITE LEVEL: GOOD
PAIN LOCATION - PAIN QUALITY: ACHY
HIGHEST PAIN SEVERITY IN PAST 24 HOURS: 4/10
CHANGE IN APPETITE: UNCHANGED
LOSS OF SENSATION IN FEET: 0
PAIN LOCATION - PAIN FREQUENCY: INTERMITTENT
PAIN LOCATION: LEFT BREAST
DEPRESSION: 0
LOWEST PAIN SEVERITY IN PAST 24 HOURS: 2/10
PAIN SEVERITY GOAL: 0/10
PAIN LOCATION - PAIN SEVERITY: 2/10
OCCASIONAL FEELINGS OF UNSTEADINESS: 0
PAIN: 1
PAIN LOCATION - RELIEVING FACTORS: REST, MEDICATION
PERSON REPORTING PAIN: PATIENT

## 2023-12-04 ASSESSMENT — ACTIVITIES OF DAILY LIVING (ADL)
OASIS_M1830: 01
ENTERING_EXITING_HOME: STAND BY ASSIST
AMBULATION ASSISTANCE: STAND BY ASSIST
AMBULATION ASSISTANCE: 1

## 2023-12-04 NOTE — HOME HEALTH
Patient is identified by name and .    She has newly placed PICC line. Reached out to her physicians, as she is scheduled to receive IV antibiotics for two weeks, but later this month, she is going back in to have surgery/end of December.    She is wondering if the PICC line can remain in after antibiotics have concluded.    Benson back from her ID doctor and patient will have PICC  upon conclusion of the antibiotic.  I will instruct her on the daily flushing.

## 2023-12-04 NOTE — CASE COMMUNICATION
Patient contacted this nurse this evening after having appointment with Dr. Dorman.  I already informed her that Dr. Harris ordered for the PICC line to be maintained.  Patient was requesting that it stay in.     She now states that Dr. Dorman wants it to be removed upon completion of antibiotics.   She is asking nurse to come over this coming Friday, which is the 8th, to remove PICC.    However, the antibiotics are scheduled to go thro ugh the 11th.  She states Dr. Dorman wants PICC line removed Friday.    I reached out to pharmacy and verified that the medication is scheduled to be completed on 11th.    The only order regarding PICC line removal is the new one from today to maintain PICC line after the course of antibiotics. I will not be removing PICC line until a new order comes through ordering us to do so.      Pharmacy is aware. Patient is aware.    I will be see ing her tomorrow morning for PICC line dressing change and weekly bloodwork. Patient states she sees Dr. Harris on Wednesday.

## 2023-12-05 ENCOUNTER — LAB (OUTPATIENT)
Dept: LAB | Facility: LAB | Age: 58
End: 2023-12-05
Payer: COMMERCIAL

## 2023-12-05 ENCOUNTER — HOME CARE VISIT (OUTPATIENT)
Dept: HOME HEALTH SERVICES | Facility: HOME HEALTH | Age: 58
End: 2023-12-05
Payer: COMMERCIAL

## 2023-12-05 DIAGNOSIS — N61.0 CELLULITIS OF LEFT BREAST: ICD-10-CM

## 2023-12-05 LAB
ALBUMIN SERPL BCP-MCNC: 4 G/DL (ref 3.4–5)
ALP SERPL-CCNC: 144 U/L (ref 33–110)
ALT SERPL W P-5'-P-CCNC: 28 U/L (ref 7–45)
ANION GAP SERPL CALC-SCNC: 11 MMOL/L (ref 10–20)
AST SERPL W P-5'-P-CCNC: 31 U/L (ref 9–39)
BASOPHILS # BLD AUTO: 0.03 X10*3/UL (ref 0–0.1)
BASOPHILS NFR BLD AUTO: 0.8 %
BILIRUB SERPL-MCNC: 0.4 MG/DL (ref 0–1.2)
BUN SERPL-MCNC: 10 MG/DL (ref 6–23)
CALCIUM SERPL-MCNC: 8.7 MG/DL (ref 8.6–10.3)
CHLORIDE SERPL-SCNC: 106 MMOL/L (ref 98–107)
CK SERPL-CCNC: 20 U/L (ref 0–215)
CO2 SERPL-SCNC: 26 MMOL/L (ref 21–32)
CREAT SERPL-MCNC: 0.51 MG/DL (ref 0.5–1.05)
EOSINOPHIL # BLD AUTO: 0.12 X10*3/UL (ref 0–0.7)
EOSINOPHIL NFR BLD AUTO: 3 %
ERYTHROCYTE [DISTWIDTH] IN BLOOD BY AUTOMATED COUNT: 13.9 % (ref 11.5–14.5)
GFR SERPL CREATININE-BSD FRML MDRD: >90 ML/MIN/1.73M*2
GLUCOSE SERPL-MCNC: 221 MG/DL (ref 74–99)
HCT VFR BLD AUTO: 31.8 % (ref 36–46)
HGB BLD-MCNC: 10.1 G/DL (ref 12–16)
IMM GRANULOCYTES # BLD AUTO: 0.02 X10*3/UL (ref 0–0.7)
IMM GRANULOCYTES NFR BLD AUTO: 0.5 % (ref 0–0.9)
LYMPHOCYTES # BLD AUTO: 1.21 X10*3/UL (ref 1.2–4.8)
LYMPHOCYTES NFR BLD AUTO: 30.6 %
MCH RBC QN AUTO: 31.4 PG (ref 26–34)
MCHC RBC AUTO-ENTMCNC: 31.8 G/DL (ref 32–36)
MCV RBC AUTO: 99 FL (ref 80–100)
MONOCYTES # BLD AUTO: 0.31 X10*3/UL (ref 0.1–1)
MONOCYTES NFR BLD AUTO: 7.8 %
NEUTROPHILS # BLD AUTO: 2.26 X10*3/UL (ref 1.2–7.7)
NEUTROPHILS NFR BLD AUTO: 57.3 %
NRBC BLD-RTO: 0 /100 WBCS (ref 0–0)
PLATELET # BLD AUTO: 90 X10*3/UL (ref 150–450)
POTASSIUM SERPL-SCNC: 3.4 MMOL/L (ref 3.5–5.3)
PROT SERPL-MCNC: 7.1 G/DL (ref 6.4–8.2)
RBC # BLD AUTO: 3.22 X10*6/UL (ref 4–5.2)
SODIUM SERPL-SCNC: 140 MMOL/L (ref 136–145)
WBC # BLD AUTO: 4 X10*3/UL (ref 4.4–11.3)

## 2023-12-05 PROCEDURE — G0299 HHS/HOSPICE OF RN EA 15 MIN: HCPCS

## 2023-12-05 PROCEDURE — 82550 ASSAY OF CK (CPK): CPT

## 2023-12-05 PROCEDURE — 80053 COMPREHEN METABOLIC PANEL: CPT

## 2023-12-05 PROCEDURE — 85025 COMPLETE CBC W/AUTO DIFF WBC: CPT

## 2023-12-05 ASSESSMENT — ENCOUNTER SYMPTOMS
PAIN SEVERITY GOAL: 0/10
CHANGE IN APPETITE: UNCHANGED
SUBJECTIVE PAIN PROGRESSION: GRADUALLY IMPROVING
PERSON REPORTING PAIN: PATIENT
APPETITE LEVEL: GOOD
HIGHEST PAIN SEVERITY IN PAST 24 HOURS: 0/10
LOWEST PAIN SEVERITY IN PAST 24 HOURS: 0/10
DENIES PAIN: 1

## 2023-12-05 NOTE — HOME HEALTH
Patient is identified by name and .    States no pain /discomfort.    VS stable.    Left breast non-removable dressing intact. Pravena wound vac was removed at visit the day prior with surgeon.    Right arm PICC line dressing changed.  32 cm arm circumference.   Out 0 cm.  Dressing and extention tubing and cap, all changed with sterile technique,  Tolerated well.  Blood work obtained as ordered.  10ml normal saline prior to blood draw, followed by 20 ml normal saline after blood draw and a 5ml 10 units heparin flush.    Blood work will be taken to the [facility] after visit.    Patient to follow up with the Wound care clinic next wednesday, and will start with hyperbaric treatments next Monday, for right foot wound.    We are not treating or ordering supplies at this time, per patient's request.    Patient sees ID tomorrow.

## 2023-12-06 ENCOUNTER — CLINICAL SUPPORT (OUTPATIENT)
Dept: WOUND CARE | Facility: HOSPITAL | Age: 58
End: 2023-12-06
Payer: COMMERCIAL

## 2023-12-06 ENCOUNTER — PATIENT OUTREACH (OUTPATIENT)
Dept: CARE COORDINATION | Facility: CLINIC | Age: 58
End: 2023-12-06
Payer: COMMERCIAL

## 2023-12-06 PROCEDURE — 11042 DBRDMT SUBQ TIS 1ST 20SQCM/<: CPT

## 2023-12-06 NOTE — PROGRESS NOTES
Several outreaches to patient for ALLAN cellulitis of left breast. No answer today when called patients home number.     Umm Vidal , RN   Nurse Care Manager   [facility]   [phone]

## 2023-12-11 ENCOUNTER — LAB (OUTPATIENT)
Dept: LAB | Facility: LAB | Age: 58
End: 2023-12-11
Payer: COMMERCIAL

## 2023-12-11 ENCOUNTER — HOME CARE VISIT (OUTPATIENT)
Dept: HOME HEALTH SERVICES | Facility: HOME HEALTH | Age: 58
End: 2023-12-11
Payer: COMMERCIAL

## 2023-12-11 ENCOUNTER — TELEPHONE (OUTPATIENT)
Dept: PRIMARY CARE | Facility: CLINIC | Age: 58
End: 2023-12-11

## 2023-12-11 DIAGNOSIS — E78.5 DYSLIPIDEMIA: ICD-10-CM

## 2023-12-11 DIAGNOSIS — N61.0 CELLULITIS OF LEFT BREAST: ICD-10-CM

## 2023-12-11 LAB
ALBUMIN SERPL BCP-MCNC: 4.5 G/DL (ref 3.4–5)
ALP SERPL-CCNC: 152 U/L (ref 33–110)
ALT SERPL W P-5'-P-CCNC: 25 U/L (ref 7–45)
ANION GAP SERPL CALC-SCNC: 15 MMOL/L (ref 10–20)
AST SERPL W P-5'-P-CCNC: 32 U/L (ref 9–39)
BASOPHILS # BLD AUTO: 0.05 X10*3/UL (ref 0–0.1)
BASOPHILS NFR BLD AUTO: 0.9 %
BILIRUB SERPL-MCNC: 0.7 MG/DL (ref 0–1.2)
BUN SERPL-MCNC: 12 MG/DL (ref 6–23)
CALCIUM SERPL-MCNC: 9.8 MG/DL (ref 8.6–10.3)
CHLORIDE SERPL-SCNC: 103 MMOL/L (ref 98–107)
CK SERPL-CCNC: 34 U/L (ref 0–215)
CO2 SERPL-SCNC: 25 MMOL/L (ref 21–32)
CREAT SERPL-MCNC: 0.58 MG/DL (ref 0.5–1.05)
EOSINOPHIL # BLD AUTO: 0.18 X10*3/UL (ref 0–0.7)
EOSINOPHIL NFR BLD AUTO: 3.3 %
ERYTHROCYTE [DISTWIDTH] IN BLOOD BY AUTOMATED COUNT: 14.5 % (ref 11.5–14.5)
GFR SERPL CREATININE-BSD FRML MDRD: >90 ML/MIN/1.73M*2
GLUCOSE SERPL-MCNC: 143 MG/DL (ref 74–99)
HCT VFR BLD AUTO: 38 % (ref 36–46)
HGB BLD-MCNC: 11.9 G/DL (ref 12–16)
IMM GRANULOCYTES # BLD AUTO: 0 X10*3/UL (ref 0–0.7)
IMM GRANULOCYTES NFR BLD AUTO: 0 % (ref 0–0.9)
LYMPHOCYTES # BLD AUTO: 1.64 X10*3/UL (ref 1.2–4.8)
LYMPHOCYTES NFR BLD AUTO: 29.7 %
MCH RBC QN AUTO: 32.2 PG (ref 26–34)
MCHC RBC AUTO-ENTMCNC: 31.3 G/DL (ref 32–36)
MCV RBC AUTO: 103 FL (ref 80–100)
MONOCYTES # BLD AUTO: 0.45 X10*3/UL (ref 0.1–1)
MONOCYTES NFR BLD AUTO: 8.2 %
NEUTROPHILS # BLD AUTO: 3.2 X10*3/UL (ref 1.2–7.7)
NEUTROPHILS NFR BLD AUTO: 57.9 %
NRBC BLD-RTO: 0 /100 WBCS (ref 0–0)
PLATELET # BLD AUTO: 115 X10*3/UL (ref 150–450)
POTASSIUM SERPL-SCNC: 3.7 MMOL/L (ref 3.5–5.3)
PROT SERPL-MCNC: 8.1 G/DL (ref 6.4–8.2)
RBC # BLD AUTO: 3.69 X10*6/UL (ref 4–5.2)
SODIUM SERPL-SCNC: 139 MMOL/L (ref 136–145)
WBC # BLD AUTO: 5.5 X10*3/UL (ref 4.4–11.3)

## 2023-12-11 RX ORDER — ATORVASTATIN CALCIUM 20 MG/1
20 TABLET, FILM COATED ORAL DAILY
OUTPATIENT
Start: 2023-12-11

## 2023-12-12 ENCOUNTER — HOME CARE VISIT (OUTPATIENT)
Dept: HOME HEALTH SERVICES | Facility: HOME HEALTH | Age: 58
End: 2023-12-12
Payer: COMMERCIAL

## 2023-12-12 ENCOUNTER — HOME INFUSION (OUTPATIENT)
Dept: INFUSION THERAPY | Age: 58
End: 2023-12-12
Payer: COMMERCIAL

## 2023-12-12 NOTE — CASE COMMUNICATION
"Patient scheduled for weekly visit Tuesday.  Patient contacted this nurse around 11 am requesting that I come today to draw her blood, because Dr. Harris said it needed to be done on Monday this week.     A review of her chart does not show any specific new order to draw blood on Monday. She is scheduled Tuesday, as previously set up. Patient states she is only available \"now\" for bloodwork as she has to leave for Dr. Dorman surgeon fo llow up appointment here shortly.    I am unable to come at the exact time patient is requesting, reminding her how homecare schedules.    Seeing as she is going to Dr. Dorman's office, and the bloodwork all of a sudden is due today at the time she is calling me, I suggest her stopping at the outpatient lab to have the bloodwork drawn via venipuncture so that it will be done today since she will already be out.    She agrees.    Later in  the day, she informed nurse that the bloodwork was done at [facility].    She reports having appointment with Dr. Harris the following day in the afternoon.    She requests that I move her skilled nursing appointment to the following day, to see what happens at Dr. Harris's office.    Currently, there is an order to maintain PICC line, after patient requested it to stay in. But patient states that she wants it removed now.      Will assess after appointment with Dr. Harris.      Should the PICC be removed, patient will be discharged from homecare remotely as there will be no further needs in the home."

## 2023-12-12 NOTE — PROGRESS NOTES
Received orders from Dr Harris via Jessi  Stop antibiotic as ordered after today's dose. PICC removed in office.    Gold copy to intake    Pt care rep to discharge pt from [facility] and discharge chart

## 2023-12-13 ENCOUNTER — CLINICAL SUPPORT (OUTPATIENT)
Dept: WOUND CARE | Facility: HOSPITAL | Age: 58
End: 2023-12-13
Payer: COMMERCIAL

## 2023-12-13 PROCEDURE — 11042 DBRDMT SUBQ TIS 1ST 20SQCM/<: CPT

## 2023-12-13 ASSESSMENT — ENCOUNTER SYMPTOMS
DENIES PAIN: 1
PERSON REPORTING PAIN: PATIENT

## 2023-12-13 ASSESSMENT — ACTIVITIES OF DAILY LIVING (ADL)
HOME_HEALTH_OASIS: 00
OASIS_M1830: 00
AMBULATION ASSISTANCE: INDEPENDENT
AMBULATION ASSISTANCE: 1

## 2023-12-13 NOTE — HOME HEALTH
Patient had bloodwork weekly done at [facility] the day prior while she was out seeing Dr. Dorman, surgeon, for another follow up.    She had appointment with Dr. Harris, ID, today. He removed PICC line.    There are no further needs in the home. All goals are met.    Patient requesting discharge from phone/remotely/no visit.    Planning for additional surgery for reconstruction later in December or early January.    Patient is discharged from homecare, no further disciplines remaining in.

## 2023-12-19 ENCOUNTER — OFFICE VISIT (OUTPATIENT)
Dept: HEMATOLOGY/ONCOLOGY | Facility: CLINIC | Age: 58
End: 2023-12-19
Payer: COMMERCIAL

## 2023-12-19 VITALS
WEIGHT: 180 LBS | HEART RATE: 78 BPM | DIASTOLIC BLOOD PRESSURE: 83 MMHG | HEIGHT: 63 IN | BODY MASS INDEX: 31.89 KG/M2 | SYSTOLIC BLOOD PRESSURE: 137 MMHG | TEMPERATURE: 98.3 F

## 2023-12-19 DIAGNOSIS — Z85.3 HISTORY OF BREAST CANCER: ICD-10-CM

## 2023-12-19 PROCEDURE — 1036F TOBACCO NON-USER: CPT | Performed by: STUDENT IN AN ORGANIZED HEALTH CARE EDUCATION/TRAINING PROGRAM

## 2023-12-19 PROCEDURE — 3044F HG A1C LEVEL LT 7.0%: CPT | Performed by: STUDENT IN AN ORGANIZED HEALTH CARE EDUCATION/TRAINING PROGRAM

## 2023-12-19 PROCEDURE — 99215 OFFICE O/P EST HI 40 MIN: CPT | Performed by: STUDENT IN AN ORGANIZED HEALTH CARE EDUCATION/TRAINING PROGRAM

## 2023-12-19 PROCEDURE — 3008F BODY MASS INDEX DOCD: CPT | Performed by: STUDENT IN AN ORGANIZED HEALTH CARE EDUCATION/TRAINING PROGRAM

## 2023-12-19 PROCEDURE — 3075F SYST BP GE 130 - 139MM HG: CPT | Performed by: STUDENT IN AN ORGANIZED HEALTH CARE EDUCATION/TRAINING PROGRAM

## 2023-12-19 PROCEDURE — 3079F DIAST BP 80-89 MM HG: CPT | Performed by: STUDENT IN AN ORGANIZED HEALTH CARE EDUCATION/TRAINING PROGRAM

## 2023-12-19 PROCEDURE — 99205 OFFICE O/P NEW HI 60 MIN: CPT | Performed by: STUDENT IN AN ORGANIZED HEALTH CARE EDUCATION/TRAINING PROGRAM

## 2023-12-19 RX ORDER — ANASTROZOLE 1 MG/1
1 TABLET ORAL DAILY
Qty: 90 TABLET | Refills: 0 | Status: SHIPPED | OUTPATIENT
Start: 2023-12-19

## 2023-12-19 ASSESSMENT — ENCOUNTER SYMPTOMS
WOUND: 1
CONSTITUTIONAL NEGATIVE: 1
NEUROLOGICAL NEGATIVE: 1
HEMATOLOGIC/LYMPHATIC NEGATIVE: 1
CARDIOVASCULAR NEGATIVE: 1
EYES NEGATIVE: 1
RESPIRATORY NEGATIVE: 1
GASTROINTESTINAL NEGATIVE: 1
ENDOCRINE NEGATIVE: 1

## 2023-12-19 ASSESSMENT — PAIN SCALES - GENERAL: PAINLEVEL: 0-NO PAIN

## 2023-12-19 NOTE — PROGRESS NOTES
Breast Medical Oncology Clinic  Location: [facility]    Visit Type: New Patient Visit    Oncologic History:    4/17/23 - screening mammogram with a left breast mass    6/5/23 - diagnostic mammogram and ultrasound showing a 1x0.65x1cm mass. No suspicious lymph nodes identified.     6/22/23 - core biopsy of a 1x0.6x1cm left breast mass showing IDC, G2, ER>95%, MD>95%, HER2 negative. Clinical stage IA    9/13/23 - underwent left breast mastectomy and SLNBx. Pathology showing a 1.1x1x0.9cm IDC, grade 3 with 0.4cm DCIS, 0/2 lymph nodes involved - [id number], ER>95%, MD>95%, HER2 1+    Post-op complications included infection requiring implant removal and hyperbaric oxygen.     Delay in presenting to medical oncology, not seen until 3 months after surgery.       Subjective: History of Present Illness    Patient presents with the above history. Her post-operative course has been complicated by numerous left breast wound infections requiring antibiotic therapy and implant removals. She notes that the mass was discovered on screening mammogram and has been otherwise in her usual state of health. She reports she has completed abx and has no evidence of active infection. She has another plastic breast surgery planned end of the month.     Denies weight loss, new aches or pains, changes in appetite or energy level.   Gynecologic History:     Patient has a history of hysterectomy and bilateral oophorectomy     Pertinent Family history:    Breast Cancer:  Maternal aunts/cousins   Ovarian Cancer:  Sister  Pancreatic Cancer: None  Other:  Father - prostate cancer; Grandfather colon cancer    Social History  Sabrina Michaud  reports that she has never smoked. She has never used smokeless tobacco.  She  reports that she does not currently use alcohol after a past usage of about 2.0 - 3.0 standard drinks of alcohol per week.  She  reports no history of drug use.    ROS:     Review of Systems   Constitutional: Negative.    HENT:  Negative.    "  Eyes: Negative.    Respiratory: Negative.     Cardiovascular: Negative.    Gastrointestinal: Negative.    Endocrine: Negative.    Genitourinary: Negative.     Skin:  Positive for wound.   Neurological: Negative.    Hematological: Negative.         Physical Examination:    /83 (BP Location: Left arm, Patient Position: Sitting, BP Cuff Size: Adult)   Pulse 78   Temp 36.8 °C (98.3 °F)   Ht 1.605 m (5' 3.19\")   Wt 81.6 kg (180 lb)   BMI 31.70 kg/m²     Physical Exam  Constitutional:       Appearance: Normal appearance.   HENT:      Head: Normocephalic.      Nose: Nose normal.      Mouth/Throat:      Mouth: Mucous membranes are moist.   Eyes:      Conjunctiva/sclera: Conjunctivae normal.      Pupils: Pupils are equal, round, and reactive to light.   Pulmonary:      Effort: Pulmonary effort is normal.   Abdominal:      General: Abdomen is flat.   Musculoskeletal:      Cervical back: Neck supple.   Skin:     General: Skin is warm and dry.   Neurological:      General: No focal deficit present.      Mental Status: She is alert.   Psychiatric:         Mood and Affect: Mood normal.         Behavior: Behavior normal.         Breast Examination:    Left breast:  s/p mastectomy   Right breast:  s/p mastectomy, implant present   Axillary: No significant examination findings    ECOG Performance Status:     [] 0 Fully active, able to carry on all pre-disease performance without restriction  [x] 1 Restricted in physically strenuous activity but ambulatory and able to carry out work of a light or sedentary nature, e.g., light house work, office work  [] 2 Ambulatory and capable of all selfcare but unable to carry out any work activities; up and about more than 50% of waking hours  [] 3 Capable of only limited selfcare; confined to bed or chair more than 50% of waking hours  [] 4 Completely disabled; cannot carry on any selfcare; totally confined to bed or chair  [] 5 Dead "     Results:    Labs:  Reviewed    Imaging:  Reviewed above in Onc History    Pathology:  Reviewed    Assessment:     Pathologic prognostic stage 1A (pT1c pN0 (sn) cM0) infiltrating ductal carcinoma of the left breast; Dx in 2023; Grade 3; ER positive (>95%), MO positive (>95%), HER2 negative; Oncotype DX pending ; S/p bilateral mastectomy and SLNBx.     Sabrina Michaud is a very pleasant 58 y.o. postmenopausal female with newly diagnosed breast cancer with history of HTN, HLD, T2DM. We reviewed the events that led to her diagnosis and subsequent procedures that have taken place. We discussed the features of her cancer that determine the approach to treatment including the size, grade, presence/absence of lymphovascular invasion, lymph node status and hormone receptor status (including Her2-david). Given these findings, we had the following discussion:  Plan:    Surgical Plan: S/p bilateral mastectomy with SLNBx, reconstruction  Additional biopsy: No further biopsy indicated  Radiation Plan: Not indicated  Additional staging scans/DEXA/echo: Staging scans not indicated based on current stage, patient history and physical examination. Will order DEXA at next visit.   Additional Path info (i.e Ki67, PDL1): Not indicated  Gene assays: Oncotype DX ordered    Systemic treatment plan: We recommended given patient's high clinical risk (Grade 3, tumor size >1cm) to obtain an Oncotype Dx score for risk stratification for adjuvant chemotherapy. Patient was agreeable to having this performed. We reviewed that patient would benefit from adjuvant endocrine therapy for at least 5 years to reduce the risk of breast cancer recurrence. Given delay in med/onc visit after surgery, we recommended to proceed with endocrine therapy while we await results of oncotype DX. I reviewed the mechanism of action and potential side effects of aromatase inhibitor therapy, including but not limited to arthralgias/myalgias, osteoporosis with potential  fracture risk, hot flashes/vasomotor symptoms, and cardiovascular events. Anastrozole was sent to her pharmacy.      Intent: Curative   Clinical trial: Not eligible for our current trials   Endocrine therapy: Anastrozole   HER2 treatment: not indicated   Targeted agents: not indicated   Chemotherapy: As discussed above; pending Oncotype DX   BMA: Will discuss at future visit.    Access: Not indicated  Supportive meds: No new supportive medications prescribed  Genetic testing:  patient meets indication for genetic testing, will review at future visit  Fertility preservation: Not indicated  Other active problems/orders:     Non-healing left breast wound / recurrent infection - no evidence of infection on exam today. Patient will follow up with Dr. Dorman.     Surveillance plan: continue follow up with Dr. Awad    Follow-up: 3 weeks to review oncotype DX and finalize adjuvant treatment plan.    Patient expressed understanding of the plan outlined above. She had ample time to ask questions. She understands she can contact us should she have additional questions or issues arise in the interim.    Patient seen and examined with Dr. Noland.    Curtis Hurst MD  Hematology Oncology PGYV    TEACHING ATTENDING ATTESTATION    I saw this patient with the fellow Dr. Hurst. I discussed diagnosis and treatment plan with the fellow and the diagnosis and plan as documented has been edited to reflect accurately my opinion.    Geno Noland MD

## 2023-12-20 ENCOUNTER — CLINICAL SUPPORT (OUTPATIENT)
Dept: WOUND CARE | Facility: HOSPITAL | Age: 58
End: 2023-12-20
Payer: COMMERCIAL

## 2023-12-20 PROCEDURE — 11042 DBRDMT SUBQ TIS 1ST 20SQCM/<: CPT

## 2023-12-21 ENCOUNTER — TELEPHONE (OUTPATIENT)
Dept: ADMISSION | Facility: HOSPITAL | Age: 58
End: 2023-12-21

## 2023-12-21 NOTE — TELEPHONE ENCOUNTER
Wants back on ozempec. States that she is allowed back on post surgery for breast cancer. [facility].

## 2023-12-21 NOTE — TELEPHONE ENCOUNTER
Melyssa from Dr. Dorman's office called. She said Sabrina told her that Dr. Noland wanted to speak to Dr. Dorman so she wanted to provide us with his number. Message sent to the team.

## 2023-12-27 ENCOUNTER — CLINICAL SUPPORT (OUTPATIENT)
Dept: WOUND CARE | Facility: HOSPITAL | Age: 58
End: 2023-12-27
Payer: COMMERCIAL

## 2023-12-27 PROCEDURE — 11042 DBRDMT SUBQ TIS 1ST 20SQCM/<: CPT

## 2024-01-03 ENCOUNTER — CLINICAL SUPPORT (OUTPATIENT)
Dept: WOUND CARE | Facility: HOSPITAL | Age: 59
End: 2024-01-03
Payer: COMMERCIAL

## 2024-01-03 PROCEDURE — 11042 DBRDMT SUBQ TIS 1ST 20SQCM/<: CPT

## 2024-01-08 ENCOUNTER — TELEPHONE (OUTPATIENT)
Dept: HEMATOLOGY/ONCOLOGY | Facility: CLINIC | Age: 59
End: 2024-01-08
Payer: COMMERCIAL

## 2024-01-08 NOTE — TELEPHONE ENCOUNTER
Called and message left that Oncotype will not be back for the visit on 1/9, appointment rescheduled for 01/23/2024.

## 2024-01-09 ENCOUNTER — APPOINTMENT (OUTPATIENT)
Dept: HEMATOLOGY/ONCOLOGY | Facility: CLINIC | Age: 59
End: 2024-01-09
Payer: COMMERCIAL

## 2024-01-10 ENCOUNTER — CLINICAL SUPPORT (OUTPATIENT)
Dept: WOUND CARE | Facility: HOSPITAL | Age: 59
End: 2024-01-10
Payer: COMMERCIAL

## 2024-01-10 PROCEDURE — 11042 DBRDMT SUBQ TIS 1ST 20SQCM/<: CPT

## 2024-01-15 ENCOUNTER — APPOINTMENT (OUTPATIENT)
Dept: PRIMARY CARE | Facility: CLINIC | Age: 59
End: 2024-01-15
Payer: COMMERCIAL

## 2024-01-17 ENCOUNTER — TELEPHONE (OUTPATIENT)
Dept: PRIMARY CARE | Facility: CLINIC | Age: 59
End: 2024-01-17

## 2024-01-17 ENCOUNTER — OFFICE VISIT (OUTPATIENT)
Dept: PRIMARY CARE | Facility: CLINIC | Age: 59
End: 2024-01-17
Payer: COMMERCIAL

## 2024-01-17 ENCOUNTER — CLINICAL SUPPORT (OUTPATIENT)
Dept: WOUND CARE | Facility: HOSPITAL | Age: 59
End: 2024-01-17
Payer: COMMERCIAL

## 2024-01-17 VITALS
HEART RATE: 83 BPM | SYSTOLIC BLOOD PRESSURE: 130 MMHG | TEMPERATURE: 98.1 F | OXYGEN SATURATION: 98 % | BODY MASS INDEX: 33.81 KG/M2 | WEIGHT: 192 LBS | DIASTOLIC BLOOD PRESSURE: 80 MMHG

## 2024-01-17 DIAGNOSIS — E66.9 DIABETES MELLITUS TYPE 2 IN OBESE: ICD-10-CM

## 2024-01-17 DIAGNOSIS — E78.5 DYSLIPIDEMIA: ICD-10-CM

## 2024-01-17 DIAGNOSIS — C50.312 MALIGNANT NEOPLASM OF LOWER-INNER QUADRANT OF LEFT FEMALE BREAST, UNSPECIFIED ESTROGEN RECEPTOR STATUS (MULTI): ICD-10-CM

## 2024-01-17 DIAGNOSIS — I10 BENIGN ESSENTIAL HYPERTENSION: ICD-10-CM

## 2024-01-17 DIAGNOSIS — M86.179 OTHER ACUTE OSTEOMYELITIS, UNSPECIFIED ANKLE AND FOOT (MULTI): ICD-10-CM

## 2024-01-17 DIAGNOSIS — E87.6 HYPOKALEMIA: ICD-10-CM

## 2024-01-17 DIAGNOSIS — D50.9 IRON DEFICIENCY ANEMIA, UNSPECIFIED IRON DEFICIENCY ANEMIA TYPE: ICD-10-CM

## 2024-01-17 DIAGNOSIS — E11.69 DIABETES MELLITUS TYPE 2 IN OBESE: ICD-10-CM

## 2024-01-17 DIAGNOSIS — D68.9 COAGULATION DEFECT, UNSPECIFIED (MULTI): ICD-10-CM

## 2024-01-17 DIAGNOSIS — F32.A DEPRESSION, UNSPECIFIED DEPRESSION TYPE: ICD-10-CM

## 2024-01-17 DIAGNOSIS — E11.69 DIABETES MELLITUS TYPE 2 IN OBESE: Primary | ICD-10-CM

## 2024-01-17 DIAGNOSIS — E66.9 DIABETES MELLITUS TYPE 2 IN OBESE: Primary | ICD-10-CM

## 2024-01-17 PROBLEM — K74.60 HEPATIC CIRRHOSIS (MULTI): Status: RESOLVED | Noted: 2023-02-21 | Resolved: 2024-01-17

## 2024-01-17 LAB
POC FINGERSTICK BLOOD GLUCOSE: 112 MG/DL (ref 70–100)
POC HEMOGLOBIN A1C: 5.5 % (ref 4.2–6.5)

## 2024-01-17 PROCEDURE — 83036 HEMOGLOBIN GLYCOSYLATED A1C: CPT | Performed by: INTERNAL MEDICINE

## 2024-01-17 PROCEDURE — 11042 DBRDMT SUBQ TIS 1ST 20SQCM/<: CPT

## 2024-01-17 PROCEDURE — 1036F TOBACCO NON-USER: CPT | Performed by: INTERNAL MEDICINE

## 2024-01-17 PROCEDURE — 99214 OFFICE O/P EST MOD 30 MIN: CPT | Performed by: INTERNAL MEDICINE

## 2024-01-17 PROCEDURE — 3075F SYST BP GE 130 - 139MM HG: CPT | Performed by: INTERNAL MEDICINE

## 2024-01-17 PROCEDURE — 3008F BODY MASS INDEX DOCD: CPT | Performed by: INTERNAL MEDICINE

## 2024-01-17 PROCEDURE — 82962 GLUCOSE BLOOD TEST: CPT | Performed by: INTERNAL MEDICINE

## 2024-01-17 PROCEDURE — 3079F DIAST BP 80-89 MM HG: CPT | Performed by: INTERNAL MEDICINE

## 2024-01-17 RX ORDER — METOPROLOL SUCCINATE 50 MG/1
50 TABLET, EXTENDED RELEASE ORAL DAILY
Qty: 90 TABLET | Refills: 1 | Status: SHIPPED | OUTPATIENT
Start: 2024-01-17

## 2024-01-17 RX ORDER — FERROUS GLUCONATE 324(38)MG
TABLET ORAL
Qty: 180 TABLET | Refills: 1 | Status: SHIPPED | OUTPATIENT
Start: 2024-01-17

## 2024-01-17 RX ORDER — POTASSIUM CHLORIDE 20 MEQ/1
20 TABLET, EXTENDED RELEASE ORAL 2 TIMES DAILY
Qty: 180 TABLET | Refills: 1 | Status: SHIPPED | OUTPATIENT
Start: 2024-01-17 | End: 2024-02-26

## 2024-01-17 RX ORDER — SERTRALINE HYDROCHLORIDE 100 MG/1
100 TABLET, FILM COATED ORAL 2 TIMES DAILY
Qty: 180 TABLET | Refills: 1 | Status: SHIPPED | OUTPATIENT
Start: 2024-01-17

## 2024-01-17 RX ORDER — SEMAGLUTIDE 0.68 MG/ML
6 INJECTION, SOLUTION SUBCUTANEOUS
Qty: 3 ML | Refills: 0 | Status: SHIPPED | OUTPATIENT
Start: 2024-01-17 | End: 2024-04-17 | Stop reason: HOSPADM

## 2024-01-17 RX ORDER — SEMAGLUTIDE 1.34 MG/ML
0.25 INJECTION, SOLUTION SUBCUTANEOUS
COMMUNITY
End: 2024-04-17 | Stop reason: HOSPADM

## 2024-01-17 RX ORDER — ATORVASTATIN CALCIUM 20 MG/1
20 TABLET, FILM COATED ORAL DAILY
Qty: 90 TABLET | Refills: 1 | Status: SHIPPED | OUTPATIENT
Start: 2024-01-17

## 2024-01-17 RX ORDER — SITAGLIPTIN AND METFORMIN HYDROCHLORIDE 500; 50 MG/1; MG/1
1 TABLET, FILM COATED ORAL
Qty: 180 TABLET | Refills: 1 | Status: SHIPPED | OUTPATIENT
Start: 2024-01-17 | End: 2024-03-26

## 2024-01-17 RX ORDER — AMLODIPINE BESYLATE 10 MG/1
10 TABLET ORAL DAILY
Qty: 90 TABLET | Refills: 1 | Status: SHIPPED | OUTPATIENT
Start: 2024-01-17

## 2024-01-17 RX ORDER — LOSARTAN POTASSIUM AND HYDROCHLOROTHIAZIDE 12.5; 5 MG/1; MG/1
1 TABLET ORAL DAILY
Qty: 90 TABLET | Refills: 1 | Status: SHIPPED | OUTPATIENT
Start: 2024-01-17

## 2024-01-17 RX ORDER — GABAPENTIN 400 MG/1
400 CAPSULE ORAL 3 TIMES DAILY
Qty: 270 CAPSULE | Refills: 0 | Status: SHIPPED | OUTPATIENT
Start: 2024-01-17 | End: 2024-03-04

## 2024-01-17 ASSESSMENT — ENCOUNTER SYMPTOMS
FATIGUE: 0
UNEXPECTED WEIGHT CHANGE: 0
BLOOD IN STOOL: 0
ABDOMINAL PAIN: 0
PALPITATIONS: 0
DIZZINESS: 0
WHEEZING: 0
DIFFICULTY URINATING: 0
HEADACHES: 0
DIARRHEA: 0
HYPERTENSION: 1
COUGH: 0
FEVER: 0
BRUISES/BLEEDS EASILY: 0
SORE THROAT: 0
SINUS PAIN: 0
ARTHRALGIAS: 0

## 2024-01-17 NOTE — PROGRESS NOTES
Subjective   Patient ID: Sabrina Michaud is a 58 y.o. female who presents for Diabetes (In between mastectomy/implant surgeries so was told okay to go back on ozempic at this time), Hypertension, and Med Refill.    - Status post left breast mastectomy patient underwent also right breast mastectomy and implant due to underlying strong family history of breast cancer  Postoperatively patient developed left breast implant infection required IV antibiotic infusion without improvement patient underwent removal of the implant awaiting recovery at this time and to start again reimplant in 2 months  - Status post bilateral mastectomy compensated  - Status post right leg cellulitis improving right big toe diabetic ulcer patient under care of podiatry healing well slowly follow-up podiatry  -Diabetes obesity patient takes Janumet twice a day need to lose weight low-carb diet exercise  -Need to restart Ozempic 0.25 mg weekly did not increase 2.5 mg weekly until reevaluation 2 months  -- Peripheral neuropathy continue with gabapentin  -Hypertension controlled  -Anxiety and depression, uncontrolled , increase Zoloft 100 mg, to BID no medication side effects  Weight loss   -Anemia or leukopenia and thrombocytopenia hepatomegaly unremarkable liver exam on MRI secondary to alcohol induced liver cirrhosis  Patient counseled about alcohol cessation needs to follow-up hematology upper endoscopy as an outpatient patient needs to schedule appointment with Dr. Us  -Anemia follow-up hematology oncology, follow-up CBC  Reevaluate patient in 4 weeks      Diabetes  Pertinent negatives for hypoglycemia include no dizziness or headaches. Pertinent negatives for diabetes include no chest pain and no fatigue.   Hypertension  Pertinent negatives include no chest pain, headaches or palpitations.          Review of Systems   Constitutional:  Negative for fatigue, fever and unexpected weight change.   HENT:  Negative for congestion, ear  discharge, ear pain, mouth sores, sinus pain and sore throat.    Eyes:  Negative for visual disturbance.   Respiratory:  Negative for cough and wheezing.    Cardiovascular:  Negative for chest pain, palpitations and leg swelling.   Gastrointestinal:  Negative for abdominal pain, blood in stool and diarrhea.   Genitourinary:  Negative for difficulty urinating.   Musculoskeletal:  Negative for arthralgias.   Skin:  Negative for rash.   Neurological:  Negative for dizziness and headaches.   Hematological:  Does not bruise/bleed easily.   Psychiatric/Behavioral:  Negative for behavioral problems.    All other systems reviewed and are negative.      Objective   Lab Results   Component Value Date    HGBA1C 5.5 01/17/2024      Lab Results   Component Value Date    WBC 5.5 12/11/2023    HGB 11.9 (L) 12/11/2023    HCT 38.0 12/11/2023     (L) 12/11/2023    CHOL 112 06/01/2022    TRIG 222 (H) 06/01/2022    HDL 32.0 (A) 06/01/2022    ALT 25 12/11/2023    AST 32 12/11/2023     12/11/2023    K 3.7 12/11/2023     12/11/2023    CREATININE 0.58 12/11/2023    BUN 12 12/11/2023    CO2 25 12/11/2023    TSH 3.89 05/24/2021    INR 1.3 (H) 08/17/2022    HGBA1C 5.5 01/17/2024     par   /80   Pulse 83   Temp 36.7 °C (98.1 °F)   Wt 87.1 kg (192 lb)   SpO2 98%   BMI 33.81 kg/m²     Physical Exam    Assessment/Plan   Sabrina was seen today for diabetes, hypertension and med refill.  Diagnoses and all orders for this visit:  Diabetes mellitus type 2 in obese (CMS/HCC) (Primary)  -     POCT fingerstick glucose manually resulted  -     POCT glycosylated hemoglobin (Hb A1C) manually resulted  -     semaglutide (Ozempic) 0.25 mg or 0.5 mg (2 mg/3 mL) pen injector; Inject 4 mg under the skin 1 (one) time per week.  -     SITagliptin phos-metformin (Janumet)  mg tablet; Take 1 tablet by mouth 2 times a day with meals.  Coagulation defect, unspecified (CMS/HCC)  Benign essential hypertension  -     amLODIPine  (Norvasc) 10 mg tablet; Take 1 tablet (10 mg) by mouth once daily.  -     losartan-hydrochlorothiazide (Hyzaar) 50-12.5 mg tablet; Take 1 tablet by mouth once daily. No further refills until seen in office  -     metoprolol succinate XL (Toprol-XL) 50 mg 24 hr tablet; Take 1 tablet (50 mg) by mouth once daily.  Dyslipidemia  -     atorvastatin (Lipitor) 20 mg tablet; Take 1 tablet (20 mg) by mouth once daily.  Iron deficiency anemia, unspecified iron deficiency anemia type  -     ferrous gluconate 324 (38 Fe) mg tablet; TAKE 1 TABLET IN THE MORNING AND 1 TABLET BEFORE BEDTIME  Other acute osteomyelitis, unspecified ankle and foot (CMS/HCC)  -     gabapentin (Neurontin) 400 mg capsule; Take 1 capsule (400 mg) by mouth 3 times a day.  Hypokalemia  -     potassium chloride CR 20 mEq ER tablet; Take 1 tablet (20 mEq) by mouth 2 times a day.  Depression, unspecified depression type  -     sertraline (Zoloft) 100 mg tablet; Take 1 tablet (100 mg) by mouth 2 times a day.  Malignant neoplasm of lower-inner quadrant of left female breast, unspecified estrogen receptor status (CMS/HCC)  Other orders  -     Follow Up In Primary Care - Established; Future   - Status post left breast mastectomy patient underwent also right breast mastectomy and implant due to underlying strong family history of breast cancer  Postoperatively patient developed left breast implant infection required IV antibiotic infusion without improvement patient underwent removal of the implant awaiting recovery at this time and to start again reimplant in 2 months  - Status post bilateral mastectomy compensated  - Status post right leg cellulitis improving right big toe diabetic ulcer patient under care of podiatry healing well slowly follow-up podiatry  -Diabetes obesity patient takes Janumet twice a day need to lose weight low-carb diet exercise  -Need to restart Ozempic 0.25 mg weekly did not increase 2.5 mg weekly until reevaluation 2 months  --  Peripheral neuropathy continue with gabapentin  -Hypertension controlled  -Anxiety and depression, uncontrolled , increase Zoloft 100 mg, to BID no medication side effects  Weight loss   -Anemia or leukopenia and thrombocytopenia hepatomegaly unremarkable liver exam on MRI secondary to alcohol induced liver cirrhosis  Patient counseled about alcohol cessation needs to follow-up hematology upper endoscopy as an outpatient patient needs to schedule appointment with Dr. Us  -Anemia follow-up hematology oncology, follow-up CBC  Reevaluate patient in 4 weeks

## 2024-01-18 ENCOUNTER — TELEMEDICINE (OUTPATIENT)
Dept: HEMATOLOGY/ONCOLOGY | Facility: CLINIC | Age: 59
End: 2024-01-18
Payer: COMMERCIAL

## 2024-01-18 ENCOUNTER — TELEPHONE (OUTPATIENT)
Dept: HEMATOLOGY/ONCOLOGY | Facility: CLINIC | Age: 59
End: 2024-01-18
Payer: COMMERCIAL

## 2024-01-18 ENCOUNTER — TELEPHONE (OUTPATIENT)
Dept: HEMATOLOGY/ONCOLOGY | Facility: CLINIC | Age: 59
End: 2024-01-18

## 2024-01-18 DIAGNOSIS — Z85.3 HISTORY OF BREAST CANCER: Primary | ICD-10-CM

## 2024-01-18 PROCEDURE — 99215 OFFICE O/P EST HI 40 MIN: CPT | Performed by: STUDENT IN AN ORGANIZED HEALTH CARE EDUCATION/TRAINING PROGRAM

## 2024-01-18 ASSESSMENT — ENCOUNTER SYMPTOMS
GASTROINTESTINAL NEGATIVE: 1
CARDIOVASCULAR NEGATIVE: 1
HEMATOLOGIC/LYMPHATIC NEGATIVE: 1
CONSTITUTIONAL NEGATIVE: 1
ENDOCRINE NEGATIVE: 1
RESPIRATORY NEGATIVE: 1
NEUROLOGICAL NEGATIVE: 1
EYES NEGATIVE: 1

## 2024-01-18 NOTE — PROGRESS NOTES
Breast Medical Oncology Clinic  Location: [facility]    Visit Type: Follow-up Visit    Oncologic History:    4/17/23 - screening mammogram with a left breast mass    6/5/23 - diagnostic mammogram and ultrasound showing a 1x0.65x1cm mass. No suspicious lymph nodes identified.     6/22/23 - core biopsy of a 1x0.6x1cm left breast mass showing IDC, G2, ER>95%, NC>95%, HER2 negative. Clinical stage IA    9/13/23 - underwent left breast mastectomy and SLNBx. Pathology showing a 1.1x1x0.9cm IDC, grade 3 with 0.4cm DCIS, 0/2 lymph nodes involved - [id number], ER>95%, NC>95%, HER2 1+    Post-op complications included infection requiring implant removal and hyperbaric oxygen.     Delay in presenting to medical oncology, not seen until 3 months after surgery.     Oncotype DX RS: 13. Distant recurrence risk at 9 years with Davis 4%. Absolute chemotherapy benefit <1%      Subjective: Interval History    A telephone visit (audio only) between the patient (home) and the provider (at clinic site) was utilized to provide this telehealth service. Verbal consent was requested and obtained from patient on the date of the service for a telehealth visit.     Has been on anastrozole for the last month. Endorsing some fatigue and hot flashes and feels it is manageable thus far.     Follow-up with her plastic surgeon next month.     Otherwise, doing well.     Gynecologic History:     Patient has a history of hysterectomy and bilateral oophorectomy     Pertinent Family history:    Breast Cancer:  Maternal aunts/cousins   Ovarian Cancer:  Sister  Pancreatic Cancer: None  Other:  Father - prostate cancer; Grandfather colon cancer    Social History  Sabrina Michaud  reports that she has never smoked. She has never used smokeless tobacco.  She  reports that she does not currently use alcohol after a past usage of about 2.0 - 3.0 standard drinks of alcohol per week.  She  reports no history of drug use.    ROS:     Review of Systems   Constitutional:  Negative.    HENT:  Negative.     Eyes: Negative.    Respiratory: Negative.     Cardiovascular: Negative.    Gastrointestinal: Negative.    Endocrine: Negative.    Genitourinary: Negative.     Skin:  Negative for wound.   Neurological: Negative.    Hematological: Negative.    All other systems reviewed and are negative.       Physical Examination:    There were no vitals taken for this visit.    Physical Exam; Physical examination was not performed during this telemedicine/virtual visit.        Breast Examination:    Physical examination was not performed during this telemedicine/virtual visit.      ECOG Performance Status:     [] 0 Fully active, able to carry on all pre-disease performance without restriction  [x] 1 Restricted in physically strenuous activity but ambulatory and able to carry out work of a light or sedentary nature, e.g., light house work, office work  [] 2 Ambulatory and capable of all selfcare but unable to carry out any work activities; up and about more than 50% of waking hours  [] 3 Capable of only limited selfcare; confined to bed or chair more than 50% of waking hours  [] 4 Completely disabled; cannot carry on any selfcare; totally confined to bed or chair  [] 5 Dead     Results:    Labs:  Reviewed    Imaging:  Reviewed above in Onc History    Pathology:  Reviewed    Assessment:     Pathologic prognostic stage 1A (pT1c pN0 (sn) cM0) infiltrating ductal carcinoma of the left breast; Dx in 2023; Grade 3; ER positive (>95%), RI positive (>95%), HER2 negative; Oncotype DX 13 ; S/p bilateral mastectomy and SLNBx.     Tolerating anastrozole with mild/manageable toxicities. Discussed oncotype. Will continue anastrozole with follow-up in 3 months.     Plan:    Surgical Plan: S/p bilateral mastectomy with SLNBx, reconstruction  Additional biopsy: No further biopsy indicated  Radiation Plan: Not indicated  Additional staging scans/DEXA/echo: Staging scans not indicated based on current stage, patient  history and physical examination. Will order baseline DEXA scan in anticipation of long term endocrine therapy.    Additional Path info (i.e Ki67, PDL1): Not indicated  Gene assays: Oncotype DX 13    Systemic treatment plan: Anastrozole   Intent: Curative   Clinical trial: Not eligible for our current trials   Endocrine therapy: Anastrozole   HER2 treatment: not indicated   Targeted agents: not indicated   Chemotherapy: Not indicated, Oncotype DX RS 13   BMA: Will discuss at future visit.    Access: Not indicated  Supportive meds: No new supportive medications prescribed  Genetic testing:  patient meets indication for genetic testing;  she has declined at this time.   Fertility preservation: Not indicated  Other active problems/orders:     Non-healing left breast wound / recurrent infection - no evidence of infection on exam today. Follows with her plastic surgeon    Surveillance plan: continue follow up with Dr. Awad    Follow-up: 3 months    Patient expressed understanding of the plan outlined above. She had ample time to ask questions. She understands she can contact us should she have additional questions or issues arise in the interim.

## 2024-01-19 ASSESSMENT — ENCOUNTER SYMPTOMS: WOUND: 0

## 2024-01-23 ENCOUNTER — APPOINTMENT (OUTPATIENT)
Dept: HEMATOLOGY/ONCOLOGY | Facility: CLINIC | Age: 59
End: 2024-01-23
Payer: COMMERCIAL

## 2024-01-24 ENCOUNTER — CLINICAL SUPPORT (OUTPATIENT)
Dept: WOUND CARE | Facility: HOSPITAL | Age: 59
End: 2024-01-24
Payer: COMMERCIAL

## 2024-01-24 ENCOUNTER — LAB REQUISITION (OUTPATIENT)
Dept: LAB | Facility: HOSPITAL | Age: 59
End: 2024-01-24
Payer: COMMERCIAL

## 2024-01-24 DIAGNOSIS — E11.42 DIABETIC POLYNEUROPATHY ASSOCIATED WITH TYPE 2 DIABETES MELLITUS (MULTI): ICD-10-CM

## 2024-01-24 DIAGNOSIS — L97.518 NON-PRESSURE CHRONIC ULCER OF OTHER PART OF RIGHT FOOT WITH OTHER SPECIFIED SEVERITY (MULTI): ICD-10-CM

## 2024-01-24 DIAGNOSIS — E11.42 TYPE 2 DIABETES MELLITUS WITH DIABETIC POLYNEUROPATHY (MULTI): ICD-10-CM

## 2024-01-24 DIAGNOSIS — L97.518 NON-PRESSURE CHRONIC ULCER OF OTHER PART OF RIGHT FOOT WITH OTHER SPECIFIED SEVERITY (MULTI): Primary | ICD-10-CM

## 2024-01-24 PROCEDURE — 87070 CULTURE OTHR SPECIMN AEROBIC: CPT

## 2024-01-24 PROCEDURE — 11042 DBRDMT SUBQ TIS 1ST 20SQCM/<: CPT

## 2024-01-24 PROCEDURE — 87075 CULTR BACTERIA EXCEPT BLOOD: CPT

## 2024-01-24 PROCEDURE — 87205 SMEAR GRAM STAIN: CPT

## 2024-01-24 PROCEDURE — 87186 SC STD MICRODIL/AGAR DIL: CPT

## 2024-01-27 LAB
BACTERIA SPEC CULT: ABNORMAL
GRAM STN SPEC: ABNORMAL
GRAM STN SPEC: ABNORMAL

## 2024-01-31 ENCOUNTER — CLINICAL SUPPORT (OUTPATIENT)
Dept: WOUND CARE | Facility: HOSPITAL | Age: 59
End: 2024-01-31
Payer: COMMERCIAL

## 2024-01-31 PROCEDURE — 11042 DBRDMT SUBQ TIS 1ST 20SQCM/<: CPT

## 2024-02-07 ENCOUNTER — CLINICAL SUPPORT (OUTPATIENT)
Dept: WOUND CARE | Facility: HOSPITAL | Age: 59
End: 2024-02-07
Payer: COMMERCIAL

## 2024-02-07 PROCEDURE — 11042 DBRDMT SUBQ TIS 1ST 20SQCM/<: CPT

## 2024-02-08 ENCOUNTER — HOSPITAL ENCOUNTER (OUTPATIENT)
Dept: RADIOLOGY | Facility: HOSPITAL | Age: 59
Discharge: HOME | End: 2024-02-08
Payer: COMMERCIAL

## 2024-02-08 DIAGNOSIS — L97.518 NON-PRESSURE CHRONIC ULCER OF OTHER PART OF RIGHT FOOT WITH OTHER SPECIFIED SEVERITY (MULTI): ICD-10-CM

## 2024-02-08 DIAGNOSIS — E11.42 DIABETIC POLYNEUROPATHY ASSOCIATED WITH TYPE 2 DIABETES MELLITUS (MULTI): ICD-10-CM

## 2024-02-08 PROCEDURE — A9575 INJ GADOTERATE MEGLUMI 0.1ML: HCPCS | Performed by: PODIATRIST

## 2024-02-08 PROCEDURE — 73720 MRI LWR EXTREMITY W/O&W/DYE: CPT | Mod: RT

## 2024-02-08 PROCEDURE — 73720 MRI LWR EXTREMITY W/O&W/DYE: CPT | Mod: RIGHT SIDE | Performed by: RADIOLOGY

## 2024-02-08 PROCEDURE — 2550000001 HC RX 255 CONTRASTS: Performed by: PODIATRIST

## 2024-02-08 RX ORDER — GADOTERATE MEGLUMINE 376.9 MG/ML
17 INJECTION INTRAVENOUS
Status: COMPLETED | OUTPATIENT
Start: 2024-02-08 | End: 2024-02-08

## 2024-02-08 RX ADMIN — GADOTERATE MEGLUMINE 17 ML: 376.9 INJECTION INTRAVENOUS at 14:54

## 2024-02-14 ENCOUNTER — CLINICAL SUPPORT (OUTPATIENT)
Dept: WOUND CARE | Facility: HOSPITAL | Age: 59
End: 2024-02-14
Payer: COMMERCIAL

## 2024-02-14 PROCEDURE — 11042 DBRDMT SUBQ TIS 1ST 20SQCM/<: CPT

## 2024-02-15 ENCOUNTER — TELEPHONE (OUTPATIENT)
Dept: INFECTIOUS DISEASES | Facility: HOSPITAL | Age: 59
End: 2024-02-15

## 2024-02-16 RX ORDER — FAMOTIDINE 10 MG/ML
20 INJECTION INTRAVENOUS ONCE AS NEEDED
Status: CANCELLED | OUTPATIENT
Start: 2024-02-19

## 2024-02-16 RX ORDER — EPINEPHRINE 0.3 MG/.3ML
0.3 INJECTION SUBCUTANEOUS EVERY 5 MIN PRN
Status: CANCELLED | OUTPATIENT
Start: 2024-02-19

## 2024-02-16 RX ORDER — ALBUTEROL SULFATE 0.83 MG/ML
3 SOLUTION RESPIRATORY (INHALATION) AS NEEDED
Status: CANCELLED | OUTPATIENT
Start: 2024-02-19

## 2024-02-16 RX ORDER — HEPARIN 100 UNIT/ML
500 SYRINGE INTRAVENOUS AS NEEDED
Status: CANCELLED | OUTPATIENT
Start: 2024-02-19

## 2024-02-16 RX ORDER — DIPHENHYDRAMINE HYDROCHLORIDE 50 MG/ML
50 INJECTION INTRAMUSCULAR; INTRAVENOUS AS NEEDED
Status: CANCELLED | OUTPATIENT
Start: 2024-02-19

## 2024-02-16 RX ORDER — HEPARIN SODIUM,PORCINE/PF 10 UNIT/ML
50 SYRINGE (ML) INTRAVENOUS AS NEEDED
Status: CANCELLED | OUTPATIENT
Start: 2024-02-19

## 2024-02-19 ENCOUNTER — LAB REQUISITION (OUTPATIENT)
Dept: LAB | Facility: HOSPITAL | Age: 59
End: 2024-02-19
Payer: COMMERCIAL

## 2024-02-19 ENCOUNTER — INFUSION (OUTPATIENT)
Dept: HEMATOLOGY/ONCOLOGY | Facility: HOSPITAL | Age: 59
End: 2024-02-19
Payer: COMMERCIAL

## 2024-02-19 VITALS
HEART RATE: 98 BPM | TEMPERATURE: 98.2 F | DIASTOLIC BLOOD PRESSURE: 76 MMHG | RESPIRATION RATE: 17 BRPM | SYSTOLIC BLOOD PRESSURE: 131 MMHG | OXYGEN SATURATION: 95 %

## 2024-02-19 DIAGNOSIS — M86.179: Primary | ICD-10-CM

## 2024-02-19 LAB
ALBUMIN SERPL BCP-MCNC: 4.5 G/DL (ref 3.4–5)
ALP SERPL-CCNC: 131 U/L (ref 33–110)
ALT SERPL W P-5'-P-CCNC: 21 U/L (ref 7–45)
ANION GAP SERPL CALC-SCNC: 23 MMOL/L (ref 10–20)
AST SERPL W P-5'-P-CCNC: 29 U/L (ref 9–39)
BASOPHILS # BLD AUTO: 0.07 X10*3/UL (ref 0–0.1)
BASOPHILS NFR BLD AUTO: 0.6 %
BILIRUB SERPL-MCNC: 1.4 MG/DL (ref 0–1.2)
BUN SERPL-MCNC: 13 MG/DL (ref 6–23)
CALCIUM SERPL-MCNC: 9.8 MG/DL (ref 8.6–10.3)
CHLORIDE SERPL-SCNC: 96 MMOL/L (ref 98–107)
CK SERPL-CCNC: 30 U/L (ref 0–215)
CO2 SERPL-SCNC: 21 MMOL/L (ref 21–32)
CREAT SERPL-MCNC: 0.69 MG/DL (ref 0.5–1.05)
EGFRCR SERPLBLD CKD-EPI 2021: >90 ML/MIN/1.73M*2
EOSINOPHIL # BLD AUTO: 0.19 X10*3/UL (ref 0–0.7)
EOSINOPHIL NFR BLD AUTO: 1.8 %
ERYTHROCYTE [DISTWIDTH] IN BLOOD BY AUTOMATED COUNT: 14.5 % (ref 11.5–14.5)
GLUCOSE SERPL-MCNC: 101 MG/DL (ref 74–99)
HCT VFR BLD AUTO: 41.7 % (ref 36–46)
HGB BLD-MCNC: 14 G/DL (ref 12–16)
HOLD SPECIMEN: NORMAL
IMM GRANULOCYTES # BLD AUTO: 0.04 X10*3/UL (ref 0–0.7)
IMM GRANULOCYTES NFR BLD AUTO: 0.4 % (ref 0–0.9)
LYMPHOCYTES # BLD AUTO: 3.05 X10*3/UL (ref 1.2–4.8)
LYMPHOCYTES NFR BLD AUTO: 28.3 %
MCH RBC QN AUTO: 31.7 PG (ref 26–34)
MCHC RBC AUTO-ENTMCNC: 33.6 G/DL (ref 32–36)
MCV RBC AUTO: 94 FL (ref 80–100)
MONOCYTES # BLD AUTO: 0.84 X10*3/UL (ref 0.1–1)
MONOCYTES NFR BLD AUTO: 7.8 %
NEUTROPHILS # BLD AUTO: 6.59 X10*3/UL (ref 1.2–7.7)
NEUTROPHILS NFR BLD AUTO: 61.1 %
NRBC BLD-RTO: 0 /100 WBCS (ref 0–0)
PLATELET # BLD AUTO: 134 X10*3/UL (ref 150–450)
POTASSIUM SERPL-SCNC: 3.6 MMOL/L (ref 3.5–5.3)
PROT SERPL-MCNC: 8.1 G/DL (ref 6.4–8.2)
RBC # BLD AUTO: 4.42 X10*6/UL (ref 4–5.2)
SODIUM SERPL-SCNC: 136 MMOL/L (ref 136–145)
WBC # BLD AUTO: 10.8 X10*3/UL (ref 4.4–11.3)

## 2024-02-19 PROCEDURE — 85025 COMPLETE CBC W/AUTO DIFF WBC: CPT

## 2024-02-19 PROCEDURE — 82550 ASSAY OF CK (CPK): CPT

## 2024-02-19 PROCEDURE — 96368 THER/DIAG CONCURRENT INF: CPT | Mod: INF

## 2024-02-19 PROCEDURE — 96365 THER/PROPH/DIAG IV INF INIT: CPT | Mod: INF

## 2024-02-19 PROCEDURE — 2500000004 HC RX 250 GENERAL PHARMACY W/ HCPCS (ALT 636 FOR OP/ED): Performed by: INTERNAL MEDICINE

## 2024-02-19 PROCEDURE — 80053 COMPREHEN METABOLIC PANEL: CPT

## 2024-02-19 RX ORDER — DIPHENHYDRAMINE HYDROCHLORIDE 50 MG/ML
50 INJECTION INTRAMUSCULAR; INTRAVENOUS AS NEEDED
OUTPATIENT
Start: 2024-02-19

## 2024-02-19 RX ORDER — ALBUTEROL SULFATE 0.83 MG/ML
3 SOLUTION RESPIRATORY (INHALATION) AS NEEDED
OUTPATIENT
Start: 2024-02-19

## 2024-02-19 RX ORDER — HEPARIN SODIUM,PORCINE/PF 10 UNIT/ML
10 SYRINGE (ML) INTRAVENOUS AS NEEDED
Status: DISCONTINUED | OUTPATIENT
Start: 2024-02-19 | End: 2024-02-19 | Stop reason: HOSPADM

## 2024-02-19 RX ORDER — FAMOTIDINE 10 MG/ML
20 INJECTION INTRAVENOUS ONCE AS NEEDED
OUTPATIENT
Start: 2024-02-19

## 2024-02-19 RX ORDER — CEFEPIME HYDROCHLORIDE 2 G/50ML
2 INJECTION, SOLUTION INTRAVENOUS ONCE
Status: COMPLETED | OUTPATIENT
Start: 2024-02-19 | End: 2024-02-19

## 2024-02-19 RX ORDER — EPINEPHRINE 0.3 MG/.3ML
0.3 INJECTION SUBCUTANEOUS EVERY 5 MIN PRN
OUTPATIENT
Start: 2024-02-19

## 2024-02-19 RX ORDER — CEFEPIME HYDROCHLORIDE 2 G/50ML
2 INJECTION, SOLUTION INTRAVENOUS ONCE
Status: CANCELLED
Start: 2024-02-19 | End: 2024-02-19

## 2024-02-19 RX ADMIN — CEFEPIME HYDROCHLORIDE 2 G: 2 INJECTION, SOLUTION INTRAVENOUS at 14:57

## 2024-02-19 RX ADMIN — DAPTOMYCIN 500 MG: 500 INJECTION, POWDER, LYOPHILIZED, FOR SOLUTION INTRAVENOUS at 14:55

## 2024-02-19 RX ADMIN — Medication 10 UNITS: at 15:40

## 2024-02-19 ASSESSMENT — COLUMBIA-SUICIDE SEVERITY RATING SCALE - C-SSRS
6. HAVE YOU EVER DONE ANYTHING, STARTED TO DO ANYTHING, OR PREPARED TO DO ANYTHING TO END YOUR LIFE?: NO
2. HAVE YOU ACTUALLY HAD ANY THOUGHTS OF KILLING YOURSELF?: NO
1. IN THE PAST MONTH, HAVE YOU WISHED YOU WERE DEAD OR WISHED YOU COULD GO TO SLEEP AND NOT WAKE UP?: NO

## 2024-02-19 ASSESSMENT — PAIN SCALES - GENERAL: PAINLEVEL: 0-NO PAIN

## 2024-02-19 NOTE — PROGRESS NOTES
PICC - Adult 02/19/24 Single lumen right brachial vein    Placement date 02/19/24 Removal date --2/26/24   Placement time 1430 Removal time --1430   Site Right brachial Days    Placed by External Staff?:  Hand Hygiene Completed: yes   Catheter Time Out Checklist Completed:  Size (Fr): 4fr   Lumen Type: Single lumen Catheter to Vein Ratio Less Than 50%: yes   Total Length (cm): 42 cm; 0 cm out Orientation:    Site Prep: Alcohol;Betadine;Chlorhexidine ;Usual sterile procedure followed Local Anesthetic: yes   Insertion Team Members In The Room:  Initial Extremity Circumference (cm): 32 cm   Placed by: GIDEON Ospina Insertion attempts:    Patient Tolerance: Tolerated well Comfort Measures: Subcutaneous anesthetic   Procedure Location: Bedside Safety Measures: Patient specific safety measures addressed with RN   Estimated Blood Loss (mL): 3 mL Vessel Fully Compressible Proximally and Distally to Insertion Site: Yes   Brisk Blood Return Obtained and Line Draws Easily: Yes Tip Location: Cavo-atrial juncture   Line Confirmation: Blood return; ECG Lot #: [id number]   : BARD PICC Line Exp Date: 12/31/24   Post Procedure Checklist: Handoff with RN     Assessments    Assessments  Row Name 10/03/23 0955   Site Assessment Clean;Dry;Intact   Proximal Lumen Status Capped   Dressing Type Transparent   Dressing Status Dry;Clean

## 2024-02-21 ENCOUNTER — OFFICE VISIT (OUTPATIENT)
Dept: WOUND CARE | Facility: HOSPITAL | Age: 59
End: 2024-02-21
Payer: COMMERCIAL

## 2024-02-21 PROCEDURE — 11042 DBRDMT SUBQ TIS 1ST 20SQCM/<: CPT

## 2024-02-26 ENCOUNTER — INFUSION (OUTPATIENT)
Dept: HEMATOLOGY/ONCOLOGY | Facility: HOSPITAL | Age: 59
End: 2024-02-26
Payer: COMMERCIAL

## 2024-02-26 VITALS
DIASTOLIC BLOOD PRESSURE: 80 MMHG | TEMPERATURE: 98.4 F | SYSTOLIC BLOOD PRESSURE: 164 MMHG | OXYGEN SATURATION: 98 % | RESPIRATION RATE: 16 BRPM | HEART RATE: 82 BPM

## 2024-02-26 DIAGNOSIS — E87.6 HYPOKALEMIA: ICD-10-CM

## 2024-02-26 DIAGNOSIS — M86.179: Primary | ICD-10-CM

## 2024-02-26 PROCEDURE — 96523 IRRIG DRUG DELIVERY DEVICE: CPT

## 2024-02-26 PROCEDURE — 2500000004 HC RX 250 GENERAL PHARMACY W/ HCPCS (ALT 636 FOR OP/ED): Performed by: INTERNAL MEDICINE

## 2024-02-26 RX ORDER — HEPARIN SODIUM,PORCINE/PF 10 UNIT/ML
50 SYRINGE (ML) INTRAVENOUS AS NEEDED
OUTPATIENT
Start: 2024-02-26

## 2024-02-26 RX ORDER — POTASSIUM CHLORIDE 1500 MG/1
TABLET, EXTENDED RELEASE ORAL
Qty: 180 TABLET | Refills: 0 | Status: SHIPPED | OUTPATIENT
Start: 2024-02-26

## 2024-02-26 RX ORDER — HEPARIN 100 UNIT/ML
500 SYRINGE INTRAVENOUS AS NEEDED
Status: DISCONTINUED | OUTPATIENT
Start: 2024-02-26 | End: 2024-02-26 | Stop reason: HOSPADM

## 2024-02-26 RX ORDER — HEPARIN SODIUM,PORCINE/PF 10 UNIT/ML
50 SYRINGE (ML) INTRAVENOUS AS NEEDED
Status: DISCONTINUED | OUTPATIENT
Start: 2024-02-26 | End: 2024-02-26 | Stop reason: HOSPADM

## 2024-02-26 RX ORDER — HEPARIN 100 UNIT/ML
500 SYRINGE INTRAVENOUS AS NEEDED
OUTPATIENT
Start: 2024-02-26

## 2024-02-26 RX ADMIN — Medication 50 UNITS: at 15:34

## 2024-02-26 ASSESSMENT — PAIN SCALES - GENERAL: PAINLEVEL: 8

## 2024-02-26 NOTE — PROGRESS NOTES
Patient ID: Sabrina Michaud is a 58 y.o. female.    Procedures    PICC - Adult 2-26-24 Single lumen Left Basilic vein    Placement date 2/26/24 Removal date --   Placement time 1500 Removal time --   Site Left basilic veine Days    Placed by External Staff?:  Hand Hygiene Completed: yes   Catheter Time Out Checklist Completed:  Size (Fr): 4fr   Lumen Type: Single lumen Catheter to Vein Ratio Less Than 50%: yes   Total Length (cm): 47 cm; 0 out Orientation:    Site Prep: Alcohol;Betadine;Chlorhexidine ;Usual sterile procedure followed Local Anesthetic: yes   Insertion Team Members In The Room:  Initial Extremity Circumference (cm): 31 cm   Placed by: GIDEON Ospina Insertion attempts: a   Patient Tolerance: Tolerated well Comfort Measures: Subcutaneous anesthetic   Procedure Location: Bedside Safety Measures: Patient specific safety measures addressed with RN   Estimated Blood Loss (mL): 2mL Vessel Fully Compressible Proximally and Distally to Insertion Site: Yes   Brisk Blood Return Obtained and Line Draws Easily: Yes Tip Location: Distal SVC   Line Confirmation: Blood return; ECG Lot #: [id number]   : BARD PICC Line Exp Date: 12/31/24   Post Procedure Checklist: Handoff with RN     Assessments    Assessments  Row Name 10/03/23 0955   Site Assessment Clean;Dry;Intact   Proximal Lumen Status Capped   Dressing Type Transparent   Dressing Status Dry;Clean

## 2024-02-27 NOTE — H&P (VIEW-ONLY)
"  [facility]  [facility]    ADMIT DATE: 02/26/2024    Patient Name:  Sabrina Michaud  Medical Record Number:    [id number]  Date of Birth/Sex:  1965 (58 y.o. Female)  Primary Care Provider:    Trell Pierre  Referring Provider: Sundeep Harris  Weeks in Treatment: 0  Date of Service:    2/26/2024 1:30 PM  Patient Account Number:   [id number]  Treating RN:   Rachael Krueger  Other Clinician:  Treating Provider/Extender:    Rubi Hook  Chief Complaint  Information obtained from patient.  2/26/2024 - Rt foot ulcer.    Patient History  Information obtained from patient, chart.    Allergies  VANCOMYCIN (Reaction: \"red man syndrome\").    Family History  Cancer.    Social History  Never smoker. Alcohol use - never - quit. Drug use - no history. Caffeine use  - daily. Marital status.    Medical History  Cardiovascular  Patient has history of hypertension.  Denies history of angina, arrhythmia, congestive heart failure, coronary  artery disease, deep vein thrombosis, hyperlipidemia, hypotension, myocardial  infarction, peripheral arterial disease, peripheral venous disease,  phlebitis, stroke/TIA, vasculitis.  Endocrine  Patient has history of type 2 diabetes.  Denies history of hyperthyroid, hypothyroid, gestational diabetes, type 1  diabetes.  Gastrointestinal  Patient has history of cirrhosis - ETOH, no longer drinking.  Denies history of colitis, Crohn's, hepatitis - other, hepatitis A, hepatitis  B, hepatitis C.      Page   1   of    5    [2]  [4]    Hematologic/Lymphatic  Patient has history of anemia - iron deficient.  Denies history of gammopathy, hemophilia, human immunodeficiency virus,  leukemia/lymphoma, leukocytopenia, lymphedema, lymphocytopenia,  myelodysplastic syndrome, polycythemia vera, sickle cell disease,  thrombocytopenia, thrombophilia.  Neurologic  Patient has history of neuropathy - peripheral, diabetic.  Denies history of dementia, " epilepsy/history of seizures, paraplegia,  quadriplegia.  Oncological  Patient has history of breast cancer - bilateral mastectomy.  Denies history of bleomycin use, history of radiation, history of  chemotherapy, cervical cancer, colon cancer, lung cancer, melanoma,  non-melanoma skin cancer, prostate cancer, skin.  Psychiatric  Patient has history of depression - anxiety.  Denies history of anorexia/bulimia, confinement anxiety, schizophrenia.  Wound History  Patient has history of wound treatment until now, lab work within past month,  history of infection associated with wound.  Denies history of circulation tests on legs.    Patient is treated with insulin, oral agents.    Hospitalization/Surgery History - bilateral mastectomy 2023.    Medical and Surgical History Notes  Wound History  Osteomyelitis, right hallux.    Review of Systems (ROS)  Integumentary (Skin)  Denies complaints or symptoms of bleeding or bruising tendency, change in  mole.  Cardiovascular  Denies complaints or symptoms of chest pain.  Ear/Nose/Mouth/Throat  Denies complaints or symptoms of chronic sinus problems or rhinitis, sore  throat or mouth sores, swollen glands in neck.  Endocrine  Denies complaints or symptoms of thyroid disease, excessive thirst/urination,  heat/cold intolerance, hepatitis.  Eyes  Denies complaints or symptoms of glaucoma, cataracts.  Genitourinary    [facility]    Sabrina Michaud                              [id number]  May 11, 1965  COPY                 Rubi Brunson NP [id number]    Page 2    of 4    D:  02/26/2024  07:28 pm        T:  02/26/2024       /MARCELO/maylin  [2]  [4]    Denies complaints or symptoms of frequent urination, blood in urine,  incontinence/dribbling, kidney failure/dialysis, kidney transplant.  Musculoskeletal  Denies complaints or symptoms of joint pain, joint stiffness, weakness of  muscles or joints, back pain.  Neurologic  Denies complaints or symptoms of frequent/recurring  headaches, lightheaded or  dizzy.  Psychiatric  Denies complaints or symptoms of depression, claustrophobia.  Respiratory  Denies complaints or symptoms of chronic or frequent coughs, spitting up  blood, shortness of breath/sleep apnea, asthma/emphysema/TB.  Constitutional Symptoms (General Health)  Denies complaints or symptoms of active, fatigue, fever, chills, frail,  height/weight appropriate, loss of appetite, malnourished, morbid obesity,  marked weight change, sedentary, night sweats, robust, well developed, well  nourished, dizzy, generalized weakness.  General Symptoms  Patient states: Energy level is good, physical function is good, social  functioning is good, mental health is good, health perception is good.    Patient seen for HBO H&P for a wound to right hallux >2 years. She has been  receiving wound care at [facility] with Dr. Trudy Foster and  wound has failed to heal despite standard wound care. MRI 2/8/24 indicative  of osteomyelitis, wound culture 1/24/2024 + MRSA. Patient had consult with  infectious disease Dr. Harris 2/19/2024 with RUE PICC placement. 2/19/2024  with initiation of IV daptomycin and cefepime. She is appropriate for  adjunctive hyperbaric oxygen therapy.    ROS:  General: Neg for weight loss, weight gain, fatigue, current acute illness.    Cardiovascular: Neg for CP, SOB, sweats, angina, orthopnea, PND.    Respiratory: Neg for SOB, cough, hemoptysis, wheezing, chest congestion.    Gastrointestinal: Neg for nausea, vomiting, abdominal pain, dysphagia,  diarrhea, constipation, hematochezia.    Urinary: Neg for dysuria, frequent UTIs, incontinence.    Musculoskeletal: Neg for muscle aches.    Neurologic: Neg for headaches, stroke-like symptoms, double vision.      [facility]    Sabrina Michaud                              [id number]  May 11, 1965  COPY                 Rubi Brunson NP [id number]    Page 3    of 4    D:  02/26/2024  07:28 pm         T:  02/26/2024       /SKO/  [2]  [4]    Lymphatic: Neg for lymphadenopathy.    Endocrine: DM 2.    Objective  Constitutional  Vitals Time Taken: 1:16 PM, Height: 64 in, Weight: 185 lbs, BMI: 31.8,  Temperature: 99.0 degrees F, Pulse: 85 bpm, Respiratory Rate: 18 breaths/min,  Blood Pressure: 187/86 mmHg, Pulse Oximetry: 97%.    Integumentary (Hair, Skin)  Positive right hallux wound.    GEN: AAO x4. NAD.    HEAD: NC/AT.    NECK: Supple, no LAD.    LUNGS: CTAB.    ABD: Soft, NT/ND, +BS.    EXT:    NEURO:    Assessment  Active problems  ICD-10  Other acute osteomyelitis, right ankle and foot.  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  Type 2 diabetes mellitus with foot ulcer.    Plan  Hyperbaric Oxygen Therapy:  Wound #1 Right Plantar Toe, Great:  Evaluate for HBO therapy.  Indication: Roy III.  If appropriate for treatment, begin HBOT per protocol:  2.0 KAIT for 90 minutes without air breaks.  One treatment per day (delivered Monday through Friday unless otherwise  specified in Special Instructions below):  Total Number of Treatments: 30.  Finger stick blood glucose pre- and post- HBOT treatment.    [facility]    Sabrina Michaud                              [id number]  May 11, 1965  COPY                 Rubi Brunson NP [id number]    Page 4    of 5    D:  02/26/2024  07:28 pm        T:  02/26/2024       /SKO/  [2]  [4]    Follow hyperbaric oxygen glycemia protocol.    Ordered repeat ABIs, last results 7/28/21. Also ordered prealbumin to be  drawn by Home [facility] with other weekly labs. Patient will follow up with  her PCP and reports results of recent A1C performed in office in January 2024.    Plan to initiate HBO once approved by insurance. Goals include wound healing,  reduction in size, control of infection.    I have discussed the plan of care with the patient, as well as the risks of  unhealed wounds of his variety.  We  discussed signs and symptoms of infection  and pt is encouraged to return to clinic for wound symptoms that include:  Increased pain, swelling, redness, drainage, n/v/f/c.  Pt was instructed in  care of current dressing and we will f/u next week for reevaulation.                                                                    [facility]    Sabrina Michaud                              [id number]  May 11, 1965  COPY                 Rubi Brunson NP [id number]    Page 5    of 5    D:  02/26/2024  07:28 pm        T:  02/26/2024       /MARCELO/maylin

## 2024-02-28 ENCOUNTER — CLINICAL SUPPORT (OUTPATIENT)
Dept: WOUND CARE | Facility: HOSPITAL | Age: 59
End: 2024-02-28
Payer: COMMERCIAL

## 2024-02-28 PROCEDURE — 11042 DBRDMT SUBQ TIS 1ST 20SQCM/<: CPT

## 2024-03-01 ENCOUNTER — TELEPHONE (OUTPATIENT)
Dept: HEMATOLOGY/ONCOLOGY | Facility: HOSPITAL | Age: 59
End: 2024-03-01
Payer: COMMERCIAL

## 2024-03-01 NOTE — TELEPHONE ENCOUNTER
Cristy from [facility] calls to receive office notes from Dr. Noland from before and after 1/17/24 for insurance purposes in regards to her genetic testing.  Faxed office notes from 12/19 and 1/18 to fax number [phone] with confirmation of delivery success.

## 2024-03-04 DIAGNOSIS — M86.179 OTHER ACUTE OSTEOMYELITIS, UNSPECIFIED ANKLE AND FOOT (MULTI): ICD-10-CM

## 2024-03-04 RX ORDER — GABAPENTIN 400 MG/1
400 CAPSULE ORAL 3 TIMES DAILY
Qty: 270 CAPSULE | Refills: 0 | Status: SHIPPED | OUTPATIENT
Start: 2024-03-04 | End: 2024-04-17 | Stop reason: HOSPADM

## 2024-03-06 ENCOUNTER — CLINICAL SUPPORT (OUTPATIENT)
Dept: WOUND CARE | Facility: HOSPITAL | Age: 59
End: 2024-03-06
Payer: COMMERCIAL

## 2024-03-06 PROCEDURE — 99213 OFFICE O/P EST LOW 20 MIN: CPT

## 2024-03-07 ENCOUNTER — PREP FOR PROCEDURE (OUTPATIENT)
Dept: PODIATRY | Facility: HOSPITAL | Age: 59
End: 2024-03-07
Payer: COMMERCIAL

## 2024-03-07 DIAGNOSIS — M86.071 ACUTE HEMATOGENOUS OSTEOMYELITIS OF RIGHT FOOT (MULTI): Primary | ICD-10-CM

## 2024-03-11 ENCOUNTER — ANESTHESIA EVENT (OUTPATIENT)
Dept: OPERATING ROOM | Facility: HOSPITAL | Age: 59
End: 2024-03-11
Payer: COMMERCIAL

## 2024-03-11 ENCOUNTER — HOSPITAL ENCOUNTER (OUTPATIENT)
Facility: HOSPITAL | Age: 59
Setting detail: OUTPATIENT SURGERY
Discharge: HOME | End: 2024-03-11
Attending: PODIATRIST | Admitting: PODIATRIST
Payer: COMMERCIAL

## 2024-03-11 ENCOUNTER — ANESTHESIA (OUTPATIENT)
Dept: OPERATING ROOM | Facility: HOSPITAL | Age: 59
End: 2024-03-11
Payer: COMMERCIAL

## 2024-03-11 VITALS
HEART RATE: 87 BPM | WEIGHT: 187.39 LBS | HEIGHT: 64 IN | RESPIRATION RATE: 17 BRPM | DIASTOLIC BLOOD PRESSURE: 60 MMHG | OXYGEN SATURATION: 98 % | SYSTOLIC BLOOD PRESSURE: 148 MMHG | TEMPERATURE: 96.8 F | BODY MASS INDEX: 31.99 KG/M2

## 2024-03-11 DIAGNOSIS — M86.071 ACUTE HEMATOGENOUS OSTEOMYELITIS OF RIGHT FOOT (MULTI): ICD-10-CM

## 2024-03-11 DIAGNOSIS — N61.0 CELLULITIS OF LEFT BREAST: ICD-10-CM

## 2024-03-11 PROBLEM — R11.2 PONV (POSTOPERATIVE NAUSEA AND VOMITING): Status: ACTIVE | Noted: 2024-03-11

## 2024-03-11 PROBLEM — Z98.890 PONV (POSTOPERATIVE NAUSEA AND VOMITING): Status: ACTIVE | Noted: 2024-03-11

## 2024-03-11 LAB — GLUCOSE BLD MANUAL STRIP-MCNC: 100 MG/DL (ref 74–99)

## 2024-03-11 PROCEDURE — A28820 PR AMPUTATION TOE,MT-P JT: Performed by: NURSE ANESTHETIST, CERTIFIED REGISTERED

## 2024-03-11 PROCEDURE — 7100000010 HC PHASE TWO TIME - EACH INCREMENTAL 1 MINUTE: Performed by: PODIATRIST

## 2024-03-11 PROCEDURE — 82947 ASSAY GLUCOSE BLOOD QUANT: CPT

## 2024-03-11 PROCEDURE — 3600000008 HC OR TIME - EACH INCREMENTAL 1 MINUTE - PROCEDURE LEVEL THREE: Performed by: PODIATRIST

## 2024-03-11 PROCEDURE — 7100000009 HC PHASE TWO TIME - INITIAL BASE CHARGE: Performed by: PODIATRIST

## 2024-03-11 PROCEDURE — 2500000004 HC RX 250 GENERAL PHARMACY W/ HCPCS (ALT 636 FOR OP/ED): Performed by: PODIATRIST

## 2024-03-11 PROCEDURE — 3700000002 HC GENERAL ANESTHESIA TIME - EACH INCREMENTAL 1 MINUTE: Performed by: PODIATRIST

## 2024-03-11 PROCEDURE — 88311 DECALCIFY TISSUE: CPT | Performed by: PATHOLOGY

## 2024-03-11 PROCEDURE — 2500000004 HC RX 250 GENERAL PHARMACY W/ HCPCS (ALT 636 FOR OP/ED): Performed by: ANESTHESIOLOGY

## 2024-03-11 PROCEDURE — 3600000003 HC OR TIME - INITIAL BASE CHARGE - PROCEDURE LEVEL THREE: Performed by: PODIATRIST

## 2024-03-11 PROCEDURE — 2500000005 HC RX 250 GENERAL PHARMACY W/O HCPCS: Performed by: PODIATRIST

## 2024-03-11 PROCEDURE — 2500000005 HC RX 250 GENERAL PHARMACY W/O HCPCS: Performed by: NURSE ANESTHETIST, CERTIFIED REGISTERED

## 2024-03-11 PROCEDURE — 87070 CULTURE OTHR SPECIMN AEROBIC: CPT | Mod: GEALAB | Performed by: PODIATRIST

## 2024-03-11 PROCEDURE — 0753T DGTZ GLS MCRSCP SLD LEVEL IV: CPT | Mod: TC,GEALAB | Performed by: PODIATRIST

## 2024-03-11 PROCEDURE — 2500000004 HC RX 250 GENERAL PHARMACY W/ HCPCS (ALT 636 FOR OP/ED): Performed by: NURSE ANESTHETIST, CERTIFIED REGISTERED

## 2024-03-11 PROCEDURE — 2720000007 HC OR 272 NO HCPCS: Performed by: PODIATRIST

## 2024-03-11 PROCEDURE — 88305 TISSUE EXAM BY PATHOLOGIST: CPT | Performed by: PATHOLOGY

## 2024-03-11 PROCEDURE — 3700000001 HC GENERAL ANESTHESIA TIME - INITIAL BASE CHARGE: Performed by: PODIATRIST

## 2024-03-11 RX ORDER — PROPOFOL 10 MG/ML
INJECTION, EMULSION INTRAVENOUS AS NEEDED
Status: DISCONTINUED | OUTPATIENT
Start: 2024-03-11 | End: 2024-03-11

## 2024-03-11 RX ORDER — SODIUM CHLORIDE, SODIUM LACTATE, POTASSIUM CHLORIDE, CALCIUM CHLORIDE 600; 310; 30; 20 MG/100ML; MG/100ML; MG/100ML; MG/100ML
INJECTION, SOLUTION INTRAVENOUS CONTINUOUS PRN
Status: DISCONTINUED | OUTPATIENT
Start: 2024-03-11 | End: 2024-03-11

## 2024-03-11 RX ORDER — ONDANSETRON HYDROCHLORIDE 2 MG/ML
8 INJECTION, SOLUTION INTRAVENOUS ONCE
Status: CANCELLED | OUTPATIENT
Start: 2024-03-11 | End: 2024-03-11

## 2024-03-11 RX ORDER — LIDOCAINE HYDROCHLORIDE 10 MG/ML
INJECTION, SOLUTION EPIDURAL; INFILTRATION; INTRACAUDAL; PERINEURAL AS NEEDED
Status: DISCONTINUED | OUTPATIENT
Start: 2024-03-11 | End: 2024-03-11

## 2024-03-11 RX ORDER — ALBUTEROL SULFATE 0.83 MG/ML
2.5 SOLUTION RESPIRATORY (INHALATION) ONCE AS NEEDED
Status: CANCELLED | OUTPATIENT
Start: 2024-03-11

## 2024-03-11 RX ORDER — MIDAZOLAM HYDROCHLORIDE 1 MG/ML
INJECTION INTRAMUSCULAR; INTRAVENOUS AS NEEDED
Status: DISCONTINUED | OUTPATIENT
Start: 2024-03-11 | End: 2024-03-11

## 2024-03-11 RX ORDER — BUPIVACAINE HYDROCHLORIDE 5 MG/ML
INJECTION, SOLUTION EPIDURAL; INTRACAUDAL AS NEEDED
Status: DISCONTINUED | OUTPATIENT
Start: 2024-03-11 | End: 2024-03-11 | Stop reason: HOSPADM

## 2024-03-11 RX ORDER — OXYCODONE AND ACETAMINOPHEN 5; 325 MG/1; MG/1
1 TABLET ORAL EVERY 6 HOURS PRN
Qty: 28 TABLET | Refills: 0 | Status: SHIPPED | OUTPATIENT
Start: 2024-03-11 | End: 2024-03-18

## 2024-03-11 RX ORDER — ONDANSETRON HYDROCHLORIDE 2 MG/ML
INJECTION, SOLUTION INTRAVENOUS AS NEEDED
Status: DISCONTINUED | OUTPATIENT
Start: 2024-03-11 | End: 2024-03-11

## 2024-03-11 RX ORDER — KETOROLAC TROMETHAMINE 30 MG/ML
INJECTION, SOLUTION INTRAMUSCULAR; INTRAVENOUS AS NEEDED
Status: DISCONTINUED | OUTPATIENT
Start: 2024-03-11 | End: 2024-03-11

## 2024-03-11 RX ORDER — FENTANYL CITRATE 50 UG/ML
INJECTION, SOLUTION INTRAMUSCULAR; INTRAVENOUS AS NEEDED
Status: DISCONTINUED | OUTPATIENT
Start: 2024-03-11 | End: 2024-03-11

## 2024-03-11 RX ORDER — ACETAMINOPHEN 325 MG/1
650 TABLET ORAL EVERY 4 HOURS PRN
Status: CANCELLED | OUTPATIENT
Start: 2024-03-11

## 2024-03-11 RX ORDER — PROPOFOL 10 MG/ML
INJECTION, EMULSION INTRAVENOUS CONTINUOUS PRN
Status: DISCONTINUED | OUTPATIENT
Start: 2024-03-11 | End: 2024-03-11

## 2024-03-11 RX ORDER — SODIUM CHLORIDE, SODIUM LACTATE, POTASSIUM CHLORIDE, CALCIUM CHLORIDE 600; 310; 30; 20 MG/100ML; MG/100ML; MG/100ML; MG/100ML
100 INJECTION, SOLUTION INTRAVENOUS CONTINUOUS
Status: DISCONTINUED | OUTPATIENT
Start: 2024-03-11 | End: 2024-03-11 | Stop reason: HOSPADM

## 2024-03-11 RX ADMIN — SODIUM CHLORIDE, POTASSIUM CHLORIDE, SODIUM LACTATE AND CALCIUM CHLORIDE: 600; 310; 30; 20 INJECTION, SOLUTION INTRAVENOUS at 07:32

## 2024-03-11 RX ADMIN — PROPOFOL 200 MCG/KG/MIN: 10 INJECTION, EMULSION INTRAVENOUS at 07:35

## 2024-03-11 RX ADMIN — ONDANSETRON 4 MG: 2 INJECTION INTRAMUSCULAR; INTRAVENOUS at 07:46

## 2024-03-11 RX ADMIN — KETOROLAC TROMETHAMINE 15 MG: 30 INJECTION, SOLUTION INTRAMUSCULAR; INTRAVENOUS at 08:08

## 2024-03-11 RX ADMIN — SODIUM CHLORIDE, POTASSIUM CHLORIDE, SODIUM LACTATE AND CALCIUM CHLORIDE 100 ML/HR: 600; 310; 30; 20 INJECTION, SOLUTION INTRAVENOUS at 06:45

## 2024-03-11 RX ADMIN — MIDAZOLAM HYDROCHLORIDE 2 MG: 1 INJECTION, SOLUTION INTRAMUSCULAR; INTRAVENOUS at 07:32

## 2024-03-11 RX ADMIN — LIDOCAINE HYDROCHLORIDE 50 MG: 10 INJECTION, SOLUTION EPIDURAL; INFILTRATION; INTRACAUDAL; PERINEURAL at 07:35

## 2024-03-11 RX ADMIN — PROPOFOL 50 MG: 10 INJECTION, EMULSION INTRAVENOUS at 07:35

## 2024-03-11 RX ADMIN — HYDROMORPHONE HYDROCHLORIDE 0.5 MG: 1 INJECTION, SOLUTION INTRAMUSCULAR; INTRAVENOUS; SUBCUTANEOUS at 08:38

## 2024-03-11 RX ADMIN — FENTANYL CITRATE 100 MCG: 50 INJECTION, SOLUTION INTRAMUSCULAR; INTRAVENOUS at 08:07

## 2024-03-11 SDOH — HEALTH STABILITY: MENTAL HEALTH: CURRENT SMOKER: 0

## 2024-03-11 ASSESSMENT — PAIN SCALES - GENERAL
PAINLEVEL_OUTOF10: 0 - NO PAIN
PAINLEVEL_OUTOF10: 7
PAINLEVEL_OUTOF10: 7

## 2024-03-11 ASSESSMENT — PAIN - FUNCTIONAL ASSESSMENT
PAIN_FUNCTIONAL_ASSESSMENT: 0-10
PAIN_FUNCTIONAL_ASSESSMENT: 0-10

## 2024-03-11 NOTE — BRIEF OP NOTE
Date: 3/11/2024  OR Location: [facility] OR    Name: Sabrina Michaud, : 1965, Age: 58 y.o., MRN: [id number], Sex: female    Diagnosis  Pre-op Diagnosis     * Acute hematogenous osteomyelitis of right foot (CMS/HCC) [M86.071] Post-op Diagnosis     * Acute hematogenous osteomyelitis of right foot (CMS/HCC) [M86.071]     Procedures  RIGHT HALLUX AMPUTATION  75000 - MD AMPUTATION TOE METATARSOPHALANGEAL JOINT      Surgeons      * Kaur Foster - Primary    Resident/Fellow/Other Assistant:  Surgeon(s) and Role:    Procedure Summary  Anesthesia: Monitor Anesthesia Care  ASA: III  Anesthesia Staff: CRNA: SNOW Wooten-CRNA  Estimated Blood Loss: 0 mL  Intra-op Medications:   Administrations occurring from 0730 to 0845 on 24:   Medication Name Total Dose   bupivacaine PF (Marcaine) 0.5 % (5 mg/mL) injection 7 mL   BUPivacaine HCl (Marcaine) 0.5 % (5 mg/mL) 6 mL, lidocaine PF (Xylocaine) 10 mg/mL (1 %) 6 mL syringe 12 mL              Anesthesia Record               Intraprocedure I/O Totals          Intake    Propofol Drip 0.00 mL    The total shown is the total volume documented since Anesthesia Start was filed.    LR infusion 600.00 mL    Total Intake 600 mL          Specimen:   ID Type Source Tests Collected by Time   1 : RIGHT HALLUX AMPUTATION Tissue DIGIT FIRST, RIGHT FOOT SURGICAL PATHOLOGY EXAM Kaur Foster DPM 3/11/2024 0749   A : RIGHT HALLUX AMPUTATION SITE Swab DIGIT FIRST, RIGHT FOOT TISSUE/WOUND CULTURE/SMEAR Kaur Foster [facility] 3/11/2024 0756        Staff:   Circulator: Cindy Serna RN  Relief Circulator: Kimberly Neff RN  Scrub Person: Sheree Ramirez RN          Findings: as ditated    Complications:  None; patient tolerated the procedure well.     Disposition: PACU - hemodynamically stable.  Condition: stable  Specimens Collected:   ID Type Source Tests Collected by Time   1 : RIGHT HALLUX AMPUTATION Tissue DIGIT FIRST, RIGHT FOOT SURGICAL PATHOLOGY EXAM Kaur SALMERON  SURAJ Foster 3/11/2024 0749   A : RIGHT HALLUX AMPUTATION SITE Swab DIGIT FIRST, RIGHT FOOT TISSUE/WOUND CULTURE/SMEAR Kaur Foster DPM 3/11/2024 0756     Attending Attestation: I performed the procedure.    Kaur Foster  Phone Number: [phone]

## 2024-03-11 NOTE — DISCHARGE INSTRUCTIONS
Keep dressing to right foot clean, dry and intact.  Ambulate in post-op shoe to right foot - limited.  Ice and elevate right foot  Rx for percocet 5/325, #28, one tab PO every 6 hours PRN pain x 7 days  Follow-up in wound care center in 9 days for follow-up

## 2024-03-11 NOTE — ANESTHESIA POSTPROCEDURE EVALUATION
Patient: Sabrina Michaud    Procedure Summary       Date: 03/11/24 Room / Location: [facility] OR 06 / [facility] OR    Anesthesia Start: 0732 Anesthesia Stop: 0813    Procedure: RIGHT HALLUX AMPUTATION (Right) Diagnosis:       Acute hematogenous osteomyelitis of right foot (CMS/HCC)      (Acute hematogenous osteomyelitis of right foot (CMS/HCC) [M86.071])    Surgeons: Kaur Foster DPM Responsible Provider: REJI Wooten    Anesthesia Type: MAC ASA Status: 3            Anesthesia Type: MAC    Vitals Value Taken Time   /60 03/11/24 0857   Temp 36 °C (96.8 °F) 03/11/24 0812   Pulse 87 03/11/24 0857   Resp 17 03/11/24 0857   SpO2 98 % 03/11/24 0842       Anesthesia Post Evaluation    Patient location during evaluation: PACU  Patient participation: complete - patient participated  Level of consciousness: awake  Pain management: adequate  Multimodal analgesia pain management approach  Airway patency: patent  Two or more strategies used to mitigate risk of obstructive sleep apnea  Cardiovascular status: acceptable  Respiratory status: acceptable  Hydration status: acceptable  Postoperative Nausea and Vomiting: none        No notable events documented.

## 2024-03-11 NOTE — ANESTHESIA PREPROCEDURE EVALUATION
Patient: Sabrina Michaud    Procedure Information       Date/Time: 03/11/24 0730    Procedure: RIGHT HALLUX AMPUTATION (Right)    Location: [facility] OR 06 / [facility] OR    Surgeons: Kaur Foster DPM            Relevant Problems   Anesthesia   (+) PONV (postoperative nausea and vomiting)      Cardiovascular   (+) Atrial tachycardia   (+) Benign essential hypertension      Endocrine   (+) Class 1 obesity with body mass index (BMI) of 31.0 to 31.9 in adult   (+) Diabetes mellitus type 2 in obese (CMS/HCC)      Neuro/Psych   (+) Anxiety   (+) Depression      Hematology   (+) Anemia   (+) Coagulation defect, unspecified (CMS/HCC)      Infectious Disease   (+) Acute hematogenous osteomyelitis of right foot (CMS/HCC)   (+) Acute osteomyelitis of foot (CMS/HCC)   (+) Other acute osteomyelitis, unspecified ankle and foot (CMS/HCC)      Other   (+) Acute hematogenous osteomyelitis of right foot (CMS/HCC)   (+) Acute osteomyelitis of foot (CMS/HCC)   (+) Other acute osteomyelitis, unspecified ankle and foot (CMS/HCC)       Clinical information reviewed:    Allergies  Meds               NPO Detail:  NPO/Void Status  Date of Last Solid: 03/10/24  Time of Last Solid: 2030  Last Intake Type: Food         Physical Exam    Airway  Mallampati: III  TM distance: >3 FB  Neck ROM: full     Cardiovascular   Rhythm: regular  Rate: normal     Dental - normal exam       Pulmonary   Breath sounds clear to auscultation     Abdominal        Anesthesia Plan    History of general anesthesia?: yes  History of complications of general anesthesia?: no    ASA 3     MAC     The patient is not a current smoker.    intravenous induction   Anesthetic plan and risks discussed with patient.    Plan discussed with CRNA and attending.

## 2024-03-12 ENCOUNTER — TELEPHONE (OUTPATIENT)
Dept: HEMATOLOGY/ONCOLOGY | Facility: HOSPITAL | Age: 59
End: 2024-03-12
Payer: COMMERCIAL

## 2024-03-12 NOTE — TELEPHONE ENCOUNTER
Received PICC Eval/PICC Line Replacement order from Dr. Harris. Patient previously seen in infusion center on Monday, 2/26/24, for PICC placement. After PICC was placed, patient received home infusions. Reached out to Rachel Sinclair for her upcoming availability. Per Pat, Thursday is the earliest availability at 1400. Attempted to reach patient to schedule. Left detailed message, with call back number, for patient to call back at earliest convenience.

## 2024-03-13 ENCOUNTER — APPOINTMENT (OUTPATIENT)
Dept: WOUND CARE | Facility: HOSPITAL | Age: 59
End: 2024-03-13
Payer: COMMERCIAL

## 2024-03-14 LAB
BACTERIA SPEC CULT: ABNORMAL
GRAM STN SPEC: ABNORMAL
GRAM STN SPEC: ABNORMAL

## 2024-03-14 NOTE — TELEPHONE ENCOUNTER
Received PICC Eval/PICC Line Replacement order from Dr. Harris. Patient previously seen in infusion center on Monday, 2/26/24, for PICC placement. After PICC was placed, patient received home infusions. Reached out to patient to schedule PICC Eval/PICC Line Replacement. Per patient, she just had surgery on Monday, 3/11/24 and PICC Line is no longer needed. No future appointments scheduled. Patient verbalized understanding and agreed to plan of care.

## 2024-03-17 NOTE — OP NOTE
RIGHT HALLUX AMPUTATION (R) Operative Note     Date: 3/11/2024  OR Location: [facility] OR    Name: Sabrina Michaud, : 1965, Age: 58 y.o., MRN: [id number], Sex: female    Diagnosis  Pre-op Diagnosis     * Acute hematogenous osteomyelitis of right foot (CMS/HCC) [M86.071] Post-op Diagnosis     * Acute hematogenous osteomyelitis of right foot (CMS/HCC) [M86.071]     Procedures  RIGHT HALLUX AMPUTATION  40782 - IN AMPUTATION TOE METATARSOPHALANGEAL JOINT      Surgeons      * Kaur Foster - Primary    Resident/Fellow/Other Assistant:  Surgeon(s) and Role:    Procedure Summary  Anesthesia: Monitor Anesthesia Care  ASA: III  Anesthesia Staff: CRNA: SNOW Wooten-CRNA  Estimated Blood Loss: 5 mL  Intra-op Medications:   Administrations occurring from 0730 to 0845 on 24:   Medication Name Total Dose   bupivacaine PF (Marcaine) 0.5 % (5 mg/mL) injection 7 mL   BUPivacaine HCl (Marcaine) 0.5 % (5 mg/mL) 6 mL, lidocaine PF (Xylocaine) 10 mg/mL (1 %) 6 mL syringe 12 mL   HYDROmorphone (Dilaudid) injection 0.5 mg 0.5 mg              Anesthesia Record               Intraprocedure I/O Totals          Intake    Propofol Drip 0.00 mL    The total shown is the total volume documented since Anesthesia Start was filed.    LR infusion 600.00 mL    Total Intake 600 mL          Specimen:   ID Type Source Tests Collected by Time   1 : RIGHT HALLUX AMPUTATION Tissue DIGIT FIRST, RIGHT FOOT SURGICAL PATHOLOGY EXAM Kaur Foster DPM 3/11/2024 0749   A : RIGHT HALLUX AMPUTATION SITE Swab DIGIT FIRST, RIGHT FOOT TISSUE/WOUND CULTURE/SMEAR Kaur Foster DPM 3/11/2024 0756        Staff:   Circulator: Cindy Serna RN  Relief Circulator: Kimberly Neff RN  Scrub Person: Sheree Ramirez RN         Drains and/or Catheters: * None in log *    Tourniquet Times:     Total Tourniquet Time Documented:  Leg (Right) - 22 minutes  Total: Leg (Right) - 22 minutes      Implants:     Findings: as dictated    Indications:  Sabrina Michaud is an 58 y.o. female who is having surgery for Acute hematogenous osteomyelitis of right foot (CMS/[facility]) [M86.071].      The patient was seen in the preoperative area. The risks, benefits, complications, treatment options, non-operative alternatives, expected recovery and outcomes were discussed with the patient. The possibilities of reaction to medication, pulmonary aspiration, injury to surrounding structures, bleeding, recurrent infection, the need for additional procedures, failure to diagnose a condition, and creating a complication requiring transfusion or operation were discussed with the patient. The patient concurred with the proposed plan, giving informed consent.  The site of surgery was properly noted/marked if necessary per policy. The patient has been actively warmed in preoperative area. Preoperative antibiotics are not indicated. Venous thrombosis prophylaxis are not indicated.    Procedure Details:   Patient brought to operating room and remained on OR bed for surgical procedure.  Huddle and Time out performed.  After induction of IV sedation, 12 ml's of 1:1 mixture of 1% lidocaine plain and 0.5% marcaine plain injected around right hallux metatarsal block fashion.  Left foot and lower leg scrubbed, prepped and draped with iodine.      Attention was then directed to right  where a fish mouth incision was made over the toe base excising the plantar ulceration in its entirity;  The  right hallux was then disarticulated at the MPJ and passed from operative field.   A deep C&S was then taken.  The 1st metatarsal head cartilage was intact and glistening white. No proximal purulence.  Adequate perfusion to site.  Any bleeding controlled with bovie electrocautery.  Irrigated with copious amounts of saline.  Closed subcutaneous tissue with 3-0 vicryl, skin closed with 3-0 prolene.  Dressed with adaptic, 4x4's, kerlix and light ace bandage.    Patient tolerated procedure and anesthesia well.   Transferred to PACU with VSS and VSI to all remaining toes right foot.  After a brief period of post-operative monitoring, the patient will be transferred back to the floor for further medical management.        Complications:  None; patient tolerated the procedure well.    Disposition: PACU - hemodynamically stable.  Condition: stable         Additional Details: as dictated    Attending Attestation: I performed the procedure.    Kaur Foster  Phone Number: [phone]

## 2024-03-19 ENCOUNTER — APPOINTMENT (OUTPATIENT)
Dept: PRIMARY CARE | Facility: CLINIC | Age: 59
End: 2024-03-19
Payer: COMMERCIAL

## 2024-03-19 LAB
LABORATORY COMMENT REPORT: NORMAL
PATH REPORT.FINAL DX SPEC: NORMAL
PATH REPORT.GROSS SPEC: NORMAL
PATH REPORT.RELEVANT HX SPEC: NORMAL
PATH REPORT.TOTAL CANCER: NORMAL

## 2024-03-20 ENCOUNTER — CLINICAL SUPPORT (OUTPATIENT)
Dept: WOUND CARE | Facility: HOSPITAL | Age: 59
End: 2024-03-20
Payer: COMMERCIAL

## 2024-03-20 DIAGNOSIS — L97.518 NON-PRESSURE CHRONIC ULCER OF OTHER PART OF RIGHT FOOT WITH OTHER SPECIFIED SEVERITY (MULTI): Primary | ICD-10-CM

## 2024-03-20 PROCEDURE — 99212 OFFICE O/P EST SF 10 MIN: CPT

## 2024-03-20 RX ORDER — OXYCODONE AND ACETAMINOPHEN 5; 325 MG/1; MG/1
1 TABLET ORAL EVERY 6 HOURS PRN
Qty: 28 TABLET | Refills: 0 | Status: SHIPPED | OUTPATIENT
Start: 2024-03-20 | End: 2024-03-27

## 2024-03-26 DIAGNOSIS — E66.9 DIABETES MELLITUS TYPE 2 IN OBESE: ICD-10-CM

## 2024-03-26 DIAGNOSIS — E11.69 DIABETES MELLITUS TYPE 2 IN OBESE: ICD-10-CM

## 2024-03-26 RX ORDER — SITAGLIPTIN AND METFORMIN HYDROCHLORIDE 500; 50 MG/1; MG/1
1 TABLET, FILM COATED ORAL
Qty: 180 TABLET | Refills: 0 | Status: SHIPPED | OUTPATIENT
Start: 2024-03-26

## 2024-03-27 ENCOUNTER — CLINICAL SUPPORT (OUTPATIENT)
Dept: WOUND CARE | Facility: HOSPITAL | Age: 59
End: 2024-03-27
Payer: COMMERCIAL

## 2024-03-27 DIAGNOSIS — Z89.411 ACQUIRED ABSENCE OF RIGHT GREAT TOE (MULTI): Primary | ICD-10-CM

## 2024-03-27 PROCEDURE — 99213 OFFICE O/P EST LOW 20 MIN: CPT

## 2024-03-27 RX ORDER — TRAMADOL HYDROCHLORIDE 50 MG/1
50 TABLET ORAL EVERY 6 HOURS PRN
Qty: 20 TABLET | Refills: 0 | Status: SHIPPED | OUTPATIENT
Start: 2024-03-27 | End: 2024-03-27

## 2024-03-27 RX ORDER — TRAMADOL HYDROCHLORIDE 50 MG/1
50 TABLET ORAL EVERY 6 HOURS PRN
Qty: 15 TABLET | Refills: 0 | Status: SHIPPED | OUTPATIENT
Start: 2024-03-27 | End: 2024-04-03

## 2024-04-03 ENCOUNTER — CLINICAL SUPPORT (OUTPATIENT)
Dept: WOUND CARE | Facility: HOSPITAL | Age: 59
End: 2024-04-03
Payer: COMMERCIAL

## 2024-04-03 PROCEDURE — 99212 OFFICE O/P EST SF 10 MIN: CPT

## 2024-04-08 ENCOUNTER — APPOINTMENT (OUTPATIENT)
Dept: RADIOLOGY | Facility: HOSPITAL | Age: 59
End: 2024-04-08
Payer: COMMERCIAL

## 2024-04-10 ENCOUNTER — APPOINTMENT (OUTPATIENT)
Dept: WOUND CARE | Facility: HOSPITAL | Age: 59
End: 2024-04-10
Payer: COMMERCIAL

## 2024-04-16 ENCOUNTER — APPOINTMENT (OUTPATIENT)
Dept: HEMATOLOGY/ONCOLOGY | Facility: CLINIC | Age: 59
End: 2024-04-16
Payer: COMMERCIAL

## 2024-04-17 ENCOUNTER — APPOINTMENT (OUTPATIENT)
Dept: WOUND CARE | Facility: HOSPITAL | Age: 59
End: 2024-04-17
Payer: COMMERCIAL

## 2024-04-17 ENCOUNTER — PHARMACY VISIT (OUTPATIENT)
Dept: PHARMACY | Facility: CLINIC | Age: 59
End: 2024-04-17
Payer: COMMERCIAL

## 2024-04-17 ENCOUNTER — HOSPITAL ENCOUNTER (OUTPATIENT)
Facility: HOSPITAL | Age: 59
Setting detail: OUTPATIENT SURGERY
Discharge: HOME | End: 2024-04-17
Attending: SPECIALIST | Admitting: SPECIALIST
Payer: COMMERCIAL

## 2024-04-17 ENCOUNTER — ANESTHESIA EVENT (OUTPATIENT)
Dept: OPERATING ROOM | Facility: HOSPITAL | Age: 59
End: 2024-04-17
Payer: COMMERCIAL

## 2024-04-17 ENCOUNTER — ANESTHESIA (OUTPATIENT)
Dept: OPERATING ROOM | Facility: HOSPITAL | Age: 59
End: 2024-04-17
Payer: COMMERCIAL

## 2024-04-17 VITALS
RESPIRATION RATE: 18 BRPM | TEMPERATURE: 97.3 F | OXYGEN SATURATION: 97 % | HEART RATE: 80 BPM | SYSTOLIC BLOOD PRESSURE: 167 MMHG | DIASTOLIC BLOOD PRESSURE: 69 MMHG

## 2024-04-17 DIAGNOSIS — L90.5 SCAR OF FEMALE BREAST: ICD-10-CM

## 2024-04-17 DIAGNOSIS — Z85.3 PERSONAL HISTORY OF BREAST CANCER: ICD-10-CM

## 2024-04-17 LAB
GLUCOSE BLD MANUAL STRIP-MCNC: 100 MG/DL (ref 74–99)
GLUCOSE BLD MANUAL STRIP-MCNC: 111 MG/DL (ref 74–99)

## 2024-04-17 PROCEDURE — 7100000009 HC PHASE TWO TIME - INITIAL BASE CHARGE: Performed by: SPECIALIST

## 2024-04-17 PROCEDURE — 82947 ASSAY GLUCOSE BLOOD QUANT: CPT

## 2024-04-17 PROCEDURE — 7100000001 HC RECOVERY ROOM TIME - INITIAL BASE CHARGE: Performed by: SPECIALIST

## 2024-04-17 PROCEDURE — 88305 TISSUE EXAM BY PATHOLOGIST: CPT | Performed by: PATHOLOGY

## 2024-04-17 PROCEDURE — 2500000004 HC RX 250 GENERAL PHARMACY W/ HCPCS (ALT 636 FOR OP/ED): Performed by: ANESTHESIOLOGY

## 2024-04-17 PROCEDURE — 2720000007 HC OR 272 NO HCPCS: Performed by: SPECIALIST

## 2024-04-17 PROCEDURE — 19380 REVJ RECONSTRUCTED BREAST: CPT | Performed by: PHYSICIAN ASSISTANT

## 2024-04-17 PROCEDURE — 3700000002 HC GENERAL ANESTHESIA TIME - EACH INCREMENTAL 1 MINUTE: Performed by: SPECIALIST

## 2024-04-17 PROCEDURE — RXMED WILLOW AMBULATORY MEDICATION CHARGE

## 2024-04-17 PROCEDURE — 3600000004 HC OR TIME - INITIAL BASE CHARGE - PROCEDURE LEVEL FOUR: Performed by: SPECIALIST

## 2024-04-17 PROCEDURE — 2500000005 HC RX 250 GENERAL PHARMACY W/O HCPCS: Performed by: NURSE ANESTHETIST, CERTIFIED REGISTERED

## 2024-04-17 PROCEDURE — 88305 TISSUE EXAM BY PATHOLOGIST: CPT | Mod: TC | Performed by: SPECIALIST

## 2024-04-17 PROCEDURE — 2500000004 HC RX 250 GENERAL PHARMACY W/ HCPCS (ALT 636 FOR OP/ED): Mod: JW | Performed by: SPECIALIST

## 2024-04-17 PROCEDURE — 7100000010 HC PHASE TWO TIME - EACH INCREMENTAL 1 MINUTE: Performed by: SPECIALIST

## 2024-04-17 PROCEDURE — A19380 PR REVISE BREAST RECONSTRUCTION: Performed by: NURSE ANESTHETIST, CERTIFIED REGISTERED

## 2024-04-17 PROCEDURE — 2500000004 HC RX 250 GENERAL PHARMACY W/ HCPCS (ALT 636 FOR OP/ED)

## 2024-04-17 PROCEDURE — 2500000005 HC RX 250 GENERAL PHARMACY W/O HCPCS: Performed by: SPECIALIST

## 2024-04-17 PROCEDURE — 3700000001 HC GENERAL ANESTHESIA TIME - INITIAL BASE CHARGE: Performed by: SPECIALIST

## 2024-04-17 PROCEDURE — A19380 PR REVISE BREAST RECONSTRUCTION: Performed by: ANESTHESIOLOGY

## 2024-04-17 PROCEDURE — 7100000002 HC RECOVERY ROOM TIME - EACH INCREMENTAL 1 MINUTE: Performed by: SPECIALIST

## 2024-04-17 PROCEDURE — 2500000001 HC RX 250 WO HCPCS SELF ADMINISTERED DRUGS (ALT 637 FOR MEDICARE OP): Performed by: SPECIALIST

## 2024-04-17 PROCEDURE — 3600000009 HC OR TIME - EACH INCREMENTAL 1 MINUTE - PROCEDURE LEVEL FOUR: Performed by: SPECIALIST

## 2024-04-17 PROCEDURE — 2500000004 HC RX 250 GENERAL PHARMACY W/ HCPCS (ALT 636 FOR OP/ED): Performed by: NURSE ANESTHETIST, CERTIFIED REGISTERED

## 2024-04-17 RX ORDER — MIDAZOLAM HYDROCHLORIDE 1 MG/ML
INJECTION, SOLUTION INTRAMUSCULAR; INTRAVENOUS AS NEEDED
Status: DISCONTINUED | OUTPATIENT
Start: 2024-04-17 | End: 2024-04-17

## 2024-04-17 RX ORDER — CEFUROXIME SODIUM 750 MG/1
1.5 INJECTION, POWDER, FOR SOLUTION INTRAMUSCULAR; INTRAVENOUS ONCE
Status: DISCONTINUED | OUTPATIENT
Start: 2024-04-17 | End: 2024-04-17

## 2024-04-17 RX ORDER — LIDOCAINE HYDROCHLORIDE 20 MG/ML
INJECTION, SOLUTION INFILTRATION; PERINEURAL AS NEEDED
Status: DISCONTINUED | OUTPATIENT
Start: 2024-04-17 | End: 2024-04-17

## 2024-04-17 RX ORDER — OXYCODONE AND ACETAMINOPHEN 5; 325 MG/1; MG/1
1 TABLET ORAL EVERY 6 HOURS PRN
Qty: 20 TABLET | Refills: 0 | Status: SHIPPED | OUTPATIENT
Start: 2024-04-17

## 2024-04-17 RX ORDER — GABAPENTIN 600 MG/1
600 TABLET ORAL ONCE
Status: COMPLETED | OUTPATIENT
Start: 2024-04-17 | End: 2024-04-17

## 2024-04-17 RX ORDER — TRANEXAMIC ACID 100 MG/ML
INJECTION, SOLUTION INTRAVENOUS AS NEEDED
Status: DISCONTINUED | OUTPATIENT
Start: 2024-04-17 | End: 2024-04-17

## 2024-04-17 RX ORDER — BUPIVACAINE HYDROCHLORIDE 5 MG/ML
INJECTION, SOLUTION EPIDURAL; INTRACAUDAL AS NEEDED
Status: DISCONTINUED | OUTPATIENT
Start: 2024-04-17 | End: 2024-04-17 | Stop reason: HOSPADM

## 2024-04-17 RX ORDER — FENTANYL CITRATE 50 UG/ML
INJECTION, SOLUTION INTRAMUSCULAR; INTRAVENOUS AS NEEDED
Status: DISCONTINUED | OUTPATIENT
Start: 2024-04-17 | End: 2024-04-17

## 2024-04-17 RX ORDER — FENTANYL CITRATE 50 UG/ML
25 INJECTION, SOLUTION INTRAMUSCULAR; INTRAVENOUS EVERY 5 MIN PRN
Status: DISCONTINUED | OUTPATIENT
Start: 2024-04-17 | End: 2024-04-17 | Stop reason: HOSPADM

## 2024-04-17 RX ORDER — CELECOXIB 200 MG/1
400 CAPSULE ORAL ONCE
Status: COMPLETED | OUTPATIENT
Start: 2024-04-17 | End: 2024-04-17

## 2024-04-17 RX ORDER — ONDANSETRON 4 MG/1
4 TABLET, ORALLY DISINTEGRATING ORAL ONCE
Status: COMPLETED | OUTPATIENT
Start: 2024-04-17 | End: 2024-04-17

## 2024-04-17 RX ORDER — FENTANYL CITRATE 50 UG/ML
50 INJECTION, SOLUTION INTRAMUSCULAR; INTRAVENOUS EVERY 5 MIN PRN
Status: DISCONTINUED | OUTPATIENT
Start: 2024-04-17 | End: 2024-04-17 | Stop reason: HOSPADM

## 2024-04-17 RX ORDER — DEXTROSE, SODIUM CHLORIDE, SODIUM LACTATE, POTASSIUM CHLORIDE, AND CALCIUM CHLORIDE 5; .6; .31; .03; .02 G/100ML; G/100ML; G/100ML; G/100ML; G/100ML
100 INJECTION, SOLUTION INTRAVENOUS CONTINUOUS
Status: DISCONTINUED | OUTPATIENT
Start: 2024-04-17 | End: 2024-04-17 | Stop reason: HOSPADM

## 2024-04-17 RX ORDER — SODIUM CHLORIDE, SODIUM LACTATE, POTASSIUM CHLORIDE, CALCIUM CHLORIDE 600; 310; 30; 20 MG/100ML; MG/100ML; MG/100ML; MG/100ML
100 INJECTION, SOLUTION INTRAVENOUS CONTINUOUS
Status: DISCONTINUED | OUTPATIENT
Start: 2024-04-17 | End: 2024-04-17 | Stop reason: HOSPADM

## 2024-04-17 RX ORDER — PROPOFOL 10 MG/ML
INJECTION, EMULSION INTRAVENOUS AS NEEDED
Status: DISCONTINUED | OUTPATIENT
Start: 2024-04-17 | End: 2024-04-17

## 2024-04-17 RX ORDER — VANCOMYCIN HYDROCHLORIDE 1 G/20ML
1 INJECTION, POWDER, LYOPHILIZED, FOR SOLUTION INTRAVENOUS ONCE
Status: DISCONTINUED | OUTPATIENT
Start: 2024-04-17 | End: 2024-04-17 | Stop reason: HOSPADM

## 2024-04-17 RX ORDER — DIPHENHYDRAMINE HYDROCHLORIDE 50 MG/ML
25 INJECTION INTRAMUSCULAR; INTRAVENOUS ONCE
Status: DISCONTINUED | OUTPATIENT
Start: 2024-04-17 | End: 2024-04-17 | Stop reason: HOSPADM

## 2024-04-17 RX ORDER — ACETAMINOPHEN 325 MG/1
650 TABLET ORAL ONCE
Status: COMPLETED | OUTPATIENT
Start: 2024-04-17 | End: 2024-04-17

## 2024-04-17 RX ORDER — OXYCODONE AND ACETAMINOPHEN 5; 325 MG/1; MG/1
1 TABLET ORAL AS NEEDED
Status: COMPLETED | OUTPATIENT
Start: 2024-04-17 | End: 2024-04-17

## 2024-04-17 RX ORDER — SCOLOPAMINE TRANSDERMAL SYSTEM 1 MG/1
1 PATCH, EXTENDED RELEASE TRANSDERMAL
Status: DISCONTINUED | OUTPATIENT
Start: 2024-04-17 | End: 2024-04-17 | Stop reason: HOSPADM

## 2024-04-17 RX ORDER — LIDOCAINE HYDROCHLORIDE AND EPINEPHRINE 10; 10 MG/ML; UG/ML
INJECTION, SOLUTION INFILTRATION; PERINEURAL AS NEEDED
Status: DISCONTINUED | OUTPATIENT
Start: 2024-04-17 | End: 2024-04-17 | Stop reason: HOSPADM

## 2024-04-17 RX ORDER — SODIUM CHLORIDE, SODIUM LACTATE, POTASSIUM CHLORIDE, CALCIUM CHLORIDE 600; 310; 30; 20 MG/100ML; MG/100ML; MG/100ML; MG/100ML
INJECTION, SOLUTION INTRAVENOUS CONTINUOUS PRN
Status: DISCONTINUED | OUTPATIENT
Start: 2024-04-17 | End: 2024-04-17

## 2024-04-17 RX ADMIN — ONDANSETRON 4 MG: 4 TABLET, ORALLY DISINTEGRATING ORAL at 13:43

## 2024-04-17 RX ADMIN — OXYCODONE AND ACETAMINOPHEN 1 TABLET: 5; 325 TABLET ORAL at 17:36

## 2024-04-17 RX ADMIN — CEFUROXIME SODIUM 1.5 G: 750 INJECTION, POWDER, FOR SOLUTION INTRAMUSCULAR; INTRAVENOUS at 16:11

## 2024-04-17 RX ADMIN — CELECOXIB 400 MG: 200 CAPSULE ORAL at 13:42

## 2024-04-17 RX ADMIN — DEXAMETHASONE SODIUM PHOSPHATE 8 MG: 4 INJECTION, SOLUTION INTRAMUSCULAR; INTRAVENOUS at 16:13

## 2024-04-17 RX ADMIN — MIDAZOLAM HYDROCHLORIDE 2 MG: 1 INJECTION, SOLUTION INTRAMUSCULAR; INTRAVENOUS at 16:00

## 2024-04-17 RX ADMIN — SCOPALAMINE 1 PATCH: 1 PATCH, EXTENDED RELEASE TRANSDERMAL at 14:10

## 2024-04-17 RX ADMIN — GABAPENTIN 600 MG: 300 CAPSULE ORAL at 13:43

## 2024-04-17 RX ADMIN — LIDOCAINE HYDROCHLORIDE 50 MG: 20 INJECTION, SOLUTION INFILTRATION; PERINEURAL at 16:07

## 2024-04-17 RX ADMIN — TRANEXAMIC ACID 1000 MG: 100 INJECTION, SOLUTION INTRAVENOUS at 16:11

## 2024-04-17 RX ADMIN — PROPOFOL 150 MG: 10 INJECTION, EMULSION INTRAVENOUS at 16:07

## 2024-04-17 RX ADMIN — ACETAMINOPHEN 650 MG: 325 TABLET ORAL at 13:43

## 2024-04-17 RX ADMIN — FENTANYL CITRATE 50 MCG: 0.05 INJECTION, SOLUTION INTRAMUSCULAR; INTRAVENOUS at 16:07

## 2024-04-17 RX ADMIN — SODIUM CHLORIDE, POTASSIUM CHLORIDE, SODIUM LACTATE AND CALCIUM CHLORIDE: 600; 310; 30; 20 INJECTION, SOLUTION INTRAVENOUS at 15:59

## 2024-04-17 SDOH — HEALTH STABILITY: MENTAL HEALTH: CURRENT SMOKER: 0

## 2024-04-17 ASSESSMENT — PAIN SCALES - GENERAL
PAINLEVEL_OUTOF10: 0 - NO PAIN
PAINLEVEL_OUTOF10: 0 - NO PAIN
PAIN_LEVEL: 0
PAINLEVEL_OUTOF10: 5 - MODERATE PAIN
PAINLEVEL_OUTOF10: 0 - NO PAIN
PAINLEVEL_OUTOF10: 5 - MODERATE PAIN

## 2024-04-17 ASSESSMENT — PAIN - FUNCTIONAL ASSESSMENT
PAIN_FUNCTIONAL_ASSESSMENT: 0-10

## 2024-04-17 ASSESSMENT — COLUMBIA-SUICIDE SEVERITY RATING SCALE - C-SSRS
1. IN THE PAST MONTH, HAVE YOU WISHED YOU WERE DEAD OR WISHED YOU COULD GO TO SLEEP AND NOT WAKE UP?: NO
2. HAVE YOU ACTUALLY HAD ANY THOUGHTS OF KILLING YOURSELF?: NO
6. HAVE YOU EVER DONE ANYTHING, STARTED TO DO ANYTHING, OR PREPARED TO DO ANYTHING TO END YOUR LIFE?: NO

## 2024-04-17 ASSESSMENT — PAIN DESCRIPTION - DESCRIPTORS: DESCRIPTORS: ACHING

## 2024-04-17 NOTE — OP NOTE
Revision of Reconstructed Breast / Resection Nipple, Scar Revision and Fat Necrosis Breast (L) Operative Note     Date: 2024  OR Location: [facility]    Name: Sabrina Michaud, : 1965, Age: 58 y.o., MRN: [id number], Sex: female    Diagnosis  Pre-op Diagnosis     * Personal history of breast cancer [Z85.3]     * Scar of female breast [L90.5] Post-op Diagnosis     * Personal history of breast cancer [Z85.3]     * Scar of female breast [L90.5]     Procedures  Revision of Reconstructed Breast / Resection Nipple, Scar Revision and Fat Necrosis Breast  78121 - PA REVISION OF RECONSTRUCTED BREAST    Resection of left chest wall nipple areolar complex and fat necrosis  10 x 5 cm  Modification of reconstructed breast left    Surgeons      * Tim Dorman - Primary    Resident/Fellow/Other Assistant:  Surgeons and Role:  * No surgeons found with a matching role *    Procedure Summary  Anesthesia: General  ASA: II  Anesthesia Staff: Anesthesiologist: Bettina Jeter MD MPH  CRNA: SNOW Ga-CRNA  Estimated Blood Loss: 10 mL  Intra-op Medications:   Administrations occurring from 1430 to 1630 on 24:   Medication Name Total Dose   lidocaine-epinephrine (Xylocaine W/EPI) 1 %-1:100,000 injection 5 mL   bupivacaine PF (Marcaine) 0.5 % (5 mg/mL) injection 5 mL   cefuroxime (Zinacef) 1.5 g in sodium chloride 0.9% 50 mL IV 1.5 g              Anesthesia Record               Intraprocedure I/O Totals          Intake    Tranexamic Acid 0.00 mL    The total shown is the total volume documented since Anesthesia Start was filed.    cefuroxime (Zinacef) 1.5 g in sodium chloride 0.9% 50 mL IV 50.00 mL    Total Intake 50 mL          Specimen:   ID Type Source Tests Collected by Time   1 : LEFT BREAST FAT NECROSIS Tissue SOFT TISSUE RESECTION SURGICAL PATHOLOGY EXAM Tim Dorman MD 2024 1633        Staff:   Circulator: Aníbal Brenner RN  Scrub Person: Lea Bruce         Drains and/or Catheters:    Closed/Suction Drain 1 Inferior;Left Breast Bulb 10 Fr. (Active)           Findings: Fat necrosis and nipple-areolar complex fibrosis    Indications: Sabrina Michaud is an 58 y.o. female who is having surgery for history of breast cancer/ scar of left breast s/p fat necrosis after [place] reconstruction following mastectomy and immediate prepectoral breast reconstruction.  The patient subsequently went on to have debridement surgeries and explantation but had persistent nodular fibrosis in her anterior left chest wall requiring excision and chest wall rearrangement to optimize her for future reconstruction with an intact tissue envelope.  The patient was seen in the preoperative area. The risks, benefits, complications, treatment options, non-operative alternatives, expected recovery and outcomes were discussed with the patient. The possibilities of reaction to medication, pulmonary aspiration, injury to surrounding structures, bleeding, recurrent infection, the need for additional procedures, failure to diagnose a condition, and creating a complication requiring transfusion or operation were discussed with the patient. The patient concurred with the proposed plan, giving informed consent.  The site of surgery was properly noted/marked if necessary per policy. The patient has been actively warmed in preoperative area. Preoperative antibiotics have been ordered and given within 1 hours of incision. Venous thrombosis prophylaxis have been ordered including bilateral sequential compression devices    Procedure Details:     The patient was identified in the holding area and marked in the upright position with a small transverse ellipse around her crenated nipple areolar complex and palpable subareolar fat necrosis adherent to her anterior chest wall.  Team huddle was performed in the holding area with Dr. Jeter and team.  She was taken to the operating room by Dr. Jeter and team and check-in was observed.   Anesthesia with LMA was initiated after antibiotic was administered on-call to the OR.  Flowtron's were on and functioning prior to the induction of anesthesia.  Her arms were extended on arm boards.  She was prepped and draped in usual fashion with Betasept from chin to mid abdomen and from table to table edge.  I met was observed.  She was infiltrated with 10 cc of 1% lidocaine with epinephrine 1-100,000 mixed one-to-one with 0.5 Marcaine plain.  Vasoconstriction was awaited 6 minutes.  The ellipse around the nipple areolar complex was created with 10 blade knife.  This was connected to the T portion of the Ricks pattern for her previous [place] reconstruction.  Subcutaneous viable fat was preserved by using loupe magnification and 10 blade knife hugging the area of fibrosis against the viable anterior skin flaps.  The circumferential fibrosis was thus exposed.  The interface of skin flaps and fat necrosis was dissected with Bovie coagulation, Metzenbaum scissors, headlight, 2.5 loupe magnification surgical assistant, and retractors.  The fibrosis was dissected apically and then circumferentially by dissecting it posteriorly from the pectoralis plane of the nonviable tissue.  Then circumferentially and inferiorly this was dissected including extension laterally to the serratus area.  The total dimension was 10 x 5 cm of fat necrosis in the subcutaneous plane.  The space was inspected and hemostasis was pristine.  10 French drain was brought out from the incision inferior laterally and secured at the skin with 3-0 nylon.  The dead space was closed with sequential figure-of eight 2-0 Vicryl sutures.  These were placed without strangulating the drain in situ.  Skin was coapted with figure-of-eight buried 3-0 Monocryl recreating a triple point.  The skin was closed additionally with interrupted buried 3-0 Monocryl finally running subcuticular 3-0 Monocryl completed the repair.  She was cleansed of Betasept and  blood.  The drain held suction.  Steri-Strips were applied to the anterior skin.  Kerlix gauze pad and surgical bra were placed.  She was awakened on gurney and transported in satisfactory condition of the postanesthesia care unit.  Blood loss was 10 cc.  The specimen was sent for permanent section.        Complications:  None; patient tolerated the procedure well.    Disposition: PACU - hemodynamically stable.  Condition: stable         Additional Details: Tissue sent for pathology, path pending    Attending Attestation:     Tim Dorman  Phone Number: [phone]

## 2024-04-17 NOTE — H&P (VIEW-ONLY)
History Of Present Illness  Sabrina Michaud is a 58 y.o. female presenting with fat necrosis of her left chest after post implant reconstruction fat necrosis and nipple areolar complex viability compromise.  She is here for staged reconstruction to first remove her fat necrosis and the remnant nipple areolar complex which is apparently shaped.  She will subsequently undergo with 1 or two-stage reconstruction with implants.  In the interval she has had a 2 amputation which has been removed a 2-year long nidus of infection.  This is now healed.     Past Medical History  Past Medical History:   Diagnosis Date    Anxiety     Breast CA (Multi)     Diabetes mellitus (Multi)     Fibroid     Hypertension     Neoplasm of unspecified behavior of bone, soft tissue, and skin 08/22/2016    Skin growth    PONV (postoperative nausea and vomiting)        Surgical History  Past Surgical History:   Procedure Laterality Date    BREAST SURGERY Bilateral     mastectomy with reconstruction    BREAST SURGERY Left     Debridement x 2    HYSTERECTOMY  05/31/2016    Hysterectomy    MASTECTOMY Bilateral     with repair    TOE AMPUTATION Right     TONSILLECTOMY  05/31/2016    Tonsillectomy        Social History  She reports that she has never smoked. She has never used smokeless tobacco. She reports that she does not currently use alcohol after a past usage of about 2.0 - 3.0 standard drinks of alcohol per week. She reports that she does not use drugs.    Family History  Family History   Problem Relation Name Age of Onset    Diabetes Mother      Cancer Father      Ovarian cancer Sister      Diabetes Sibling      Neuropathy Sibling      Cancer Other          Grandparent    Diabetes Other          Grandparent    Cancer Other          Aunt        Allergies  Other, Piperacillin-tazobactam, and Vancomycin    Review of Systems  No fever or chills.  Physical Exam  Constitutional:       Appearance: Normal appearance.      Comments: Pt is well   HENT:       Head: Normocephalic and atraumatic.      Nose: Nose normal.      Mouth/Throat:      Mouth: Mucous membranes are moist.   Eyes:      Extraocular Movements: Extraocular movements intact.      Conjunctiva/sclera: Conjunctivae normal.      Pupils: Pupils are equal, round, and reactive to light.   Cardiovascular:      Rate and Rhythm: Normal rate and regular rhythm.      Pulses: Normal pulses.      Heart sounds: Normal heart sounds.   Pulmonary:      Effort: Pulmonary effort is normal.      Breath sounds: Normal breath sounds.   Chest:   Breasts:     Oh Score is 5.      Breasts are asymmetrical.      Comments: Post mastectomy reconstruction is normal appearing  Abdominal:      General: Abdomen is flat. Bowel sounds are normal.      Palpations: Abdomen is soft.   Musculoskeletal:         General: Normal range of motion.      Cervical back: Normal range of motion and neck supple.   Skin:     General: Skin is warm.      Capillary Refill: Capillary refill takes less than 2 seconds.   Neurological:      General: No focal deficit present.   Psychiatric:         Mood and Affect: Mood normal.         Behavior: Behavior normal.     Left breast is without implant and has a central fat necrosis with distortion of the left nipple areolar complex.Done done there is no sign of hematoma seroma infection cellulitis peau d'orange or tissue necrosis.    Last Recorded Vitals  Blood pressure 168/70, pulse 79, temperature 36.9 °C (98.4 °F), temperature source Temporal, resp. rate 18, SpO2 98%.    Relevant Results    Scheduled medications  cefuroxime, 1.5 g, intravenous, Once  diphenhydrAMINE, 25 mg, intravenous, Once  scopolamine, 1 patch, transdermal, q72h  vancomycin, 1 g, intravenous, Once      Continuous medications  dextrose 5 % and lactated Ringer's, 100 mL/hr      PRN medications    Patient for outpatient necrosis and nipple-areolar complex resection as a staged preamble to undergo left implant reconstruction.            Assessment/Plan   Active Problems:  There are no active Hospital Problems.             I spent 22 minutes in the professional and overall care of this patient.      Tim Dorman MD

## 2024-04-17 NOTE — ANESTHESIA PREPROCEDURE EVALUATION
Patient: Sabrina Michaud    Procedure Information       Date/Time: 04/17/24 1430    Procedure: Revision of Reconstructed Breast / Resection Nipple, Scar Revision and Fat Necrosis Breast (Left) - *AOA*    Location: [facility] / [facility]    Surgeons: Tim Dorman MD            Relevant Problems   Anesthesia   (+) PONV (postoperative nausea and vomiting)      Cardiac   (+) Atrial tachycardia (CMS-HCC)   (+) Benign essential hypertension      Pulmonary (within normal limits)      Neuro   (+) Anxiety   (+) Depression      GI (within normal limits)      /Renal (within normal limits)      Liver (within normal limits)      Endocrine   (+) Class 1 obesity with body mass index (BMI) of 31.0 to 31.9 in adult   (+) Diabetes mellitus type 2 in obese      Hematology   (+) Anemia   (+) Coagulation defect, unspecified (Multi)      Musculoskeletal  R foot      HEENT (within normal limits)      ID   (+) Acute hematogenous osteomyelitis of right foot (Multi)   (+) Acute osteomyelitis of foot (Multi)   (+) Other acute osteomyelitis, unspecified ankle and foot (Multi)      GYN   (+) Malignant neoplasm of lower-inner quadrant of left female breast, unspecified estrogen receptor status (Multi)     Past Surgical History:   Procedure Laterality Date   • BREAST SURGERY Bilateral     mastectomy with reconstruction   • BREAST SURGERY Left     Debridement x 2   • HYSTERECTOMY  05/31/2016    Hysterectomy   • MASTECTOMY Bilateral     with repair   • TOE AMPUTATION Right    • TONSILLECTOMY  05/31/2016    Tonsillectomy       Clinical information reviewed:   Tobacco  Allergies  Meds   Med Hx  Surg Hx  OB Status  Fam Hx  Soc   Hx        NPO Detail:  NPO/Void Status  Carbohydrate Drink Given Prior to Surgery? : N  Date of Last Liquid: 04/17/24  Time of Last Liquid: 1000  Date of Last Solid: 04/16/24  Time of Last Solid: 2000  Last Intake Type: Clear fluids  Time of Last Void: 1322        Chemistry    Lab Results   Component Value  Date/Time     02/19/2024 1500    K 3.6 02/19/2024 1500    CL 96 (L) 02/19/2024 1500    CO2 21 02/19/2024 1500    BUN 13 02/19/2024 1500    CREATININE 0.69 02/19/2024 1500    Lab Results   Component Value Date/Time    CALCIUM 9.8 02/19/2024 1500    ALKPHOS 131 (H) 02/19/2024 1500    AST 29 02/19/2024 1500    ALT 21 02/19/2024 1500    BILITOT 1.4 (H) 02/19/2024 1500        Lab Results   Component Value Date    WBC 10.8 02/19/2024    HGB 14.0 02/19/2024    HCT 41.7 02/19/2024    MCV 94 02/19/2024     (L) 02/19/2024          Physical Exam    Airway  Mallampati: III  TM distance: >3 FB  Neck ROM: full     Cardiovascular - normal exam     Dental    Pulmonary - normal exam     Abdominal - normal exam         Anesthesia Plan    History of general anesthesia?: yes  History of complications of general anesthesia?: no    ASA 2     general     The patient is not a current smoker.  Patient was not previously instructed to abstain from smoking on day of procedure.  Patient did not smoke on day of procedure.  Education provided regarding risk of obstructive sleep apnea.  intravenous induction   Postoperative administration of opioids is intended.  Trial extubation is planned.  Anesthetic plan and risks discussed with patient and spouse.  Use of blood products discussed with patient and spouse who consented to blood products.    Plan discussed with CRNA and CAA.

## 2024-04-17 NOTE — ANESTHESIA POSTPROCEDURE EVALUATION
Patient: Sabrina Michaud    Procedure Summary       Date: 04/17/24 Room / Location: [facility]    Anesthesia Start: 1601 Anesthesia Stop: 1658    Procedure: Revision of Reconstructed Breast / Resection Nipple, Scar Revision and Fat Necrosis Breast (Left) Diagnosis:       Personal history of breast cancer      Scar of female breast      (history of breast cancer/ scar of left breast s/p fat necrosis)    Surgeons: Tim Dorman MD Responsible Provider: Bettina Jeter MD MPH    Anesthesia Type: general ASA Status: 2            Anesthesia Type: general    Vitals Value Taken Time   /71 04/17/24 1721   Temp 36.1 04/17/24 1722   Pulse 85 04/17/24 1722   Resp 15 04/17/24 1722   SpO2 97 % 04/17/24 1722   Vitals shown include unfiled device data.    Anesthesia Post Evaluation    Patient location during evaluation: PACU  Patient participation: complete - patient participated  Level of consciousness: awake and alert  Pain score: 0  Pain management: adequate  Multimodal analgesia pain management approach  Airway patency: patent  Two or more strategies used to mitigate risk of obstructive sleep apnea  Cardiovascular status: acceptable  Respiratory status: acceptable  Hydration status: acceptable  Postoperative Nausea and Vomiting: none      No notable events documented.

## 2024-04-17 NOTE — ANESTHESIA PROCEDURE NOTES
Airway  Date/Time: 4/17/2024 4:08 PM  Urgency: elective    Airway not difficult    Staffing  Performed: CRNA   Authorized by: Bettina Jeter MD MPH    Performed by: SNOW Ga-FLORA  Patient location during procedure: OR    Indications and Patient Condition  Indications for airway management: anesthesia  Spontaneous ventilation: present  Sedation level: deep  Preoxygenated: yes  Patient position: sniffing  MILS maintained throughout  Mask difficulty assessment: 0 - not attempted  Planned trial extubation    Final Airway Details  Final airway type: supraglottic airway      Successful airway: classic  Size 4     Number of attempts at approach: 1  Number of other approaches attempted: 0

## 2024-04-18 ASSESSMENT — PAIN SCALES - GENERAL: PAINLEVEL_OUTOF10: 4

## 2024-04-24 ENCOUNTER — ANESTHESIA (OUTPATIENT)
Dept: OPERATING ROOM | Facility: HOSPITAL | Age: 59
End: 2024-04-24
Payer: COMMERCIAL

## 2024-04-24 ENCOUNTER — PHARMACY VISIT (OUTPATIENT)
Dept: PHARMACY | Facility: CLINIC | Age: 59
End: 2024-04-24
Payer: COMMERCIAL

## 2024-04-24 ENCOUNTER — HOSPITAL ENCOUNTER (OUTPATIENT)
Facility: HOSPITAL | Age: 59
Setting detail: OUTPATIENT SURGERY
Discharge: HOME | End: 2024-04-24
Attending: SPECIALIST | Admitting: SPECIALIST
Payer: COMMERCIAL

## 2024-04-24 ENCOUNTER — ANESTHESIA EVENT (OUTPATIENT)
Dept: OPERATING ROOM | Facility: HOSPITAL | Age: 59
End: 2024-04-24
Payer: COMMERCIAL

## 2024-04-24 VITALS
RESPIRATION RATE: 16 BRPM | SYSTOLIC BLOOD PRESSURE: 154 MMHG | WEIGHT: 180 LBS | TEMPERATURE: 97 F | BODY MASS INDEX: 30.73 KG/M2 | HEART RATE: 81 BPM | OXYGEN SATURATION: 99 % | DIASTOLIC BLOOD PRESSURE: 71 MMHG | HEIGHT: 64 IN

## 2024-04-24 DIAGNOSIS — C50.312 MALIGNANT NEOPLASM OF LOWER-INNER QUADRANT OF LEFT FEMALE BREAST, UNSPECIFIED ESTROGEN RECEPTOR STATUS (MULTI): Primary | ICD-10-CM

## 2024-04-24 DIAGNOSIS — T14.8XXA HEMATOMA: ICD-10-CM

## 2024-04-24 LAB — GLUCOSE BLD MANUAL STRIP-MCNC: 98 MG/DL (ref 74–99)

## 2024-04-24 PROCEDURE — 2500000004 HC RX 250 GENERAL PHARMACY W/ HCPCS (ALT 636 FOR OP/ED): Performed by: SPECIALIST

## 2024-04-24 PROCEDURE — 2500000001 HC RX 250 WO HCPCS SELF ADMINISTERED DRUGS (ALT 637 FOR MEDICARE OP): Performed by: SPECIALIST

## 2024-04-24 PROCEDURE — 2500000004 HC RX 250 GENERAL PHARMACY W/ HCPCS (ALT 636 FOR OP/ED): Mod: JW | Performed by: SPECIALIST

## 2024-04-24 PROCEDURE — 3700000001 HC GENERAL ANESTHESIA TIME - INITIAL BASE CHARGE: Performed by: SPECIALIST

## 2024-04-24 PROCEDURE — 7100000002 HC RECOVERY ROOM TIME - EACH INCREMENTAL 1 MINUTE: Performed by: SPECIALIST

## 2024-04-24 PROCEDURE — 2500000005 HC RX 250 GENERAL PHARMACY W/O HCPCS: Performed by: SPECIALIST

## 2024-04-24 PROCEDURE — 7100000009 HC PHASE TWO TIME - INITIAL BASE CHARGE: Performed by: SPECIALIST

## 2024-04-24 PROCEDURE — 2500000004 HC RX 250 GENERAL PHARMACY W/ HCPCS (ALT 636 FOR OP/ED): Performed by: ANESTHESIOLOGY

## 2024-04-24 PROCEDURE — 2720000007 HC OR 272 NO HCPCS: Performed by: SPECIALIST

## 2024-04-24 PROCEDURE — 7100000001 HC RECOVERY ROOM TIME - INITIAL BASE CHARGE: Performed by: SPECIALIST

## 2024-04-24 PROCEDURE — RXMED WILLOW AMBULATORY MEDICATION CHARGE

## 2024-04-24 PROCEDURE — 3600000003 HC OR TIME - INITIAL BASE CHARGE - PROCEDURE LEVEL THREE: Performed by: SPECIALIST

## 2024-04-24 PROCEDURE — 10140 I&D HMTMA SEROMA/FLUID COLLJ: CPT | Performed by: PHYSICIAN ASSISTANT

## 2024-04-24 PROCEDURE — 82947 ASSAY GLUCOSE BLOOD QUANT: CPT

## 2024-04-24 PROCEDURE — 3700000002 HC GENERAL ANESTHESIA TIME - EACH INCREMENTAL 1 MINUTE: Performed by: SPECIALIST

## 2024-04-24 PROCEDURE — 2500000001 HC RX 250 WO HCPCS SELF ADMINISTERED DRUGS (ALT 637 FOR MEDICARE OP): Performed by: ANESTHESIOLOGY

## 2024-04-24 PROCEDURE — 2500000005 HC RX 250 GENERAL PHARMACY W/O HCPCS: Performed by: ANESTHESIOLOGY

## 2024-04-24 PROCEDURE — 3600000008 HC OR TIME - EACH INCREMENTAL 1 MINUTE - PROCEDURE LEVEL THREE: Performed by: SPECIALIST

## 2024-04-24 PROCEDURE — 7100000010 HC PHASE TWO TIME - EACH INCREMENTAL 1 MINUTE: Performed by: SPECIALIST

## 2024-04-24 RX ORDER — ONDANSETRON 4 MG/1
4 TABLET, ORALLY DISINTEGRATING ORAL ONCE
Status: COMPLETED | OUTPATIENT
Start: 2024-04-24 | End: 2024-04-24

## 2024-04-24 RX ORDER — SODIUM CHLORIDE, SODIUM LACTATE, POTASSIUM CHLORIDE, CALCIUM CHLORIDE 600; 310; 30; 20 MG/100ML; MG/100ML; MG/100ML; MG/100ML
INJECTION, SOLUTION INTRAVENOUS CONTINUOUS PRN
Status: DISCONTINUED | OUTPATIENT
Start: 2024-04-24 | End: 2024-04-24

## 2024-04-24 RX ORDER — BUPIVACAINE HYDROCHLORIDE 5 MG/ML
INJECTION, SOLUTION EPIDURAL; INTRACAUDAL AS NEEDED
Status: DISCONTINUED | OUTPATIENT
Start: 2024-04-24 | End: 2024-04-24 | Stop reason: HOSPADM

## 2024-04-24 RX ORDER — CELECOXIB 200 MG/1
400 CAPSULE ORAL ONCE
Status: DISCONTINUED | OUTPATIENT
Start: 2024-04-24 | End: 2024-04-24 | Stop reason: HOSPADM

## 2024-04-24 RX ORDER — KETOROLAC TROMETHAMINE 30 MG/ML
15 INJECTION, SOLUTION INTRAMUSCULAR; INTRAVENOUS ONCE AS NEEDED
Status: DISCONTINUED | OUTPATIENT
Start: 2024-04-24 | End: 2024-04-24 | Stop reason: HOSPADM

## 2024-04-24 RX ORDER — METOCLOPRAMIDE 10 MG/1
10 TABLET ORAL ONCE
Status: COMPLETED | OUTPATIENT
Start: 2024-04-24 | End: 2024-04-24

## 2024-04-24 RX ORDER — CEFUROXIME SODIUM 1.5 G/16ML
1500 INJECTION, POWDER, FOR SOLUTION INTRAVENOUS EVERY 8 HOURS
Status: DISCONTINUED | OUTPATIENT
Start: 2024-04-24 | End: 2024-04-24

## 2024-04-24 RX ORDER — GABAPENTIN 600 MG/1
600 TABLET ORAL ONCE
Status: COMPLETED | OUTPATIENT
Start: 2024-04-24 | End: 2024-04-24

## 2024-04-24 RX ORDER — ONDANSETRON HYDROCHLORIDE 2 MG/ML
INJECTION, SOLUTION INTRAVENOUS AS NEEDED
Status: DISCONTINUED | OUTPATIENT
Start: 2024-04-24 | End: 2024-04-24

## 2024-04-24 RX ORDER — FAMOTIDINE 20 MG/1
20 TABLET, FILM COATED ORAL ONCE
Status: COMPLETED | OUTPATIENT
Start: 2024-04-24 | End: 2024-04-24

## 2024-04-24 RX ORDER — DIPHENHYDRAMINE HYDROCHLORIDE 50 MG/ML
12.5 INJECTION INTRAMUSCULAR; INTRAVENOUS ONCE AS NEEDED
Status: DISCONTINUED | OUTPATIENT
Start: 2024-04-24 | End: 2024-04-24 | Stop reason: HOSPADM

## 2024-04-24 RX ORDER — LIDOCAINE HYDROCHLORIDE 20 MG/ML
INJECTION, SOLUTION INFILTRATION; PERINEURAL AS NEEDED
Status: DISCONTINUED | OUTPATIENT
Start: 2024-04-24 | End: 2024-04-24

## 2024-04-24 RX ORDER — OXYCODONE HCL 10 MG/1
10 TABLET, FILM COATED, EXTENDED RELEASE ORAL ONCE AS NEEDED
Status: CANCELLED | OUTPATIENT
Start: 2024-04-24

## 2024-04-24 RX ORDER — ONDANSETRON 4 MG/1
4 TABLET, ORALLY DISINTEGRATING ORAL ONCE
Status: DISCONTINUED | OUTPATIENT
Start: 2024-04-24 | End: 2024-04-24 | Stop reason: HOSPADM

## 2024-04-24 RX ORDER — MIDAZOLAM HYDROCHLORIDE 1 MG/ML
INJECTION, SOLUTION INTRAMUSCULAR; INTRAVENOUS AS NEEDED
Status: DISCONTINUED | OUTPATIENT
Start: 2024-04-24 | End: 2024-04-24

## 2024-04-24 RX ORDER — HYDRALAZINE HYDROCHLORIDE 20 MG/ML
10 INJECTION INTRAMUSCULAR; INTRAVENOUS EVERY 30 MIN PRN
Status: DISCONTINUED | OUTPATIENT
Start: 2024-04-24 | End: 2024-04-24 | Stop reason: HOSPADM

## 2024-04-24 RX ORDER — CELECOXIB 200 MG/1
400 CAPSULE ORAL ONCE
Status: COMPLETED | OUTPATIENT
Start: 2024-04-24 | End: 2024-04-24

## 2024-04-24 RX ORDER — PROPOFOL 10 MG/ML
INJECTION, EMULSION INTRAVENOUS AS NEEDED
Status: DISCONTINUED | OUTPATIENT
Start: 2024-04-24 | End: 2024-04-24

## 2024-04-24 RX ORDER — LABETALOL HYDROCHLORIDE 5 MG/ML
10 INJECTION, SOLUTION INTRAVENOUS ONCE AS NEEDED
Status: DISCONTINUED | OUTPATIENT
Start: 2024-04-24 | End: 2024-04-24 | Stop reason: HOSPADM

## 2024-04-24 RX ORDER — ACETAMINOPHEN 325 MG/1
650 TABLET ORAL ONCE
Status: COMPLETED | OUTPATIENT
Start: 2024-04-24 | End: 2024-04-24

## 2024-04-24 RX ORDER — TRANEXAMIC ACID 100 MG/ML
INJECTION, SOLUTION INTRAVENOUS AS NEEDED
Status: DISCONTINUED | OUTPATIENT
Start: 2024-04-24 | End: 2024-04-24

## 2024-04-24 RX ORDER — DEXTROSE, SODIUM CHLORIDE, SODIUM LACTATE, POTASSIUM CHLORIDE, AND CALCIUM CHLORIDE 5; .6; .31; .03; .02 G/100ML; G/100ML; G/100ML; G/100ML; G/100ML
100 INJECTION, SOLUTION INTRAVENOUS CONTINUOUS
Status: DISCONTINUED | OUTPATIENT
Start: 2024-04-24 | End: 2024-04-24

## 2024-04-24 RX ORDER — ACETAMINOPHEN 325 MG/1
650 TABLET ORAL ONCE
Status: DISCONTINUED | OUTPATIENT
Start: 2024-04-24 | End: 2024-04-24 | Stop reason: HOSPADM

## 2024-04-24 RX ORDER — ACETAMINOPHEN 325 MG/1
650 TABLET ORAL EVERY 6 HOURS PRN
Qty: 20 TABLET | Refills: 0 | Status: SHIPPED | OUTPATIENT
Start: 2024-04-24

## 2024-04-24 RX ORDER — DEXTROSE, SODIUM CHLORIDE, SODIUM LACTATE, POTASSIUM CHLORIDE, AND CALCIUM CHLORIDE 5; .6; .31; .03; .02 G/100ML; G/100ML; G/100ML; G/100ML; G/100ML
100 INJECTION, SOLUTION INTRAVENOUS CONTINUOUS
Status: DISCONTINUED | OUTPATIENT
Start: 2024-04-24 | End: 2024-04-24 | Stop reason: HOSPADM

## 2024-04-24 RX ORDER — GABAPENTIN 600 MG/1
600 TABLET ORAL ONCE
Status: DISCONTINUED | OUTPATIENT
Start: 2024-04-24 | End: 2024-04-24 | Stop reason: HOSPADM

## 2024-04-24 RX ORDER — ALBUTEROL SULFATE 0.83 MG/ML
2.5 SOLUTION RESPIRATORY (INHALATION) ONCE AS NEEDED
Status: DISCONTINUED | OUTPATIENT
Start: 2024-04-24 | End: 2024-04-24 | Stop reason: HOSPADM

## 2024-04-24 RX ORDER — ALBUTEROL SULFATE 0.83 MG/ML
2.5 SOLUTION RESPIRATORY (INHALATION) ONCE
Status: DISCONTINUED | OUTPATIENT
Start: 2024-04-24 | End: 2024-04-24 | Stop reason: HOSPADM

## 2024-04-24 RX ORDER — FENTANYL CITRATE 50 UG/ML
INJECTION, SOLUTION INTRAMUSCULAR; INTRAVENOUS AS NEEDED
Status: DISCONTINUED | OUTPATIENT
Start: 2024-04-24 | End: 2024-04-24

## 2024-04-24 RX ORDER — LIDOCAINE HYDROCHLORIDE AND EPINEPHRINE 10; 10 MG/ML; UG/ML
INJECTION, SOLUTION INFILTRATION; PERINEURAL AS NEEDED
Status: DISCONTINUED | OUTPATIENT
Start: 2024-04-24 | End: 2024-04-24 | Stop reason: HOSPADM

## 2024-04-24 RX ORDER — ONDANSETRON 4 MG/1
4 TABLET, FILM COATED ORAL EVERY 6 HOURS PRN
Qty: 20 TABLET | Refills: 0 | Status: SHIPPED | OUTPATIENT
Start: 2024-04-24

## 2024-04-24 RX ORDER — ONDANSETRON HYDROCHLORIDE 2 MG/ML
4 INJECTION, SOLUTION INTRAVENOUS ONCE AS NEEDED
Status: DISCONTINUED | OUTPATIENT
Start: 2024-04-24 | End: 2024-04-24 | Stop reason: HOSPADM

## 2024-04-24 RX ORDER — CEFAZOLIN SODIUM 2 G/100ML
2 INJECTION, SOLUTION INTRAVENOUS ONCE
Status: DISCONTINUED | OUTPATIENT
Start: 2024-04-24 | End: 2024-04-24

## 2024-04-24 RX ADMIN — FENTANYL CITRATE 50 MCG: 0.05 INJECTION, SOLUTION INTRAMUSCULAR; INTRAVENOUS at 14:16

## 2024-04-24 RX ADMIN — FAMOTIDINE 20 MG: 20 TABLET ORAL at 12:18

## 2024-04-24 RX ADMIN — LIDOCAINE HYDROCHLORIDE 50 MG: 20 INJECTION, SOLUTION INFILTRATION; PERINEURAL at 14:07

## 2024-04-24 RX ADMIN — CEFUROXIME SODIUM 1.5 G: 750 INJECTION, POWDER, FOR SOLUTION INTRAMUSCULAR; INTRAVENOUS at 14:16

## 2024-04-24 RX ADMIN — ACETAMINOPHEN 650 MG: 325 TABLET ORAL at 12:18

## 2024-04-24 RX ADMIN — PROPOFOL 30 MG: 10 INJECTION, EMULSION INTRAVENOUS at 14:19

## 2024-04-24 RX ADMIN — ONDANSETRON 4 MG: 4 TABLET, ORALLY DISINTEGRATING ORAL at 12:18

## 2024-04-24 RX ADMIN — METOCLOPRAMIDE 10 MG: 10 TABLET ORAL at 12:18

## 2024-04-24 RX ADMIN — HYDROMORPHONE HYDROCHLORIDE 0.5 MG: 2 INJECTION, SOLUTION INTRAMUSCULAR; INTRAVENOUS; SUBCUTANEOUS at 14:53

## 2024-04-24 RX ADMIN — TRANEXAMIC ACID 1000 MG: 100 INJECTION, SOLUTION INTRAVENOUS at 14:12

## 2024-04-24 RX ADMIN — HYDROMORPHONE HYDROCHLORIDE 0.5 MG: 2 INJECTION, SOLUTION INTRAMUSCULAR; INTRAVENOUS; SUBCUTANEOUS at 15:14

## 2024-04-24 RX ADMIN — PROPOFOL 20 MG: 10 INJECTION, EMULSION INTRAVENOUS at 14:35

## 2024-04-24 RX ADMIN — GABAPENTIN 600 MG: 300 CAPSULE ORAL at 12:18

## 2024-04-24 RX ADMIN — ONDANSETRON HYDROCHLORIDE 4 MG: 2 INJECTION INTRAMUSCULAR; INTRAVENOUS at 14:28

## 2024-04-24 RX ADMIN — PROPOFOL 150 MG: 10 INJECTION, EMULSION INTRAVENOUS at 14:07

## 2024-04-24 RX ADMIN — FENTANYL CITRATE 50 MCG: 0.05 INJECTION, SOLUTION INTRAMUSCULAR; INTRAVENOUS at 14:01

## 2024-04-24 RX ADMIN — SODIUM CHLORIDE, POTASSIUM CHLORIDE, SODIUM LACTATE AND CALCIUM CHLORIDE: 600; 310; 30; 20 INJECTION, SOLUTION INTRAVENOUS at 13:45

## 2024-04-24 RX ADMIN — MIDAZOLAM HYDROCHLORIDE 2 MG: 1 INJECTION, SOLUTION INTRAMUSCULAR; INTRAVENOUS at 14:01

## 2024-04-24 RX ADMIN — CELECOXIB 400 MG: 200 CAPSULE ORAL at 12:18

## 2024-04-24 ASSESSMENT — PAIN - FUNCTIONAL ASSESSMENT
PAIN_FUNCTIONAL_ASSESSMENT: 0-10

## 2024-04-24 ASSESSMENT — PAIN SCALES - GENERAL
PAINLEVEL_OUTOF10: 4
PAINLEVEL_OUTOF10: 8
PAINLEVEL_OUTOF10: 7
PAINLEVEL_OUTOF10: 5 - MODERATE PAIN
PAIN_LEVEL: 5
PAINLEVEL_OUTOF10: 4
PAINLEVEL_OUTOF10: 4
PAINLEVEL_OUTOF10: 3

## 2024-04-24 ASSESSMENT — PAIN DESCRIPTION - LOCATION
LOCATION: BREAST
LOCATION: BREAST

## 2024-04-24 ASSESSMENT — PAIN DESCRIPTION - ORIENTATION: ORIENTATION: LEFT

## 2024-04-24 NOTE — ANESTHESIA PREPROCEDURE EVALUATION
Patient: Sabrina Michaud    Procedure Information       Date/Time: 04/24/24 1340    Procedure: Evacuation Hematoma Chest (Left) - *AOA - update*    Location: [facility]    Surgeons: Tim Dorman MD            Relevant Problems   Anesthesia   (+) PONV (postoperative nausea and vomiting)      Cardiac   (+) Atrial tachycardia (CMS-HCC)   (+) Benign essential hypertension      Neuro   (+) Anxiety   (+) Depression      Endocrine   (+) Class 1 obesity with body mass index (BMI) of 31.0 to 31.9 in adult   (+) Diabetes mellitus type 2 in obese      Hematology   (+) Anemia   (+) Coagulation defect, unspecified (Multi)      ID   (+) Acute hematogenous osteomyelitis of right foot (Multi)   (+) Acute osteomyelitis of foot (Multi)   (+) Other acute osteomyelitis, unspecified ankle and foot (Multi)      GYN   (+) Malignant neoplasm of lower-inner quadrant of left female breast, unspecified estrogen receptor status (Multi)       Clinical information reviewed:   Tobacco  Allergies  Meds   Med Hx  Surg Hx  OB Status  Fam Hx  Soc   Hx        NPO Detail:  NPO/Void Status  Date of Last Liquid: 04/24/24  Time of Last Liquid: 1000  Date of Last Solid: 04/23/24  Time of Last Solid: 2300  Last Intake Type: Clear fluids         Physical Exam    Airway  Mallampati: II     Cardiovascular   Rhythm: regular  Rate: normal     Dental - normal exam     Pulmonary   Breath sounds clear to auscultation     Abdominal   Abdomen: soft             Anesthesia Plan    History of general anesthesia?: yes  History of complications of general anesthesia?: no    ASA 3     general     intravenous induction   Postoperative administration of opioids is intended.  Anesthetic plan and risks discussed with patient.    Plan discussed with CRNA, attending and CAA.

## 2024-04-24 NOTE — INTERVAL H&P NOTE
H&P reviewed. The patient was examined and there are no changes to the H&P.The patient in the interval since surgery has developed a Left breast hematoma. Options were reviewed and the most prudent course decision was made to evacuate the hematomo, which will be performed today as an outpatient.

## 2024-04-24 NOTE — POST-PROCEDURE NOTE
1548- pt brought back from pacu,verbal report received. Pt is alert and awake.  at bedside.snack and drink given. Rx to go meds already with family.     1615- vss. Discharge instructions given and explained to pt and . Both verbally understood. Pt tolerating snack and drink. Darek drain measurement sheet and urine cups for emptying given for home.     1619- pt getting dressed at this time with assistance of .    1623- pt ambulated to BR with steady gait and assistance of .    1627- transport at bedside.

## 2024-04-24 NOTE — ANESTHESIA POSTPROCEDURE EVALUATION
Patient: Sabrina Michaud    Procedure Summary       Date: 04/24/24 Room / Location: [facility]    Anesthesia Start: 1401 Anesthesia Stop: 1450    Procedure: Evacuation Hematoma Chest (Left) Diagnosis:       Hematoma      (hematoma left  chest)    Surgeons: Tim Dorman MD Responsible Provider: Walker Burr MD    Anesthesia Type: general ASA Status: 3            Anesthesia Type: general    Vitals Value Taken Time   /74 04/24/24 1452   Temp 36 04/24/24 1452   Pulse 77 04/24/24 1452   Resp 14 04/24/24 1452   SpO2 97 04/24/24 1452       Anesthesia Post Evaluation    Patient location during evaluation: PACU  Patient participation: complete - patient participated  Level of consciousness: awake and alert  Pain score: 5  Pain management: adequate  Multimodal analgesia pain management approach  Airway patency: patent  Two or more strategies used to mitigate risk of obstructive sleep apnea  Cardiovascular status: acceptable and blood pressure returned to baseline  Respiratory status: acceptable  Hydration status: acceptable  Postoperative Nausea and Vomiting: none        There were no known notable events for this encounter.

## 2024-04-24 NOTE — ANESTHESIA PROCEDURE NOTES
Airway  Date/Time: 4/24/2024 2:08 PM  Urgency: elective    Airway not difficult    Staffing  Performed: attending   Authorized by: Walker Burr MD    Performed by: Walker Burr MD  Patient location during procedure: OR    Indications and Patient Condition  Indications for airway management: anesthesia and airway protection  Spontaneous ventilation: present  Sedation level: deep  Preoxygenated: yes  Patient position: sniffing  MILS maintained throughout  Mask difficulty assessment: 1 - vent by mask  Planned trial extubation    Final Airway Details  Final airway type: supraglottic airway      Successful airway: unique  Size 4     Number of attempts at approach: 1  Ventilation between attempts: none  Number of other approaches attempted: 0

## 2024-04-24 NOTE — OP NOTE
Evacuation Hematoma Chest (L) Operative Note     Date: 2024  OR Location: [facility]    Name: Sabrina Michaud, : 1965, Age: 58 y.o., MRN: [id number], Sex: female    Diagnosis  Pre-op Diagnosis     * Hematoma [T14.8XXA] Post-op Diagnosis     * Hematoma [T14.8XXA]     Procedures  Evacuation Hematoma Chest  67036 - VT I&D HEMATOMA SEROMA/FLUID COLLECTION    Evacuation left breast hematoma  Intermediate layered closure left chest 4.5 cm    Surgeons      * Tim Dorman - Primary    Resident/Fellow/Other Assistant:  Surgeons and Role:  * No surgeons found with a matching role *    Procedure Summary  Anesthesia: Consult  ASA: III  Anesthesia Staff: Anesthesiologist: Walker Burr MD  Estimated Blood Loss: 10mL  Intra-op Medications:   Administrations occurring from 1340 to 1510 on 24:   Medication Name Total Dose   lidocaine-epinephrine (Xylocaine W/EPI) 1 %-1:100,000 injection 5 mL   bupivacaine PF (Marcaine) 0.5 % (5 mg/mL) injection 5 mL   cefuroxime (Zinacef) 1.5 g in dextrose 5 % in water (D5W) 50 mL IV 1.5 g              Anesthesia Record               Intraprocedure I/O Totals          Intake    Tranexamic Acid 0.00 mL    The total shown is the total volume documented since Anesthesia Start was filed.    cefuroxime (Zinacef) 1.5 g in dextrose 5 % in water (D5W) 50 mL IV 50.00 mL    Total Intake 50 mL          Specimen: No specimens collected     Staff:   Circulator: Aníbal Brenner RN  Scrub Person: Lea Bruce         Drains and/or Catheters:   Closed/Suction Drain 1 Inferior;Left Breast Bulb (Active)       Tourniquet Times:         Implants:     Findings: 300 cc old clot no discrete bleeding site    Indications: Sabrina Michaud is an 58 y.o. female who is having surgery for hematoma left  chest.     The patient was seen in the preoperative area. The risks, benefits, complications, treatment options, non-operative alternatives, expected recovery and outcomes were discussed with the  patient. The possibilities of reaction to medication, pulmonary aspiration, injury to surrounding structures, bleeding, recurrent infection, the need for additional procedures, failure to diagnose a condition, and creating a complication requiring transfusion or operation were discussed with the patient. The patient concurred with the proposed plan, giving informed consent.  The site of surgery was properly noted/marked if necessary per policy. The patient has been actively warmed in preoperative area. Preoperative antibiotics have been ordered and given within 1 hours of incision. Venous thrombosis prophylaxis have been ordered including bilateral sequential compression devices    Procedure Details: The patient was identified in the holding area and marked.  Her risks were reviewed.  She was taken to the operating room after team huddle was performed.  Check-in was observed.  Flowtron's were on and functioning prior to induction of anesthesia.  Antibiotic was administered on-call to the OR.  After general anesthesia was established by Dr. Day she was prepped and draped in usual fashion with Betasept.  10 cc of 1% lidocaine with epinephrine 1 100,000 mixed 1-1.5 Marcaine plain was administered subcutaneously in the incision site.  The constriction was awaited.  The incision was opened with 15 blade knife loupe magnification surgical headlight surgical assistant.  Old clot was suctioned carefully.  The wound was explored and the space was suctioned revealing no discrete side of bleeding source.  The space was then aggressively irrigated with saline Akilah syringe and pressure syringe eliminating all clot and residual.  The space was inspected and had very small amount of bipolar cautery where there was punctate hemorrhage.  The space was observed a few minutes.  There was no collection.  The drain which was placed in situ was removed after the prep.  Another drain was placed from a slightly remote site in the  inframammary fold and secured to the skin with 3-0 nylon.  10 Maltese drain was held bulb suction at the end of the procedure.  The dead space was closed with figure-of-eight 2-0 Vicryl suture the triple point site was closed with figure-of-eight 2-0 Vicryl suture.  The skin was closed with intermediate layered closure using interrupted buried 3-0 Monocryl and the Jarek's fascia interrupted buried 3-0 Monocryl in the skin and running subcuticular 3-0 Monocryl completing the repair.  The patient was cleansed with Betasept and blood.  Operative blood loss was less than 10 cc.  Kerlix gauze pad was placed after Steri-Strips were applied.  A surgical bra was applied.  She was awakened extubated and transported in satisfactory condition to postanesthesia care.  There was no specimen  Complications:  None; patient tolerated the procedure well.    Disposition: PACU - hemodynamically stable.  Condition: stable         Additional Details:   Attending Attestation:     Tim Dorman  Phone Number: [phone]

## 2024-06-11 ENCOUNTER — TELEPHONE (OUTPATIENT)
Dept: HEMATOLOGY/ONCOLOGY | Facility: CLINIC | Age: 59
End: 2024-06-11
Payer: COMMERCIAL

## 2024-06-12 DIAGNOSIS — Z85.3 HISTORY OF BREAST CANCER: ICD-10-CM

## 2024-06-12 RX ORDER — ANASTROZOLE 1 MG/1
1 TABLET ORAL DAILY
Qty: 90 TABLET | Refills: 0 | Status: SHIPPED | OUTPATIENT
Start: 2024-06-12

## 2024-06-19 ENCOUNTER — OFFICE VISIT (OUTPATIENT)
Dept: WOUND CARE | Facility: HOSPITAL | Age: 59
End: 2024-06-19
Payer: COMMERCIAL

## 2024-06-19 ENCOUNTER — HOSPITAL ENCOUNTER (OUTPATIENT)
Dept: RADIOLOGY | Facility: HOSPITAL | Age: 59
Discharge: HOME | End: 2024-06-19
Payer: COMMERCIAL

## 2024-06-19 DIAGNOSIS — E11.621 DIABETIC ULCER OF TOE OF RIGHT FOOT ASSOCIATED WITH TYPE 2 DIABETES MELLITUS, LIMITED TO BREAKDOWN OF SKIN (MULTI): ICD-10-CM

## 2024-06-19 DIAGNOSIS — Z89.411 ACQUIRED ABSENCE OF RIGHT GREAT TOE (MULTI): Primary | ICD-10-CM

## 2024-06-19 DIAGNOSIS — L97.511 DIABETIC ULCER OF TOE OF RIGHT FOOT ASSOCIATED WITH TYPE 2 DIABETES MELLITUS, LIMITED TO BREAKDOWN OF SKIN (MULTI): ICD-10-CM

## 2024-06-19 DIAGNOSIS — Z89.411 ACQUIRED ABSENCE OF RIGHT GREAT TOE (MULTI): ICD-10-CM

## 2024-06-19 PROCEDURE — 99213 OFFICE O/P EST LOW 20 MIN: CPT

## 2024-06-19 PROCEDURE — 73630 X-RAY EXAM OF FOOT: CPT | Mod: RT

## 2024-07-09 DIAGNOSIS — M86.179 OTHER ACUTE OSTEOMYELITIS, UNSPECIFIED ANKLE AND FOOT (MULTI): ICD-10-CM

## 2024-07-09 RX ORDER — GABAPENTIN 400 MG/1
CAPSULE ORAL
Qty: 270 CAPSULE | Refills: 0 | Status: SHIPPED | OUTPATIENT
Start: 2024-07-09

## 2024-07-11 DIAGNOSIS — E11.69 TYPE 2 DIABETES MELLITUS WITH OBESITY (MULTI): ICD-10-CM

## 2024-07-11 DIAGNOSIS — E66.9 TYPE 2 DIABETES MELLITUS WITH OBESITY (MULTI): ICD-10-CM

## 2024-07-12 RX ORDER — SITAGLIPTIN AND METFORMIN HYDROCHLORIDE 500; 50 MG/1; MG/1
TABLET, FILM COATED ORAL
Qty: 180 TABLET | Refills: 1 | Status: SHIPPED | OUTPATIENT
Start: 2024-07-12

## 2024-07-30 ENCOUNTER — PRE-ADMISSION TESTING (OUTPATIENT)
Dept: PREADMISSION TESTING | Facility: HOSPITAL | Age: 59
End: 2024-07-30
Payer: COMMERCIAL

## 2024-07-30 VITALS
WEIGHT: 181 LBS | TEMPERATURE: 98.2 F | BODY MASS INDEX: 30.9 KG/M2 | OXYGEN SATURATION: 100 % | HEIGHT: 64 IN | SYSTOLIC BLOOD PRESSURE: 150 MMHG | RESPIRATION RATE: 16 BRPM | HEART RATE: 92 BPM | DIASTOLIC BLOOD PRESSURE: 79 MMHG

## 2024-07-30 DIAGNOSIS — Z01.818 PREOP TESTING: Primary | ICD-10-CM

## 2024-07-30 LAB
ALBUMIN SERPL-MCNC: 4.5 G/DL (ref 3.5–5)
ALP BLD-CCNC: 167 U/L (ref 35–125)
ALT SERPL-CCNC: 29 U/L (ref 5–40)
ANION GAP SERPL CALC-SCNC: 12 MMOL/L
AST SERPL-CCNC: 47 U/L (ref 5–40)
BILIRUB SERPL-MCNC: 0.9 MG/DL (ref 0.1–1.2)
BUN SERPL-MCNC: 10 MG/DL (ref 8–25)
CALCIUM SERPL-MCNC: 9.6 MG/DL (ref 8.5–10.4)
CHLORIDE SERPL-SCNC: 103 MMOL/L (ref 97–107)
CO2 SERPL-SCNC: 26 MMOL/L (ref 24–31)
CREAT SERPL-MCNC: 0.6 MG/DL (ref 0.4–1.6)
EGFRCR SERPLBLD CKD-EPI 2021: >90 ML/MIN/1.73M*2
ERYTHROCYTE [DISTWIDTH] IN BLOOD BY AUTOMATED COUNT: 14.1 % (ref 11.5–14.5)
GLUCOSE SERPL-MCNC: 108 MG/DL (ref 65–99)
HCT VFR BLD AUTO: 38.9 % (ref 36–46)
HGB BLD-MCNC: 13.1 G/DL (ref 12–16)
MCH RBC QN AUTO: 32.8 PG (ref 26–34)
MCHC RBC AUTO-ENTMCNC: 33.7 G/DL (ref 32–36)
MCV RBC AUTO: 98 FL (ref 80–100)
NRBC BLD-RTO: 0 /100 WBCS (ref 0–0)
PLATELET # BLD AUTO: 126 X10*3/UL (ref 150–450)
POTASSIUM SERPL-SCNC: 4 MMOL/L (ref 3.4–5.1)
PROT SERPL-MCNC: 8 G/DL (ref 5.9–7.9)
RBC # BLD AUTO: 3.99 X10*6/UL (ref 4–5.2)
SODIUM SERPL-SCNC: 141 MMOL/L (ref 133–145)
WBC # BLD AUTO: 6.2 X10*3/UL (ref 4.4–11.3)

## 2024-07-30 PROCEDURE — 87081 CULTURE SCREEN ONLY: CPT | Mod: TRILAB,WESLAB

## 2024-07-30 PROCEDURE — 93005 ELECTROCARDIOGRAM TRACING: CPT

## 2024-07-30 PROCEDURE — 93010 ELECTROCARDIOGRAM REPORT: CPT | Performed by: INTERNAL MEDICINE

## 2024-07-30 PROCEDURE — 80053 COMPREHEN METABOLIC PANEL: CPT

## 2024-07-30 PROCEDURE — 36415 COLL VENOUS BLD VENIPUNCTURE: CPT

## 2024-07-30 PROCEDURE — 85027 COMPLETE CBC AUTOMATED: CPT

## 2024-07-30 RX ORDER — CHLORHEXIDINE GLUCONATE ORAL RINSE 1.2 MG/ML
SOLUTION DENTAL
Qty: 473 ML | Refills: 0 | Status: SHIPPED | OUTPATIENT
Start: 2024-08-13 | End: 2024-08-14

## 2024-07-30 ASSESSMENT — ENCOUNTER SYMPTOMS
NAUSEA: 0
COUGH: 0
HEMATOLOGIC/LYMPHATIC NEGATIVE: 1
ENDOCRINE NEGATIVE: 1
MYALGIAS: 0
WHEEZING: 0
EYES NEGATIVE: 1
CONSTIPATION: 0
FATIGUE: 0
DIARRHEA: 0
FEVER: 0
ARTHRALGIAS: 0
NUMBNESS: 1
VOMITING: 0
SHORTNESS OF BREATH: 0
ALLERGIC/IMMUNOLOGIC NEGATIVE: 1

## 2024-07-30 ASSESSMENT — DUKE ACTIVITY SCORE INDEX (DASI)
CAN YOU PARTICIPATE IN MODERATE RECREATIONAL ACTIVITIES LIKE GOLF, BOWLING, DANCING, DOUBLES TENNIS OR THROWING A BASEBALL OR FOOTBALL: NO
TOTAL_SCORE: 36.7
DASI METS SCORE: 7.3
CAN YOU HAVE SEXUAL RELATIONS: YES
CAN YOU RUN A SHORT DISTANCE: NO
CAN YOU WALK A BLOCK OR TWO ON LEVEL GROUND: YES
CAN YOU PARTICIPATE IN STRENOUS SPORTS LIKE SWIMMING, SINGLES TENNIS, FOOTBALL, BASKETBALL, OR SKIING: NO
CAN YOU DO LIGHT WORK AROUND THE HOUSE LIKE DUSTING OR WASHING DISHES: YES
CAN YOU DO HEAVY WORK AROUND THE HOUSE LIKE SCRUBBING FLOORS OR LIFTING AND MOVING HEAVY FURNITURE: YES
CAN YOU WALK INDOORS, SUCH AS AROUND YOUR HOUSE: YES
CAN YOU CLIMB A FLIGHT OF STAIRS OR WALK UP A HILL: YES
CAN YOU TAKE CARE OF YOURSELF (EAT, DRESS, BATHE, OR USE TOILET): YES
CAN YOU DO YARD WORK LIKE RAKING LEAVES, WEEDING OR PUSHING A MOWER: YES
CAN YOU DO MODERATE WORK AROUND THE HOUSE LIKE VACUUMING, SWEEPING FLOORS OR CARRYING GROCERIES: YES

## 2024-07-30 NOTE — CPM/PAT H&P
CPM/PAT Evaluation       Name: Sabrina Michaud (Sabrina Michaud)  /Age: 1965/59 y.o.     In-Person       Chief Complaint: Preadmission Testing    HPI    Sabrina is a 59 year old female who presents today for preadmission testing for upcoming:  Breast Reconstruction w/Implant - Left   Breast Mastectomy Wound Revision - Left   Insertion Tissue Expander(psb) - Left   Diagnosis: Malignant neoplasm of left female breast, unspecified estrogen receptor status, unspecified site of breast (Multi   Encounter for breast reconstruction following mastectomy   Surgeon: Tim Dorman MD    Patient reports history of Left breast CA s/p bilateral breast mastectomies and implants. Prophylactic Right breast mastectomy due to underlying strong family history of breast cancer.  Postoperatively patient developed left breast implant infection and patient underwent removal of the implant.     Patient is in no acute distress and denies fever, chills, SOB, and chest pain.       Past Medical History:   Diagnosis Date    Anxiety     Breast CA (Multi)     Depression     Diabetes mellitus (Multi)     Fibroid     Hypertension     Liver cirrhosis, alcoholic (Multi)     Neoplasm of unspecified behavior of bone, soft tissue, and skin 2016    Skin growth    Peripheral neuropathy     PONV (postoperative nausea and vomiting)        Past Surgical History:   Procedure Laterality Date    BREAST SURGERY Bilateral     mastectomy with reconstruction    BREAST SURGERY Left     Debridement x 2    HYSTERECTOMY  2016    Hysterectomy    MASTECTOMY Bilateral     with repair    TOE AMPUTATION Right     TONSILLECTOMY  2016    Tonsillectomy     Social History     Tobacco Use    Smoking status: Never    Smokeless tobacco: Never   Vaping Use    Vaping status: Never Used   Substance Use Topics    Alcohol use: Not Currently     Alcohol/week: 2.0 - 3.0 standard drinks of alcohol     Types: 2 - 3 Glasses of wine per week     Comment: occasionally     Drug use: Never        Patient Sexual activity questions deferred to the physician.    Family History   Problem Relation Name Age of Onset    Diabetes Mother      Cancer Father      Ovarian cancer Sister      Diabetes Sibling      Neuropathy Sibling      Cancer Other          Grandparent    Diabetes Other          Grandparent    Cancer Other          Aunt       Allergies   Allergen Reactions    Other Other     Anesthesia IV Set MISC- Nausea    Piperacillin-Tazobactam Swelling, Hives and Itching    Vancomycin Itching and Rash       Current Outpatient Medications   Medication Instructions    acetaminophen (Tylenol) 325 mg tablet Take 2 tablets by mouth every 6 hours if needed for mild pain (1 - 3)    amLODIPine (NORVASC) 10 mg, oral, Daily    anastrozole (ARIMIDEX) 1 mg, oral, Daily, Swallow whole with a drink of water.    ascorbic acid (VITAMIN C) 250 mg, oral, 2 times daily    atorvastatin (LIPITOR) 20 mg, oral, Daily    [START ON 8/13/2024] chlorhexidine (Peridex) 0.12 % solution Use as directed    ferrous gluconate 324 (38 Fe) mg tablet TAKE 1 TABLET IN THE MORNING AND 1 TABLET BEFORE BEDTIME    gabapentin (Neurontin) 400 mg capsule TAKE 1 CAPSULE IN THE MORNING AND 1 CAPSULE IN THE EVENING AND 1 CAPSULE BEFORE BEDTIME    Janumet  mg tablet TAKE 1 TABLET TWICE A DAY WITH MEALS (NO FURTHER REFILLS UNTIL SEEN IN OFFICE)    losartan-hydrochlorothiazide (Hyzaar) 50-12.5 mg tablet 1 tablet, oral, Daily, No further refills until seen in office    metoprolol succinate XL (TOPROL-XL) 50 mg, oral, Daily    multivit-min/iron/folic acid/K (ADULTS MULTIVITAMIN ORAL) 1 tablet, oral, Daily    ondansetron (Zofran) 4 mg tablet Take 1 tablet by mouth every 6 hours if needed for nausea    potassium chloride CR 20 mEq ER tablet TAKE 1 TABLET TWICE A DAY (NO FURTHER REFILLS UNTIL SEEN IN OFFICE)    sertraline (ZOLOFT) 100 mg, oral, 2 times daily       Review of Systems   Constitutional:  Negative for fatigue and fever.   HENT:  "Negative.     Eyes: Negative.         Glasses   Respiratory:  Negative for cough, shortness of breath and wheezing.    Cardiovascular:  Negative for chest pain and leg swelling.   Gastrointestinal:  Negative for constipation, diarrhea, nausea and vomiting.   Endocrine: Negative.    Genitourinary: Negative.    Musculoskeletal:  Negative for arthralgias and myalgias.   Skin: Negative.    Allergic/Immunologic: Negative.    Neurological:  Positive for numbness (Peripheral Neuropathy taking gabapentin).   Hematological: Negative.    Psychiatric/Behavioral:          Depression & Anxiety taking Zoloft        /79   Pulse 92   Temp 36.8 °C (98.2 °F) (Temporal)   Resp 16   Ht 1.626 m (5' 4\")   Wt 82.1 kg (181 lb)   SpO2 100%   BMI 31.07 kg/m²     Physical Exam  Vitals reviewed.   Constitutional:       General: She is not in acute distress.     Appearance: She is not toxic-appearing.   HENT:      Head: Normocephalic and atraumatic.      Nose: Nose normal.      Mouth/Throat:      Mouth: Mucous membranes are moist.   Eyes:      Extraocular Movements: Extraocular movements intact.      Conjunctiva/sclera: Conjunctivae normal.      Pupils: Pupils are equal, round, and reactive to light.   Cardiovascular:      Rate and Rhythm: Normal rate and regular rhythm.      Pulses: Normal pulses.      Heart sounds: Normal heart sounds.   Pulmonary:      Effort: Pulmonary effort is normal. No respiratory distress.      Breath sounds: Normal breath sounds. No wheezing, rhonchi or rales.   Abdominal:      General: Bowel sounds are normal.      Palpations: Abdomen is soft.   Genitourinary:     Comments: Deferred  Musculoskeletal:         General: No swelling.      Cervical back: Normal range of motion and neck supple.      Right lower leg: No edema.      Left lower leg: No edema.   Skin:     General: Skin is warm and dry.   Neurological:      General: No focal deficit present.      Mental Status: She is alert and oriented to person, " place, and time.   Psychiatric:         Mood and Affect: Mood normal.         Behavior: Behavior normal.          PAT AIRWAY:   Airway:     Mallampati::  III   Denies loose/chipped teeth.      ASA: II  DASI: 36.7  METS: 7.3  CHADS: 4.0%  RCRI: 0.4%  Stop Bang: 3     Assessment and Plan:     Malignant neoplasm of left female breast, unspecified estrogen receptor status, unspecified site of breast (Multi), Encounter for breast reconstruction following mastectomy- Breast Reconstruction w/Implant - Left, Breast Mastectomy Wound Revision - Left, Insertion Tissue Expander(psb) - Left- Scheduled with Tim Dorman MD 08/14/24.  HTN- managed by PCP  DM- managed by PCP  Dyslipidemia- managed by PCP  Liver Cirrhosis- alcohol induced per PCP note 1/17/24; referred to Dr. Abeba FAULKNER  Depression & Anxiety- managed by PCP  CMP, CBC, EKG, MRSA ordered in PAT    Natalie Hernandez, APRN-CNP

## 2024-07-30 NOTE — PREPROCEDURE INSTRUCTIONS
Why must I stop eating and drinking near surgery time?  With sedation, food or liquid in your stomach can enter your lungs causing serious complications  Increases nausea and vomiting    When do I need to stop eating and drinking before my surgery?   Do not eat or drink after midnight the night before your surgery/procedure.  You may have small sips of water to take your medication.  [facility]      [address] [id number] [phone]  Second Floor    Please let your surgeon know if:      You develop any open sores, shingles, burning or painful urination as these may increase your risk of an infection.   You no longer wish to have the surgery.   Any other personal circumstances change that may lead to the need to cancel or defer this surgery-such as being sick or getting admitted to any hospital within one week of your planned procedure.    Your contact details change, such as a change of address or phone number.    Starting now:     Please DO NOT drink alcohol or smoke for 24 hours before surgery. It is well known that quitting smoking can make a huge difference to your health and recovery from surgery. The longer you abstain from smoking, the better your chances of a healthy recovery. If you need help with quitting, call 7-639-QUITNOW to be connected to a trained counselor who will discuss the best methods to help you quit.     Before your surgery:    Please stop all supplements 7 days prior to surgery. Or as directed by your surgeon.   Please stop taking NSAID pain medicine such as Advil and Motrin 7 days before surgery.    If you develop any fever, cough, cold, rashes, cuts, scratches, scrapes, urinary symptoms or infection anywhere on your body (including teeth and gums) prior to surgery, please call your surgeon’s office as soon as possible. This may require treatment to reduce the chance of cancellation on the day of surgery.    The day before your  surgery:   DIET- Please follow the diet instructions at the top of your packet.   Get a good night’s rest.  Use the special soap for bathing if you have been instructed to use one.    Scheduled surgery times may change and you will be notified if this occurs - please check your personal voicemail for any updates.     On the morning of surgery:   Wear comfortable, loose fitting clothes which open in the front. Please do not wear moisturizers, creams, lotions, makeup or perfume.    Please bring with you to surgery:   Photo ID and insurance card   Current list of medicines and allergies   Pacemaker/ Defibrillator/Heart stent cards   CPAP machine and mask    Slings/ splints/ crutches   A copy of your complete advanced directive/DHPOA.    Please do NOT bring with you to surgery:   All jewelry and valuables should be left at home.   Prosthetic devices such as contact lenses, hearing aids, dentures, eyelash extensions, hairpins and body piercings must be removed prior to going in to the surgical suite.    After outpatient surgery:   A responsible adult MUST accompany you at the time of discharge and stay with you for 24 hours after your surgery. You may NOT drive yourself home after surgery.    Do not drive, operate machinery, make critical decisions or do activities that require co-ordination or balance until after a night’s sleep.   Do not drink alcoholic beverages for 24 hours.   Instructions for resuming your medications will be provided by your surgeon.    CALL YOUR DOCTOR AFTER SURGERY IF YOU HAVE:     Chills and/or a fever of 101° F or higher.    Redness, swelling, pus or drainage from your surgical wound or a bad smell from the wound.    Lightheadedness, fainting or confusion.    Persistent vomiting (throwing up) and are not able to eat or drink for 12 hours.    Three or more loose, watery bowel movements in 24 hours (diarrhea).   Difficulty or pain while urinating( after non-urological surgery)    Pain and  swelling in your legs, especially if it is only on one side.    Difficulty breathing or are breathing faster than normal.    Any new concerning symptoms.        Patient Information: Pre-Operative Infection Prevention Measures     Why did I have my nose, under my arms, and groin swabbed?  The purpose of the swab is to identify Staphylococcus aureus inside your nose or on your skin.  The swab was sent to the laboratory for culture.  A positive swab/culture for Staphylococcus aureus is called colonization or carriage.      What is Staphylococcus aureus?  Staphylococcus aureus, also known as “staph”, is a germ found on the skin or in the nose of healthy people.  Sometimes Staphylococcus aureus can get into the body and cause an infection.  This can be minor (such as pimples, boils, or other skin problems).  It might also be serious (such as a blood infection, pneumonia, or a surgical site infection).    What is Staphylococcus aureus colonization or carriage?  Colonization or carriage means that a person has the germ but is not sick from it.  These bacteria can be spread on the hands or when breathing or sneezing.    How is Staphylococcus aureus spread?  It is most often spread by close contact with a person or item that carries it.    What happens if my culture is positive for Staphylococcus aureus?  Your doctor/medical team will use this information to guide any antibiotic treatment which may be necessary.  Regardless of the culture results, we will clean the inside of your nose with a betadine swab just before you have your surgery.      Will I get an infection if I have Staphylococcus aureus in my nose or on my skin?  Anyone can get an infection with Staphylococcus aureus.  However, the best way to reduce your risk of infection is to follow the instructions provided to you for the use of your CHG soap and dental rinse.        Patient Information: Oral/Dental Rinse    What is oral/dental rinse?   It is a mouthwash. It  is a way of cleaning the mouth with a germ-killing solution before your surgery.  The solution contains chlorhexidine, commonly known as CHG.   It is used inside the mouth to kill a bacteria known as Staphylococcus aureus.  Let your doctor know if you are allergic to Chlorhexidine.    Why do I need to use CHG oral/dental rinse?  The CHG oral/dental rinse helps to kill a bacteria in your mouth known as Staphylococcus aureus.     This reduces the risk of infection at the surgical site.      Using your CHG oral/dental rinse  STEPS:  Use your CHG oral/dental rinse after you brush your teeth the night before (at bedtime) and the morning of your surgery.  Follow all directions on your prescription label.    Use the cap on the container to measure 15ml   Swish (gargle if you can) the mouthwash in your mouth for at least 30 seconds, (do not swallow) and spit out  After you use your CHG rinse, do not rinse your mouth with water, drink or eat.  Please refer to the prescription label for the appropriate time to resume oral intake      What side effects might I have using the CHG oral/dental rinse?  CHG rinse will stick to plaque on the teeth.  Brush and floss just before use.  Teeth brushing will help avoid staining of plaque during use.      Patient Information: Home Preoperative Antibacterial Shower      What is a home preoperative antibacterial shower?  This shower is a way of cleaning the skin with a germ-killing solution before surgery.  The solution contains chlorhexidine, commonly known as CHG.  CHG is a skin cleanser with germ-killing ability.  Let your doctor know if you are allergic to chlorhexidine.    Why do I need to take a preoperative antibacterial shower?  Skin is not sterile.  It is best to try to make your skin as free of germs as possible before surgery.  Proper cleansing with a germ-killing soap before surgery can lower the number of germs on your skin.  This helps to reduce the risk of infection at the  surgical site.  Following the instructions listed below will help you prepare your skin for surgery.      How do I use the solution?  Steps:  Begin using your CHG soap 5 days before your scheduled surgery on ________________________.    First, wash and rinse your hair using the CHG soap. Keep CHG soap away from ear canals and eyes.  Rinse completely, do not condition.  Hair extensions should be removed.  Wash your face with your normal soap and rinse.    Apply the CHG solution to a clean wet washcloth.  Turn the water off or move away from the water spray to avoid premature rinsing of the CHG soap as you are applying.   Firmly lather your entire body from the neck down.  Do not use on your face.  Pay special attention to the area(s) where your incision(s) will be located unless they are on your face.  Avoid scrubbing your skin too hard.  The important point is to have the CHG soap sit on your skin for 3 minutes.    When the 3 minutes are up, turn on the water and rinse the CHG solution off your body completely.   DO NOT wash with regular soap after you have used the CHG soap solution  Pat yourself dry with a clean, freshly-laundered towel.  DO NOT apply powders, deodorants, or lotions.  Dress in clean, freshly laundered nightclothes.    Be sure to sleep with clean, freshly laundered sheets.  Be aware that CHG will cause stains on fabrics; if you wash them with bleach after use.  Rinse your washcloth and other linens that have contact with CHG completely.  Use only non-chlorine detergents to launder the items used.   The morning of surgery is the fifth day.  Repeat the above steps and dress in clean comfortable clothing     Whom should I contact if I have any questions regarding the use of CHG soap?  Call the [facility] at [phone] if you have any questions.                    Medication List            Accurate as of July 30, 2024  7:12 AM.  Always use your most recent med list.                acetaminophen 325 mg tablet  Commonly known as: Tylenol  Take 2 tablets by mouth every 6 hours if needed for mild pain (1 - 3)  Notes to patient: Take as needed.     ADULTS MULTIVITAMIN ORAL  Medication Adjustments for Surgery: Stop 7 days before surgery     amLODIPine 10 mg tablet  Commonly known as: Norvasc  Take 1 tablet (10 mg) by mouth once daily.  Medication Adjustments for Surgery: Take morning of surgery with sip of water, no other fluids     anastrozole 1 mg tablet  Commonly known as: Arimidex  Take 1 tablet (1 mg total) by mouth once daily.  Swallow whole with a drink of water.  Medication Adjustments for Surgery: Take morning of surgery with sip of water, no other fluids     ascorbic acid 250 mg tablet  Commonly known as: Vitamin C  Medication Adjustments for Surgery: Stop 7 days before surgery     atorvastatin 20 mg tablet  Commonly known as: Lipitor  Take 1 tablet (20 mg) by mouth once daily.  Medication Adjustments for Surgery: Take morning of surgery with sip of water, no other fluids     ferrous gluconate 324 (38 Fe) mg tablet  Commonly known as: Fergon  TAKE 1 TABLET IN THE MORNING AND 1 TABLET BEFORE BEDTIME  Medication Adjustments for Surgery: Stop 1 day before surgery     gabapentin 400 mg capsule  Commonly known as: Neurontin  TAKE 1 CAPSULE IN THE MORNING AND 1 CAPSULE IN THE EVENING AND 1 CAPSULE BEFORE BEDTIME  Medication Adjustments for Surgery: Take morning of surgery with sip of water, no other fluids     Janumet  mg tablet  Generic drug: SITagliptin phos-metformin  TAKE 1 TABLET TWICE A DAY WITH MEALS (NO FURTHER REFILLS UNTIL SEEN IN OFFICE)  Notes to patient: Hold dose day of surgery.     losartan-hydrochlorothiazide 50-12.5 mg tablet  Commonly known as: Hyzaar  Take 1 tablet by mouth once daily. No further refills until seen in office  Notes to patient: HOLD A FULL 24 HOURS BEFORE SURGERY     metoprolol succinate XL 50 mg 24 hr  tablet  Commonly known as: Toprol-XL  Take 1 tablet (50 mg) by mouth once daily.  Medication Adjustments for Surgery: Take morning of surgery with sip of water, no other fluids     ondansetron 4 mg tablet  Commonly known as: Zofran  Take 1 tablet by mouth every 6 hours if needed for nausea  Notes to patient: Not taking     potassium chloride CR 20 mEq ER tablet  Commonly known as: Klor-Con M20  TAKE 1 TABLET TWICE A DAY (NO FURTHER REFILLS UNTIL SEEN IN OFFICE)  Medication Adjustments for Surgery: Take morning of surgery with sip of water, no other fluids     sertraline 100 mg tablet  Commonly known as: Zoloft  Take 1 tablet (100 mg) by mouth 2 times a day.  Medication Adjustments for Surgery: Take morning of surgery with sip of water, no other fluids

## 2024-07-31 LAB
ATRIAL RATE: 93 BPM
P AXIS: 54 DEGREES
P OFFSET: 195 MS
P ONSET: 130 MS
PR INTERVAL: 176 MS
Q ONSET: 218 MS
QRS COUNT: 15 BEATS
QRS DURATION: 90 MS
QT INTERVAL: 370 MS
QTC CALCULATION(BAZETT): 460 MS
QTC FREDERICIA: 428 MS
R AXIS: 51 DEGREES
T AXIS: 63 DEGREES
T OFFSET: 403 MS
VENTRICULAR RATE: 93 BPM

## 2024-08-01 DIAGNOSIS — I10 BENIGN ESSENTIAL HYPERTENSION: ICD-10-CM

## 2024-08-01 DIAGNOSIS — F32.A DEPRESSION, UNSPECIFIED DEPRESSION TYPE: ICD-10-CM

## 2024-08-01 LAB — STAPHYLOCOCCUS SPEC CULT: NORMAL

## 2024-08-01 RX ORDER — AMLODIPINE BESYLATE 10 MG/1
10 TABLET ORAL DAILY
Qty: 90 TABLET | Refills: 0 | Status: SHIPPED | OUTPATIENT
Start: 2024-08-01

## 2024-08-01 RX ORDER — LOSARTAN POTASSIUM AND HYDROCHLOROTHIAZIDE 12.5; 5 MG/1; MG/1
TABLET ORAL
Qty: 90 TABLET | Refills: 0 | Status: SHIPPED | OUTPATIENT
Start: 2024-08-01

## 2024-08-01 RX ORDER — SERTRALINE HYDROCHLORIDE 100 MG/1
100 TABLET, FILM COATED ORAL 2 TIMES DAILY
Qty: 180 TABLET | Refills: 0 | Status: SHIPPED | OUTPATIENT
Start: 2024-08-01

## 2024-08-01 RX ORDER — METOPROLOL SUCCINATE 50 MG/1
50 TABLET, EXTENDED RELEASE ORAL DAILY
Qty: 90 TABLET | Refills: 0 | Status: SHIPPED | OUTPATIENT
Start: 2024-08-01

## 2024-08-07 DIAGNOSIS — D50.9 IRON DEFICIENCY ANEMIA, UNSPECIFIED IRON DEFICIENCY ANEMIA TYPE: ICD-10-CM

## 2024-08-07 RX ORDER — FERROUS GLUCONATE 324(38)MG
TABLET ORAL
Qty: 180 TABLET | Refills: 1 | Status: SHIPPED | OUTPATIENT
Start: 2024-08-07

## 2024-08-13 ENCOUNTER — ANESTHESIA EVENT (OUTPATIENT)
Dept: OPERATING ROOM | Facility: HOSPITAL | Age: 59
End: 2024-08-13
Payer: COMMERCIAL

## 2024-08-14 ENCOUNTER — HOSPITAL ENCOUNTER (OUTPATIENT)
Facility: HOSPITAL | Age: 59
Setting detail: OUTPATIENT SURGERY
Discharge: HOME | End: 2024-08-14
Attending: SPECIALIST | Admitting: SPECIALIST
Payer: COMMERCIAL

## 2024-08-14 ENCOUNTER — PHARMACY VISIT (OUTPATIENT)
Dept: PHARMACY | Facility: CLINIC | Age: 59
End: 2024-08-14
Payer: COMMERCIAL

## 2024-08-14 ENCOUNTER — ANESTHESIA (OUTPATIENT)
Dept: OPERATING ROOM | Facility: HOSPITAL | Age: 59
End: 2024-08-14
Payer: COMMERCIAL

## 2024-08-14 VITALS
OXYGEN SATURATION: 97 % | TEMPERATURE: 97.3 F | RESPIRATION RATE: 20 BRPM | HEART RATE: 77 BPM | SYSTOLIC BLOOD PRESSURE: 129 MMHG | BODY MASS INDEX: 30.9 KG/M2 | WEIGHT: 181 LBS | DIASTOLIC BLOOD PRESSURE: 61 MMHG | HEIGHT: 64 IN

## 2024-08-14 DIAGNOSIS — C50.312 MALIGNANT NEOPLASM OF LOWER-INNER QUADRANT OF LEFT FEMALE BREAST, UNSPECIFIED ESTROGEN RECEPTOR STATUS (MULTI): ICD-10-CM

## 2024-08-14 DIAGNOSIS — Z85.3 HISTORY OF BREAST CANCER: Primary | ICD-10-CM

## 2024-08-14 DIAGNOSIS — C50.912 MALIGNANT NEOPLASM OF LEFT FEMALE BREAST, UNSPECIFIED ESTROGEN RECEPTOR STATUS, UNSPECIFIED SITE OF BREAST (MULTI): ICD-10-CM

## 2024-08-14 DIAGNOSIS — Z42.1 ENCOUNTER FOR BREAST RECONSTRUCTION FOLLOWING MASTECTOMY: ICD-10-CM

## 2024-08-14 LAB — GLUCOSE BLD MANUAL STRIP-MCNC: 106 MG/DL (ref 74–99)

## 2024-08-14 PROCEDURE — 7100000010 HC PHASE TWO TIME - EACH INCREMENTAL 1 MINUTE: Performed by: SPECIALIST

## 2024-08-14 PROCEDURE — 2500000004 HC RX 250 GENERAL PHARMACY W/ HCPCS (ALT 636 FOR OP/ED): Performed by: ANESTHESIOLOGY

## 2024-08-14 PROCEDURE — 3600000008 HC OR TIME - EACH INCREMENTAL 1 MINUTE - PROCEDURE LEVEL THREE: Performed by: SPECIALIST

## 2024-08-14 PROCEDURE — 2500000001 HC RX 250 WO HCPCS SELF ADMINISTERED DRUGS (ALT 637 FOR MEDICARE OP): Performed by: SPECIALIST

## 2024-08-14 PROCEDURE — 7100000009 HC PHASE TWO TIME - INITIAL BASE CHARGE: Performed by: SPECIALIST

## 2024-08-14 PROCEDURE — 3700000002 HC GENERAL ANESTHESIA TIME - EACH INCREMENTAL 1 MINUTE: Performed by: SPECIALIST

## 2024-08-14 PROCEDURE — 2500000004 HC RX 250 GENERAL PHARMACY W/ HCPCS (ALT 636 FOR OP/ED): Performed by: SPECIALIST

## 2024-08-14 PROCEDURE — 2720000007 HC OR 272 NO HCPCS: Performed by: SPECIALIST

## 2024-08-14 PROCEDURE — 2500000004 HC RX 250 GENERAL PHARMACY W/ HCPCS (ALT 636 FOR OP/ED): Performed by: ANESTHESIOLOGIST ASSISTANT

## 2024-08-14 PROCEDURE — C1889 IMPLANT/INSERT DEVICE, NOC: HCPCS | Performed by: SPECIALIST

## 2024-08-14 PROCEDURE — A9999 DME SUPPLY OR ACCESSORY, NOS: HCPCS | Performed by: SPECIALIST

## 2024-08-14 PROCEDURE — 2500000005 HC RX 250 GENERAL PHARMACY W/O HCPCS: Performed by: SPECIALIST

## 2024-08-14 PROCEDURE — 7100000001 HC RECOVERY ROOM TIME - INITIAL BASE CHARGE: Performed by: SPECIALIST

## 2024-08-14 PROCEDURE — 2500000001 HC RX 250 WO HCPCS SELF ADMINISTERED DRUGS (ALT 637 FOR MEDICARE OP): Performed by: ANESTHESIOLOGY

## 2024-08-14 PROCEDURE — 7100000002 HC RECOVERY ROOM TIME - EACH INCREMENTAL 1 MINUTE: Performed by: SPECIALIST

## 2024-08-14 PROCEDURE — 3700000001 HC GENERAL ANESTHESIA TIME - INITIAL BASE CHARGE: Performed by: SPECIALIST

## 2024-08-14 PROCEDURE — 82947 ASSAY GLUCOSE BLOOD QUANT: CPT

## 2024-08-14 PROCEDURE — 2500000004 HC RX 250 GENERAL PHARMACY W/ HCPCS (ALT 636 FOR OP/ED): Mod: JZ | Performed by: SPECIALIST

## 2024-08-14 PROCEDURE — 2500000005 HC RX 250 GENERAL PHARMACY W/O HCPCS: Performed by: ANESTHESIOLOGIST ASSISTANT

## 2024-08-14 PROCEDURE — RXMED WILLOW AMBULATORY MEDICATION CHARGE

## 2024-08-14 PROCEDURE — 2780000003 HC OR 278 NO HCPCS: Performed by: SPECIALIST

## 2024-08-14 PROCEDURE — 3600000003 HC OR TIME - INITIAL BASE CHARGE - PROCEDURE LEVEL THREE: Performed by: SPECIALIST

## 2024-08-14 DEVICE — IMPLANTABLE DEVICE: Type: IMPLANTABLE DEVICE | Site: BREAST | Status: NON-FUNCTIONAL

## 2024-08-14 DEVICE — NATRELLE INSPIRA SSF 450CC FULL PROFILE  SMOOTH ROUND SILICONE
Type: IMPLANTABLE DEVICE | Site: BREAST | Status: FUNCTIONAL
Brand: NATRELLE INSPIRA SOFTTOUCH BREAST IMPLANTS

## 2024-08-14 RX ORDER — HYDRALAZINE HYDROCHLORIDE 20 MG/ML
10 INJECTION INTRAMUSCULAR; INTRAVENOUS EVERY 30 MIN PRN
Status: DISCONTINUED | OUTPATIENT
Start: 2024-08-14 | End: 2024-08-14 | Stop reason: HOSPADM

## 2024-08-14 RX ORDER — BUPIVACAINE HYDROCHLORIDE 5 MG/ML
INJECTION, SOLUTION EPIDURAL; INTRACAUDAL AS NEEDED
Status: DISCONTINUED | OUTPATIENT
Start: 2024-08-14 | End: 2024-08-14 | Stop reason: HOSPADM

## 2024-08-14 RX ORDER — FAMOTIDINE 20 MG/1
20 TABLET, FILM COATED ORAL ONCE
Status: COMPLETED | OUTPATIENT
Start: 2024-08-14 | End: 2024-08-14

## 2024-08-14 RX ORDER — CELECOXIB 200 MG/1
400 CAPSULE ORAL ONCE
Status: COMPLETED | OUTPATIENT
Start: 2024-08-14 | End: 2024-08-14

## 2024-08-14 RX ORDER — DIPHENHYDRAMINE HYDROCHLORIDE 50 MG/ML
12.5 INJECTION INTRAMUSCULAR; INTRAVENOUS ONCE AS NEEDED
Status: DISCONTINUED | OUTPATIENT
Start: 2024-08-14 | End: 2024-08-14 | Stop reason: HOSPADM

## 2024-08-14 RX ORDER — DEXTROSE, SODIUM CHLORIDE, SODIUM LACTATE, POTASSIUM CHLORIDE, AND CALCIUM CHLORIDE 5; .6; .31; .03; .02 G/100ML; G/100ML; G/100ML; G/100ML; G/100ML
100 INJECTION, SOLUTION INTRAVENOUS CONTINUOUS
Status: DISCONTINUED | OUTPATIENT
Start: 2024-08-14 | End: 2024-08-14 | Stop reason: HOSPADM

## 2024-08-14 RX ORDER — CEFUROXIME SODIUM 1.5 G/16ML
1500 INJECTION, POWDER, FOR SOLUTION INTRAVENOUS EVERY 8 HOURS
Status: DISCONTINUED | OUTPATIENT
Start: 2024-08-14 | End: 2024-08-14 | Stop reason: ENTERED-IN-ERROR

## 2024-08-14 RX ORDER — ENOXAPARIN SODIUM 100 MG/ML
40 INJECTION SUBCUTANEOUS DAILY
Qty: 10 EACH | Refills: 0 | Status: SHIPPED | OUTPATIENT
Start: 2024-08-14 | End: 2024-08-21 | Stop reason: SDUPTHER

## 2024-08-14 RX ORDER — PROPOFOL 10 MG/ML
INJECTION, EMULSION INTRAVENOUS AS NEEDED
Status: DISCONTINUED | OUTPATIENT
Start: 2024-08-14 | End: 2024-08-14

## 2024-08-14 RX ORDER — ONDANSETRON HYDROCHLORIDE 8 MG/1
8 TABLET, FILM COATED ORAL EVERY 8 HOURS PRN
Qty: 20 TABLET | Refills: 0 | Status: SHIPPED | OUTPATIENT
Start: 2024-08-14

## 2024-08-14 RX ORDER — KETOROLAC TROMETHAMINE 30 MG/ML
15 INJECTION, SOLUTION INTRAMUSCULAR; INTRAVENOUS ONCE AS NEEDED
Status: DISCONTINUED | OUTPATIENT
Start: 2024-08-14 | End: 2024-08-14 | Stop reason: HOSPADM

## 2024-08-14 RX ORDER — ROCURONIUM BROMIDE 10 MG/ML
INJECTION, SOLUTION INTRAVENOUS AS NEEDED
Status: DISCONTINUED | OUTPATIENT
Start: 2024-08-14 | End: 2024-08-14

## 2024-08-14 RX ORDER — MEPERIDINE HYDROCHLORIDE 25 MG/ML
12.5 INJECTION INTRAMUSCULAR; INTRAVENOUS; SUBCUTANEOUS EVERY 10 MIN PRN
Status: DISCONTINUED | OUTPATIENT
Start: 2024-08-14 | End: 2024-08-14 | Stop reason: HOSPADM

## 2024-08-14 RX ORDER — LIDOCAINE HYDROCHLORIDE 20 MG/ML
INJECTION, SOLUTION INFILTRATION; PERINEURAL AS NEEDED
Status: DISCONTINUED | OUTPATIENT
Start: 2024-08-14 | End: 2024-08-14

## 2024-08-14 RX ORDER — BUPIVACAINE HYDROCHLORIDE AND EPINEPHRINE 2.5; 5 MG/ML; UG/ML
INJECTION, SOLUTION EPIDURAL; INFILTRATION; INTRACAUDAL; PERINEURAL AS NEEDED
Status: DISCONTINUED | OUTPATIENT
Start: 2024-08-14 | End: 2024-08-14 | Stop reason: HOSPADM

## 2024-08-14 RX ORDER — OXYCODONE HCL 10 MG/1
10 TABLET, FILM COATED, EXTENDED RELEASE ORAL ONCE AS NEEDED
Status: DISCONTINUED | OUTPATIENT
Start: 2024-08-14 | End: 2024-08-14 | Stop reason: HOSPADM

## 2024-08-14 RX ORDER — OXYCODONE AND ACETAMINOPHEN 5; 325 MG/1; MG/1
1 TABLET ORAL EVERY 6 HOURS PRN
Qty: 20 TABLET | Refills: 0 | Status: SHIPPED | OUTPATIENT
Start: 2024-08-14

## 2024-08-14 RX ORDER — MIDAZOLAM HYDROCHLORIDE 1 MG/ML
INJECTION, SOLUTION INTRAMUSCULAR; INTRAVENOUS AS NEEDED
Status: DISCONTINUED | OUTPATIENT
Start: 2024-08-14 | End: 2024-08-14

## 2024-08-14 RX ORDER — FENTANYL CITRATE 50 UG/ML
INJECTION, SOLUTION INTRAMUSCULAR; INTRAVENOUS AS NEEDED
Status: DISCONTINUED | OUTPATIENT
Start: 2024-08-14 | End: 2024-08-14

## 2024-08-14 RX ORDER — ALBUTEROL SULFATE 0.83 MG/ML
2.5 SOLUTION RESPIRATORY (INHALATION) ONCE AS NEEDED
Status: DISCONTINUED | OUTPATIENT
Start: 2024-08-14 | End: 2024-08-14 | Stop reason: HOSPADM

## 2024-08-14 RX ORDER — LABETALOL HYDROCHLORIDE 5 MG/ML
10 INJECTION, SOLUTION INTRAVENOUS ONCE AS NEEDED
Status: DISCONTINUED | OUTPATIENT
Start: 2024-08-14 | End: 2024-08-14 | Stop reason: HOSPADM

## 2024-08-14 RX ORDER — DEXMEDETOMIDINE HYDROCHLORIDE 100 UG/ML
INJECTION, SOLUTION INTRAVENOUS AS NEEDED
Status: DISCONTINUED | OUTPATIENT
Start: 2024-08-14 | End: 2024-08-14

## 2024-08-14 RX ORDER — HYDROMORPHONE HYDROCHLORIDE 0.2 MG/ML
0.2 INJECTION INTRAMUSCULAR; INTRAVENOUS; SUBCUTANEOUS EVERY 5 MIN PRN
Status: DISCONTINUED | OUTPATIENT
Start: 2024-08-14 | End: 2024-08-14 | Stop reason: HOSPADM

## 2024-08-14 RX ORDER — GABAPENTIN 300 MG/1
600 CAPSULE ORAL ONCE
Status: DISCONTINUED | OUTPATIENT
Start: 2024-08-14 | End: 2024-08-14 | Stop reason: HOSPADM

## 2024-08-14 RX ORDER — NORETHINDRONE AND ETHINYL ESTRADIOL 0.5-0.035
50 KIT ORAL ONCE
Status: DISCONTINUED | OUTPATIENT
Start: 2024-08-14 | End: 2024-08-14 | Stop reason: HOSPADM

## 2024-08-14 RX ORDER — OXYCODONE HYDROCHLORIDE 5 MG/1
5 TABLET ORAL EVERY 6 HOURS PRN
Qty: 20 TABLET | Refills: 0 | Status: SHIPPED | OUTPATIENT
Start: 2024-08-14

## 2024-08-14 RX ORDER — ONDANSETRON HYDROCHLORIDE 2 MG/ML
4 INJECTION, SOLUTION INTRAVENOUS ONCE AS NEEDED
Status: DISCONTINUED | OUTPATIENT
Start: 2024-08-14 | End: 2024-08-14 | Stop reason: HOSPADM

## 2024-08-14 RX ORDER — METOCLOPRAMIDE 10 MG/1
10 TABLET ORAL ONCE
Status: COMPLETED | OUTPATIENT
Start: 2024-08-14 | End: 2024-08-14

## 2024-08-14 RX ORDER — ONDANSETRON HYDROCHLORIDE 2 MG/ML
INJECTION, SOLUTION INTRAVENOUS AS NEEDED
Status: DISCONTINUED | OUTPATIENT
Start: 2024-08-14 | End: 2024-08-14

## 2024-08-14 RX ORDER — HYDROMORPHONE HYDROCHLORIDE 1 MG/ML
INJECTION, SOLUTION INTRAMUSCULAR; INTRAVENOUS; SUBCUTANEOUS AS NEEDED
Status: DISCONTINUED | OUTPATIENT
Start: 2024-08-14 | End: 2024-08-14

## 2024-08-14 RX ORDER — TRANEXAMIC ACID 100 MG/ML
INJECTION, SOLUTION INTRAVENOUS AS NEEDED
Status: DISCONTINUED | OUTPATIENT
Start: 2024-08-14 | End: 2024-08-14

## 2024-08-14 RX ORDER — ALBUTEROL SULFATE 0.83 MG/ML
2.5 SOLUTION RESPIRATORY (INHALATION) ONCE
Status: DISCONTINUED | OUTPATIENT
Start: 2024-08-14 | End: 2024-08-14 | Stop reason: HOSPADM

## 2024-08-14 RX ORDER — TRAMADOL HYDROCHLORIDE 50 MG/1
50 TABLET ORAL EVERY 6 HOURS PRN
Qty: 12 TABLET | Refills: 0 | Status: SHIPPED | OUTPATIENT
Start: 2024-08-14

## 2024-08-14 RX ORDER — GLYCOPYRROLATE 0.2 MG/ML
INJECTION INTRAMUSCULAR; INTRAVENOUS AS NEEDED
Status: DISCONTINUED | OUTPATIENT
Start: 2024-08-14 | End: 2024-08-14

## 2024-08-14 RX ORDER — LIDOCAINE HYDROCHLORIDE AND EPINEPHRINE 10; 10 MG/ML; UG/ML
INJECTION, SOLUTION INFILTRATION; PERINEURAL AS NEEDED
Status: DISCONTINUED | OUTPATIENT
Start: 2024-08-14 | End: 2024-08-14 | Stop reason: HOSPADM

## 2024-08-14 RX ORDER — ONDANSETRON 4 MG/1
4 TABLET, ORALLY DISINTEGRATING ORAL ONCE
Status: COMPLETED | OUTPATIENT
Start: 2024-08-14 | End: 2024-08-14

## 2024-08-14 RX ORDER — ACETAMINOPHEN 325 MG/1
650 TABLET ORAL ONCE
Status: COMPLETED | OUTPATIENT
Start: 2024-08-14 | End: 2024-08-14

## 2024-08-14 SDOH — HEALTH STABILITY: MENTAL HEALTH: CURRENT SMOKER: 0

## 2024-08-14 ASSESSMENT — COLUMBIA-SUICIDE SEVERITY RATING SCALE - C-SSRS
1. IN THE PAST MONTH, HAVE YOU WISHED YOU WERE DEAD OR WISHED YOU COULD GO TO SLEEP AND NOT WAKE UP?: NO
6. HAVE YOU EVER DONE ANYTHING, STARTED TO DO ANYTHING, OR PREPARED TO DO ANYTHING TO END YOUR LIFE?: NO
2. HAVE YOU ACTUALLY HAD ANY THOUGHTS OF KILLING YOURSELF?: NO

## 2024-08-14 ASSESSMENT — PAIN DESCRIPTION - DESCRIPTORS
DESCRIPTORS: ACHING;THROBBING
DESCRIPTORS: ACHING;THROBBING
DESCRIPTORS: ACHING
DESCRIPTORS: ACHING;THROBBING
DESCRIPTORS: ACHING
DESCRIPTORS: ACHING;THROBBING

## 2024-08-14 ASSESSMENT — PAIN - FUNCTIONAL ASSESSMENT
PAIN_FUNCTIONAL_ASSESSMENT: 0-10

## 2024-08-14 ASSESSMENT — PAIN SCALES - GENERAL
PAINLEVEL_OUTOF10: 0 - NO PAIN
PAINLEVEL_OUTOF10: 0 - NO PAIN
PAINLEVEL_OUTOF10: 6
PAINLEVEL_OUTOF10: 3
PAINLEVEL_OUTOF10: 1
PAIN_LEVEL: 2
PAINLEVEL_OUTOF10: 0 - NO PAIN
PAINLEVEL_OUTOF10: 4
PAINLEVEL_OUTOF10: 4
PAINLEVEL_OUTOF10: 3

## 2024-08-14 ASSESSMENT — PAIN DESCRIPTION - LOCATION: LOCATION: BREAST

## 2024-08-14 ASSESSMENT — PAIN DESCRIPTION - ORIENTATION: ORIENTATION: LEFT

## 2024-08-14 NOTE — ANESTHESIA POSTPROCEDURE EVALUATION
Patient: Sabrina Michaud    Procedure Summary       Date: 08/14/24 Room / Location: [facility] / [facility]    Anesthesia Start: 0740 Anesthesia Stop: 0953    Procedure: Breast Implant with Revision of Reconstructed Breast with Allergan Restore ADM (Left: Breast) Diagnosis:       Malignant neoplasm of left female breast, unspecified estrogen receptor status, unspecified site of breast (Multi)      Encounter for breast reconstruction following mastectomy      (left breast cancer)    Surgeons: Tim Dorman MD Responsible Provider: Ab Ojeda DO    Anesthesia Type: general ASA Status: 3            Anesthesia Type: general    Vitals Value Taken Time   /77 08/14/24 1011   Temp 36.1 °C (97 °F) 08/14/24 0951   Pulse 76 08/14/24 1012   Resp 13 08/14/24 1012   SpO2 95 % 08/14/24 1012   Vitals shown include unfiled device data.    Anesthesia Post Evaluation    Patient location during evaluation: bedside  Patient participation: complete - patient participated  Level of consciousness: awake  Pain score: 2  Pain management: adequate  Airway patency: patent  Two or more strategies used to mitigate risk of obstructive sleep apnea  Cardiovascular status: acceptable  Respiratory status: acceptable  Hydration status: acceptable  Postoperative Nausea and Vomiting: none        No notable events documented.

## 2024-08-14 NOTE — ANESTHESIA PREPROCEDURE EVALUATION
Patient: Sabrina Michaud    Procedure Information       Date/Time: 08/14/24 0730    Procedures:       Breast Implant with Revision of Reconstructed Breast with Allergan Restore ADM (Left: Breast)      Possible Insertion Tissue Expander (Left: Breast) - Prevena 13 cm x1, Allergan Restore Medium thickness size large ([id number]), Allergan Natrelle SSF -450 x 2, Allergan Expander [id number] x 2    Location: [facility] / [facility]    Surgeons: Tim Dorman MD            Relevant Problems   Anesthesia   (+) PONV (postoperative nausea and vomiting)      Cardiac   (+) Atrial tachycardia (CMS-HCC)   (+) Benign essential hypertension      Neuro   (+) Anxiety   (+) Depression      Endocrine   (+) Class 1 obesity with body mass index (BMI) of 31.0 to 31.9 in adult   (+) Diabetes mellitus type 2 in obese      Hematology   (+) Anemia   (+) Coagulation defect, unspecified (Multi)      ID   (+) Acute hematogenous osteomyelitis of right foot (Multi)   (+) Acute osteomyelitis of foot (Multi)   (+) Other acute osteomyelitis, unspecified ankle and foot (Multi)      GYN   (+) Malignant neoplasm of lower-inner quadrant of left female breast, unspecified estrogen receptor status (Multi)       Clinical information reviewed:   Tobacco  Allergies  Meds   Med Hx  Surg Hx  OB Status  Fam Hx  Soc   Hx        NPO Detail:  NPO/Void Status  Carbohydrate Drink Given Prior to Surgery? : N  Date of Last Liquid: 08/14/24  Time of Last Liquid: 0500  Date of Last Solid: 08/13/24  Time of Last Solid: 2330  Last Intake Type: Clear fluids  Time of Last Void: 0636         Physical Exam    Airway  Mallampati: II  TM distance: >3 FB     Cardiovascular   Rhythm: regular  Rate: normal     Dental - normal exam     Pulmonary   Breath sounds clear to auscultation     Abdominal   Abdomen: soft             Anesthesia Plan    History of general anesthesia?: yes  History of complications of general anesthesia?: no    ASA 3     general   (Labs nl, EKG nl,  GLMA 4 in past w/o diff.)  The patient is not a current smoker.    intravenous induction   Postoperative administration of opioids is intended.  Anesthetic plan and risks discussed with patient.    Plan discussed with CRNA, attending and CAA.

## 2024-08-14 NOTE — ANESTHESIA PROCEDURE NOTES
Airway  Date/Time: 8/14/2024 7:51 AM  Urgency: elective    Airway not difficult    Staffing  Performed: CAA   Authorized by: Ab Ojeda DO    Performed by: MONTRELL Coburn  Patient location during procedure: OR    Indications and Patient Condition  Indications for airway management: anesthesia  Spontaneous Ventilation: absent  Sedation level: deep  Preoxygenated: yes  Patient position: sniffing  Mask difficulty assessment: 1 - vent by mask    Final Airway Details  Final airway type: endotracheal airway      Successful airway: ETT  Cuffed: yes   Successful intubation technique: direct laryngoscopy  Endotracheal tube insertion site: oral  Blade: Heath  Blade size: #3  ETT size (mm): 7.0  Cormack-Lehane Classification: grade IIa - partial view of glottis  Placement verified by: chest auscultation   Measured from: lips  ETT to lips (cm): 22  Number of attempts at approach: 1  Ventilation between attempts: BVM    Additional Comments  Easy by MONTRELL

## 2024-08-14 NOTE — POST-PROCEDURE NOTE
Pt arrived to [facility] via cart from [facility] in no acute distress. Spouse with pt .  2 jps intact and attached to bra . Wound vac  intact.  Cookies and gingerale taken well.  1300  2 jps emptied for 20 ml serosanguinous fluid on #1..  25ml emptied from #2 with serosanguinous. Fluid   pt and spouse know how to empty and record fluid and strip tubing.  Written RENUKA instructions and daily recording sheet given to spouse  Instructed to bring to dr Cheek office for follow up.  Supplies for emptying renuka drains  for home given to pt..  Lovenox  injection given to pt at 1130 per pt request to show her how to do the injection.  Pt instructed not to take again untl 1130 am tomorrow.  1315 up to bathroom  To void without any problems urinating  ambulating well with one nurse.

## 2024-08-14 NOTE — OP NOTE
Breast Implant with Revision of Reconstructed Breast with Allergan Restore ADM (L) Operative Note     Date: 2024  OR Location: [facility]    Name: Sabrina Michaud : 1965, Age: 59 y.o., MRN: [id number], Sex: female    Diagnosis  Pre-op Diagnosis      * Malignant neoplasm of left female breast, unspecified estrogen receptor status, unspecified site of breast (Multi) [C50.912]     * Encounter for breast reconstruction following mastectomy [Z42.1]  Previous left breast mastectomy, hematoma, left nipple areolar complex necrosis, Status post debridement and explantation ADM and explantation implant reconstruction Post-op Diagnosis     * Malignant neoplasm of left female breast, unspecified estrogen receptor status, unspecified site of breast (Multi) [C50.912]     * Encounter for breast reconstruction following mastectomy [Z42.1]  Same     Procedures  Breast Implant with Revision of Reconstructed Breast with Allergan Restore ADM  38175 - [place] INSJ/RPLCMT BREAST IMPLANT SEP DAY MASTECTOMY    Breast Implant with Revision of Reconstructed Breast with Allergan Restore ADM  23809 - [place] REVISION OF RECONSTRUCTED BREAST    Left-sided breast delayed implant reconstruction direct to  implant with acellular dermal matrix using AlloDerm select restore 327 cm²  Natrelle Inspira 450 cc SSF smooth round implant  Left PECS I & II block  Surgeons      * Tim Dorman - Primary    Resident/Fellow/Other Assistant:  Surgeons and Role:  * No surgeons found with a matching role *    Procedure Summary  Anesthesia: Anesthesia type not filed in the log.  ASA: III  Anesthesia Staff: Anesthesiologist: DO ELADIA GarciaAA: MONTRELL Coburn  SRNA: Estefany Olivas  Estimated Blood Loss: 25 mL  Intra-op Medications:   Administrations occurring from 0730 to 1130 on 24:   Medication Name Total Dose   lidocaine-epinephrine (Xylocaine W/EPI) 1 %-1:100,000 injection 10 mL   bupivacaine PF (Marcaine) 0.5 % (5 mg/mL) injection 10 mL    BUPivacaine-EPINEPHrine (PF) (Marcaine w/EPI) 0.25 %-1:200,000 injection 10 mL   cefuroxime (Zinacef) 1.5 g in dextrose 5% 50 mL IV 1.5 g              Anesthesia Record               Intraprocedure I/O Totals          Intake    LR bolus 900.00 mL    Tranexamic Acid 0.00 mL    The total shown is the total volume documented since Anesthesia Start was filed.    Total Intake 900 mL       Output    Urine 100 mL    Est. Blood Loss 25 mL    Total Output 125 mL       Net    Net Volume 775 mL          Specimen: No specimens collected     Staff:   Circulator: Aníbal Person Person: Babatunde         Drains and/or Catheters:   Closed/Suction Drain 1 Inferior;Left Breast Bulb 10 Fr. (Active)       Closed/Suction Drain 2 Inferior;Left Breast Bulb 10 Fr. (Active)       Tourniquet Times:         Implants:  Implants       Type Name Action Serial No.      Breast Implant BREAST SIZER, SALINE, SMOOTH ROUND MOD PLUS, 450CC - [id number] Used, Not Implanted       NATRELLA INSPIRA SOFT TOUCH BREAST IMPLANT 450CC Implanted [id number]      ALLODERM SELECT RESTORE REGENERATIVE TISSUE MATRIX Implanted               Findings:   Height 162.6  Weight 82.1 kg  BMI 31.07  IV fluid 900 cc  Urine output 100 cc  EBL 25    Indications: Sabrina Michaud is an 59 y.o. female who is having surgery for left breast cancer.  She previously underwent a [place] flap reconstruction with implant on day of mastectomy.  Patient developed cellulitis and breast infection requiring explant, debridement, ADM removal.  Now here for implant reconstruction on the left side.    The patient was seen in the preoperative area. The risks, benefits, complications, treatment options, non-operative alternatives, expected recovery and outcomes were discussed with the patient. The possibilities of reaction to medication, pulmonary aspiration, injury to surrounding structures, bleeding, recurrent infection, the need for additional procedures, failure to diagnose a condition, and  creating a complication requiring transfusion or operation were discussed with the patient. The patient concurred with the proposed plan, giving informed consent.  The site of surgery was properly noted/marked if necessary per policy. The patient has been actively warmed in preoperative area. Preoperative antibiotics have been ordered and given within 1 hours of incision. Venous thrombosis prophylaxis have been ordered including bilateral sequential compression devices    Procedure Details: The patient was identified in the holding area and marked in the upright position to make a new fold consistent with her opposite side.  Her risks were reviewed with her and her  present.  A team huddle was performed.  She was taken to the operating room by Dr. Ojeda and team.  A check-in was observed.  Antibiotic was administered on-call to the OR.  General endotracheal anesthesia was established without difficulty.  Flowtron's were on and functioning prior to the induction of anesthesia.  Arms were extended on arm boards and secured.  Her knees were flexed.  Her her heels were off the bed and calves on 2 pillows with Flowtron's repositioned.  A Keene catheter was placed from separate prep area at the beginning of the procedure and removed at the end of the procedure because of case indeterminate duration.  She was infiltrated in the lateral inframammary fold scar 7 cm and in a field block cephalically and laterally with 20 cc of 1% lidocaine with epinephrine 1-100,000 mixed one-to-one with 0.5 Marcaine plain.  Vasoconstriction was awaited.  A lateral scar incision was created with 10 blade knife and deepened with Bovie coagulation, surgical assistant, surgical headlight, 2.5 loupe magnification and bipolar cautery.  The mastectomy tissue flaps were elevated superficial to the chest wall and anterior to the pectoralis fascia and muscle there was a central area of deficient pectoralis over which the ribs were exposed.   Here the skin flap was satisfactory and dimension to accommodate continuation as a prepectoral dissection for prepectoral breast reconstruction.  The circumferential dissection and creating a pocket was satisfactorily elevated with Bovie coagulation at low current.  Small perforators were ligated with 3-0 Vicryl ties and tonsil clamps.  Bipolar cautery was used judiciously.  The skin flap was very satisfactory.  The space was irrigated with Irrisept and then sterile Betadine.  Acellular dermal matrix was retrieved from its container and irrigated with serial dilution technique with 1 L of saline.  The implant selection was first verified by using an air-filled temporary sizer and placing an in situ into the space.  The patient was sat upright 60 degrees to assess for overall aesthetic.  Because of the scar rate in the anterior chest wall there was some constriction in the anterior chest wall but given her patient's desire for single-stage reconstruction this is a satisfactory trade off for direct implant course.  The sizer was removed.  The 450 SSF implant was placed in situ after a pursestring suture of 2-0 PDS was placed on the perimeter of the Alloderm select restore.  This was tied without undue tension, enshrouding the implant with ADM.  Gloves were changed for this tissue matrix and implant handling.  PECS 1 and pexed to block was placed under direct vision using an angled 20-gauge spinal needle and 10 cc of 0.25% Marcaine plain.  Using a Sow funnel and a single attempt the ADM was transferred into the anterior chest wall with appropriate orientation of the implant and ADM.  10 Korean drains were brought out through the lateral stab sites and secured at the skin with 3-0 nylon sutures.  The lateral drain was directed cephalically and the medial drain was directed inferiorly.  The patient is set up right 30 degrees.  Using lighted retractor and Mohini retractors perimeter sutures of 2-0 PDS figure-of-eight  were placed into the anterior chest wall and ADM securing it in good position relative to the opposite side breast morphology.  This is performed without needlestick injury to the implant.  Once the sutures were tied the patient was reassessed.  The drains were directed appropriately lateral cephalic, medial inferiorly.  Space was irrigated once again with Irrisept and then Betadine.  The space was suctioned and hemostasis was pristine.  The deep closure to close the lateral dead space was with figure-of-eight 2-0 PDS buried sutures in Jarek's fascia.  Figure-of-eight 2-0 PDS sutures were placed without injury to the implant or ADM.  The skin was closed with figure-of-eight buried 3-0 Monocryl and interrupted buried 3-0 Monocryl followed by running subcuticular 3-0 Monocryl.  In each component of this entire procedure, a surgical assistant was integral for safety and exposure, hemostasis and surgical execution. She was cleansed with Betasept and blood.  13 cm Prevena was placed in the anterior chest wall.  Patches were placed on the drains.  These held bulb suction.  The patient had a Kerlix gauze pad in the anterior chest wall followed by surgical bra.  Her Keene catheter was removed.  She was transported on to the waiting rOxford then extubated.  She was transported in satisfactory condition to the postanesthesia care unit.  Blood loss was 25 cc  Complications:  None; patient tolerated the procedure well.    Disposition: PACU - hemodynamically stable.  Condition: stable         Additional Details: Eli 13 cm, 10 Iranian drains x 2    Attending Attestation: I performed the procedure.    Tim Dorman  Phone Number: [phone]

## 2024-08-21 ENCOUNTER — PHARMACY VISIT (OUTPATIENT)
Dept: PHARMACY | Facility: CLINIC | Age: 59
End: 2024-08-21
Payer: COMMERCIAL

## 2024-08-21 PROCEDURE — RXMED WILLOW AMBULATORY MEDICATION CHARGE

## 2024-08-21 RX ORDER — ENOXAPARIN SODIUM 100 MG/ML
40 INJECTION SUBCUTANEOUS
Qty: 1.6 ML | Refills: 0 | OUTPATIENT
Start: 2024-08-21

## 2024-09-11 ENCOUNTER — APPOINTMENT (OUTPATIENT)
Dept: RADIOLOGY | Facility: HOSPITAL | Age: 59
End: 2024-09-11
Payer: COMMERCIAL

## 2024-11-04 ENCOUNTER — TELEPHONE (OUTPATIENT)
Dept: HEMATOLOGY/ONCOLOGY | Facility: HOSPITAL | Age: 59
End: 2024-11-04
Payer: COMMERCIAL

## 2024-11-04 NOTE — TELEPHONE ENCOUNTER
Patient sent a My Chart Appointment Request message 4 days ago about needing to reschedule her appointment with Dr. Noland. I forwarded that message to scheduling. I imagine they called patient. I have copied them on the message.Schedulers-can you please reach back out to the patient to reschedule appointment with Dr. Noland-per patient's request?  Thanks!

## 2024-11-04 NOTE — TELEPHONE ENCOUNTER
Sabrina states that someone called to reschedule her appt with Dr. Noland tomorrow but did not leave their name. She called scheduling and there is no note in the chart to indicate who called or when they want to reschedule her appt.    Sabrina can be reached at [phone].    Message sent to team.

## 2024-11-04 NOTE — TELEPHONE ENCOUNTER
Thank you. It appears as though patient called back and spoke with one of our nurses on the nurse triage line. Are you able to call patient again to reschedule appointment?

## 2024-11-05 ENCOUNTER — TELEPHONE (OUTPATIENT)
Dept: HEMATOLOGY/ONCOLOGY | Facility: CLINIC | Age: 59
End: 2024-11-05
Payer: COMMERCIAL

## 2024-11-05 ENCOUNTER — APPOINTMENT (OUTPATIENT)
Dept: HEMATOLOGY/ONCOLOGY | Facility: CLINIC | Age: 59
End: 2024-11-05
Payer: COMMERCIAL

## 2024-11-13 ENCOUNTER — OFFICE VISIT (OUTPATIENT)
Dept: URGENT CARE | Facility: URGENT CARE | Age: 59
End: 2024-11-13
Payer: COMMERCIAL

## 2024-11-13 VITALS
SYSTOLIC BLOOD PRESSURE: 122 MMHG | HEART RATE: 100 BPM | BODY MASS INDEX: 30.5 KG/M2 | TEMPERATURE: 97.7 F | WEIGHT: 177.69 LBS | RESPIRATION RATE: 18 BRPM | DIASTOLIC BLOOD PRESSURE: 85 MMHG | OXYGEN SATURATION: 96 %

## 2024-11-13 DIAGNOSIS — A69.20 LYME DISEASE: Primary | ICD-10-CM

## 2024-11-13 PROCEDURE — 99203 OFFICE O/P NEW LOW 30 MIN: CPT | Performed by: PHYSICIAN ASSISTANT

## 2024-11-13 PROCEDURE — 3074F SYST BP LT 130 MM HG: CPT | Performed by: PHYSICIAN ASSISTANT

## 2024-11-13 PROCEDURE — 3079F DIAST BP 80-89 MM HG: CPT | Performed by: PHYSICIAN ASSISTANT

## 2024-11-13 RX ORDER — DOXYCYCLINE 100 MG/1
100 TABLET ORAL 2 TIMES DAILY
Qty: 42 TABLET | Refills: 0 | Status: SHIPPED | OUTPATIENT
Start: 2024-11-13 | End: 2024-12-04

## 2024-11-13 ASSESSMENT — ENCOUNTER SYMPTOMS
PSYCHIATRIC NEGATIVE: 1
MUSCULOSKELETAL NEGATIVE: 1
ROS GI COMMENTS: LOSS OF APPETITE
FEVER: 0
RESPIRATORY NEGATIVE: 1
APPETITE CHANGE: 1
CHILLS: 1
ABDOMINAL PAIN: 0
NAUSEA: 1
ACTIVITY CHANGE: 0
DIAPHORESIS: 0
CARDIOVASCULAR NEGATIVE: 1
FATIGUE: 1

## 2024-11-13 ASSESSMENT — PAIN SCALES - GENERAL: PAINLEVEL_OUTOF10: 2

## 2024-11-13 NOTE — PROGRESS NOTES
"Subjective   Patient ID: Sabrina Michaud is a 59 y.o. female. They present today with a chief complaint of Other (Pt. C/O tick bite to lower left abd. Very tired, not eating, body aches, and having nausea. Redness in area./Happened 4 days ago. ).    History of Present Illness  59-year-old female here with general malaise body aches nausea had an embedded tick in the left lateral abdomen noted it 2 days ago had it removed was engorged patient is unsure of how long it may have been in but she thinks for only \"a couple days\" denies any neck pain no headaches    No fevers chills no chest pain no shortness of breath no cough or congestion      History provided by:  Patient      Past Medical History  Allergies as of 11/13/2024 - Reviewed 11/13/2024   Allergen Reaction Noted    Other Other 02/21/2023    Piperacillin-tazobactam Swelling, Hives, and Itching 06/19/2023    Vancomycin Itching and Rash 06/19/2023       (Not in a hospital admission)       Past Medical History:   Diagnosis Date    Anxiety     Breast CA (Multi)     Depression     Diabetes mellitus (Multi)     Dyslipidemia     Fibroid     Hypertension     Liver cirrhosis, alcoholic (Multi)     Neoplasm of unspecified behavior of bone, soft tissue, and skin 08/22/2016    Skin growth    Peripheral neuropathy     PONV (postoperative nausea and vomiting)        Past Surgical History:   Procedure Laterality Date    BREAST SURGERY Bilateral     mastectomy with reconstruction    BREAST SURGERY Left     Debridement x 2    HYSTERECTOMY  05/31/2016    Hysterectomy    MASTECTOMY Bilateral     with repair    TOE AMPUTATION Right     TONSILLECTOMY  05/31/2016    Tonsillectomy        reports that she has never smoked. She has never used smokeless tobacco. She reports that she does not currently use alcohol after a past usage of about 2.0 - 3.0 standard drinks of alcohol per week. She reports that she does not use drugs.    Review of Systems  Review of Systems   Constitutional:  " Positive for appetite change, chills and fatigue. Negative for activity change, diaphoresis and fever.   HENT: Negative.     Respiratory: Negative.     Cardiovascular: Negative.    Gastrointestinal:  Positive for nausea. Negative for abdominal pain.        Loss of appetite   Genitourinary: Negative.    Musculoskeletal: Negative.    Skin: Negative.    Psychiatric/Behavioral: Negative.     All other systems reviewed and are negative.                                 Objective    Vitals:    11/13/24 1558   BP: 122/85   BP Location: Left arm   Patient Position: Sitting   BP Cuff Size: Large adult   Pulse: 100   Resp: 18   Temp: 36.5 °C (97.7 °F)   TempSrc: Oral   SpO2: 96%   Weight: 80.6 kg (177 lb 11.1 oz)     No LMP recorded. Patient has had a hysterectomy.    Physical Exam  Vitals and nursing note reviewed.   Constitutional:       Appearance: Normal appearance.   HENT:      Head: Normocephalic and atraumatic.      Right Ear: Tympanic membrane, ear canal and external ear normal.      Left Ear: Tympanic membrane, ear canal and external ear normal.      Nose: No congestion.      Mouth/Throat:      Mouth: Mucous membranes are moist.   Eyes:      Extraocular Movements: Extraocular movements intact.      Pupils: Pupils are equal, round, and reactive to light.   Neck:      Comments: No nuchal rigidity nontender with palp  Cardiovascular:      Rate and Rhythm: Normal rate and regular rhythm.   Pulmonary:      Effort: Pulmonary effort is normal.      Breath sounds: Normal breath sounds.   Musculoskeletal:      Cervical back: Normal range of motion and neck supple.   Skin:     General: Skin is warm and dry.      Capillary Refill: Capillary refill takes less than 2 seconds.      Comments: Left lower abdomen where the take a daily bedded slight erythema   Neurological:      General: No focal deficit present.      Mental Status: She is alert and oriented to person, place, and time.         Procedures    Point of Care Test &  Imaging Results from this visit  No results found for this visit on 11/13/24.   No results found.    Diagnostic study results (if any) were reviewed by Citlali Darling PA-C.    Assessment/Plan   Allergies, medications, history, and pertinent labs/EKGs/Imaging reviewed by Citlali Darling PA-C.     Medical Decision Making  Differential:   1) Lyme's disease   2) viral syndrome  59-year-old female with general malaise tiredness body aches after removing embedded text patient states she thinks it would have only been embedded a couple days patient denies any fevers no neck pain will treat for Lyme's disease  But stressed to the patient that if her symptoms get worse if other symptoms develop she needs to get rechecked immediately       Plan: Discussed differential with the patient and /or parents family   Patient to  follow up with the PCP in the next 2-3 days  Return for any worsening symptoms or go to the ER for further evaluation. Patient/family/caregiver  understands return   precautions and discharge instructions and is agreeable to the current plan   Impression:        Orders and Diagnoses for this visit    Orders and Diagnoses  There are no diagnoses linked to this encounter.    Medical Admin Record      Patient disposition: Home    Electronically signed by Citlali Darling PA-C  4:28 PM

## 2024-11-21 ENCOUNTER — APPOINTMENT (OUTPATIENT)
Dept: HEMATOLOGY/ONCOLOGY | Facility: CLINIC | Age: 59
End: 2024-11-21
Payer: COMMERCIAL

## 2024-12-06 ENCOUNTER — TELEPHONE (OUTPATIENT)
Dept: HEMATOLOGY/ONCOLOGY | Facility: CLINIC | Age: 59
End: 2024-12-06
Payer: COMMERCIAL

## 2024-12-06 NOTE — TELEPHONE ENCOUNTER
Nurse spoke with patient as she reached out over my chart message changing appts about needing to reschedule visit with dr. Noland. Was able to have patient rescheduled to Catalina Saldivar CNP 12- at 1 pm virtually per patient request.    Patient was very thankful for the ability to change her appt. Also asking to relay message about anastrozole symptoms. Patient reporting that she has been having increase hot flashes and leg cramping since starting medication. She has been off of the medication for about 1 week with no improvement of symptoms. Wondering if there are any options to help manage the symptoms. If you will like for her to continue on her anastrozole she is requesting to have a 90 refill.

## 2024-12-09 ENCOUNTER — APPOINTMENT (OUTPATIENT)
Dept: PRIMARY CARE | Facility: CLINIC | Age: 59
End: 2024-12-09
Payer: COMMERCIAL

## 2024-12-19 ENCOUNTER — APPOINTMENT (OUTPATIENT)
Dept: PRIMARY CARE | Facility: CLINIC | Age: 59
End: 2024-12-19
Payer: COMMERCIAL

## 2024-12-19 VITALS
HEIGHT: 63 IN | TEMPERATURE: 97.1 F | DIASTOLIC BLOOD PRESSURE: 84 MMHG | SYSTOLIC BLOOD PRESSURE: 160 MMHG | HEART RATE: 89 BPM | WEIGHT: 177.4 LBS | OXYGEN SATURATION: 98 % | BODY MASS INDEX: 31.43 KG/M2

## 2024-12-19 DIAGNOSIS — Z12.11 SCREEN FOR COLON CANCER: ICD-10-CM

## 2024-12-19 DIAGNOSIS — D50.9 IRON DEFICIENCY ANEMIA, UNSPECIFIED IRON DEFICIENCY ANEMIA TYPE: ICD-10-CM

## 2024-12-19 DIAGNOSIS — C50.312 MALIGNANT NEOPLASM OF LOWER-INNER QUADRANT OF LEFT FEMALE BREAST, UNSPECIFIED ESTROGEN RECEPTOR STATUS: ICD-10-CM

## 2024-12-19 DIAGNOSIS — I10 HYPERTENSION, UNSPECIFIED TYPE: ICD-10-CM

## 2024-12-19 DIAGNOSIS — E78.5 DYSLIPIDEMIA: ICD-10-CM

## 2024-12-19 DIAGNOSIS — F32.A DEPRESSION, UNSPECIFIED DEPRESSION TYPE: ICD-10-CM

## 2024-12-19 DIAGNOSIS — I47.19 ATRIAL TACHYCARDIA (CMS-HCC): ICD-10-CM

## 2024-12-19 DIAGNOSIS — M86.179 OTHER ACUTE OSTEOMYELITIS, UNSPECIFIED ANKLE AND FOOT (MULTI): ICD-10-CM

## 2024-12-19 DIAGNOSIS — Z00.00 ANNUAL PHYSICAL EXAM: Primary | ICD-10-CM

## 2024-12-19 DIAGNOSIS — E11.69 TYPE 2 DIABETES MELLITUS WITH OTHER SPECIFIED COMPLICATION, UNSPECIFIED WHETHER LONG TERM INSULIN USE (MULTI): ICD-10-CM

## 2024-12-19 DIAGNOSIS — I10 BENIGN ESSENTIAL HYPERTENSION: ICD-10-CM

## 2024-12-19 LAB
POC FINGERSTICK BLOOD GLUCOSE: 106 MG/DL (ref 70–100)
POC HEMOGLOBIN A1C: 4.8 % (ref 4.2–6.5)

## 2024-12-19 PROCEDURE — 3077F SYST BP >= 140 MM HG: CPT | Performed by: INTERNAL MEDICINE

## 2024-12-19 PROCEDURE — 99214 OFFICE O/P EST MOD 30 MIN: CPT | Performed by: INTERNAL MEDICINE

## 2024-12-19 PROCEDURE — 99396 PREV VISIT EST AGE 40-64: CPT | Performed by: INTERNAL MEDICINE

## 2024-12-19 PROCEDURE — 3079F DIAST BP 80-89 MM HG: CPT | Performed by: INTERNAL MEDICINE

## 2024-12-19 PROCEDURE — 82962 GLUCOSE BLOOD TEST: CPT | Performed by: INTERNAL MEDICINE

## 2024-12-19 PROCEDURE — 3008F BODY MASS INDEX DOCD: CPT | Performed by: INTERNAL MEDICINE

## 2024-12-19 PROCEDURE — 83036 HEMOGLOBIN GLYCOSYLATED A1C: CPT | Performed by: INTERNAL MEDICINE

## 2024-12-19 PROCEDURE — 1036F TOBACCO NON-USER: CPT | Performed by: INTERNAL MEDICINE

## 2024-12-19 RX ORDER — LOSARTAN POTASSIUM AND HYDROCHLOROTHIAZIDE 12.5; 5 MG/1; MG/1
1 TABLET ORAL DAILY
Qty: 90 TABLET | Refills: 0 | Status: SHIPPED | OUTPATIENT
Start: 2024-12-19

## 2024-12-19 RX ORDER — ATORVASTATIN CALCIUM 20 MG/1
20 TABLET, FILM COATED ORAL DAILY
Qty: 90 TABLET | Refills: 0 | Status: SHIPPED | OUTPATIENT
Start: 2024-12-19

## 2024-12-19 RX ORDER — SERTRALINE HYDROCHLORIDE 100 MG/1
100 TABLET, FILM COATED ORAL 2 TIMES DAILY
Qty: 180 TABLET | Refills: 0 | Status: SHIPPED | OUTPATIENT
Start: 2024-12-19

## 2024-12-19 RX ORDER — METOPROLOL SUCCINATE 50 MG/1
50 TABLET, EXTENDED RELEASE ORAL DAILY
Qty: 90 TABLET | Refills: 0 | Status: SHIPPED | OUTPATIENT
Start: 2024-12-19

## 2024-12-19 RX ORDER — AMLODIPINE BESYLATE 10 MG/1
10 TABLET ORAL DAILY
Qty: 90 TABLET | Refills: 0 | Status: SHIPPED | OUTPATIENT
Start: 2024-12-19

## 2024-12-19 RX ORDER — GABAPENTIN 400 MG/1
400 CAPSULE ORAL 3 TIMES DAILY
Qty: 270 CAPSULE | Refills: 0 | Status: SHIPPED | OUTPATIENT
Start: 2024-12-19

## 2024-12-19 ASSESSMENT — ENCOUNTER SYMPTOMS
DIARRHEA: 0
HEADACHES: 0
COUGH: 0
UNEXPECTED WEIGHT CHANGE: 0
BLOOD IN STOOL: 0
WHEEZING: 0
ABDOMINAL PAIN: 0
SORE THROAT: 0
PALPITATIONS: 0
FATIGUE: 0
DIFFICULTY URINATING: 0
BRUISES/BLEEDS EASILY: 0
FEVER: 0
SINUS PAIN: 0
ARTHRALGIAS: 0
DIZZINESS: 0

## 2024-12-19 ASSESSMENT — PATIENT HEALTH QUESTIONNAIRE - PHQ9
2. FEELING DOWN, DEPRESSED OR HOPELESS: NOT AT ALL
1. LITTLE INTEREST OR PLEASURE IN DOING THINGS: NOT AT ALL
SUM OF ALL RESPONSES TO PHQ9 QUESTIONS 1 AND 2: 0

## 2024-12-19 ASSESSMENT — ANXIETY QUESTIONNAIRES
2. NOT BEING ABLE TO STOP OR CONTROL WORRYING: NOT AT ALL
5. BEING SO RESTLESS THAT IT IS HARD TO SIT STILL: NOT AT ALL
3. WORRYING TOO MUCH ABOUT DIFFERENT THINGS: NOT AT ALL
GAD7 TOTAL SCORE: 0
1. FEELING NERVOUS, ANXIOUS, OR ON EDGE: NOT AT ALL
7. FEELING AFRAID AS IF SOMETHING AWFUL MIGHT HAPPEN: NOT AT ALL
4. TROUBLE RELAXING: NOT AT ALL
6. BECOMING EASILY ANNOYED OR IRRITABLE: NOT AT ALL

## 2024-12-19 NOTE — PROGRESS NOTES
Subjective   Patient ID: Sabrina Michaud is a 59 y.o. female who presents for Annual Exam and Diabetes (a1c).     Annual preventive visit  -Screening mammogram up-to-date recent history of left breast cancer  - Needs a screening colonoscopy order placed today  -Needs complete blood work  -Vaccinations reviewed  - Needs a bone density order placed continue with calcium and vitamin D    Follow-up  - Status post left breast mastectomy patient underwent also right breast mastectomy and implant due to underlying strong family history of breast cancer  -Left breast cancer under care of oncology takes Arimidex severe hot flashes awaiting follow-up recommendation  Need to proceed with bone density as recommended- Status post right leg cellulitis improving right big toe diabetic ulcer patient under care of podiatry healing well slowly follow-up podiatry  -Diabetes obesity patient takes Janumet twice a day need to lose weight low-carb diet exercise hemoglobin A1c 4.8 compensated  -Hypertension controlled  -Anxiety and depression, uncontrolled , increase Zoloft 100 mg, to BID no medication side effects  Weight loss   -Anemia or leukopenia and thrombocytopenia hepatomegaly unremarkable liver exam on MRI secondary to alcohol induced liver cirrhosis  Patient counseled about alcohol cessation  Use multivitamins high-fiber diet  Reevaluate patient in 3 months    Diabetes  Pertinent negatives for hypoglycemia include no dizziness or headaches. Pertinent negatives for diabetes include no chest pain and no fatigue.          Review of Systems   Constitutional:  Negative for fatigue, fever and unexpected weight change.   HENT:  Negative for congestion, ear discharge, ear pain, mouth sores, sinus pain and sore throat.    Eyes:  Negative for visual disturbance.   Respiratory:  Negative for cough and wheezing.    Cardiovascular:  Negative for chest pain, palpitations and leg swelling.   Gastrointestinal:  Negative for abdominal pain, blood  "in stool and diarrhea.   Genitourinary:  Negative for difficulty urinating.   Musculoskeletal:  Negative for arthralgias.   Skin:  Negative for rash.   Neurological:  Negative for dizziness and headaches.   Hematological:  Does not bruise/bleed easily.   Psychiatric/Behavioral:  Negative for behavioral problems.    All other systems reviewed and are negative.      Objective   Lab Results   Component Value Date    HGBA1C 4.8 12/19/2024      /84   Pulse 89   Temp 36.2 °C (97.1 °F)   Ht 1.6 m (5' 3\")   Wt 80.5 kg (177 lb 6.4 oz)   SpO2 98%   BMI 31.42 kg/m²   Lab Results   Component Value Date    WBC 6.2 07/30/2024    HGB 13.1 07/30/2024    HCT 38.9 07/30/2024     (L) 07/30/2024    CHOL 112 06/01/2022    TRIG 222 (H) 06/01/2022    HDL 32.0 (A) 06/01/2022    ALT 29 07/30/2024    AST 47 (H) 07/30/2024     07/30/2024    K 4.0 07/30/2024     07/30/2024    CREATININE 0.60 07/30/2024    BUN 10 07/30/2024    CO2 26 07/30/2024    TSH 3.89 05/24/2021    INR 1.3 (H) 08/17/2022    HGBA1C 4.8 12/19/2024     par   Physical Exam  Vitals and nursing note reviewed.   Constitutional:       Appearance: Normal appearance.   HENT:      Head: Normocephalic.      Nose: Nose normal.   Eyes:      Conjunctiva/sclera: Conjunctivae normal.      Pupils: Pupils are equal, round, and reactive to light.   Cardiovascular:      Rate and Rhythm: Regular rhythm.   Pulmonary:      Effort: Pulmonary effort is normal.      Breath sounds: Normal breath sounds.   Abdominal:      General: Abdomen is flat.      Palpations: Abdomen is soft.   Musculoskeletal:      Cervical back: Neck supple.   Skin:     General: Skin is warm.   Neurological:      General: No focal deficit present.      Mental Status: She is oriented to person, place, and time.   Psychiatric:         Mood and Affect: Mood normal.         Assessment/Plan   Sabrina was seen today for annual exam and diabetes.  Diagnoses and all orders for this visit:  Annual physical " exam (Primary)  -     CBC and Auto Differential; Future  -     Comprehensive Metabolic Panel; Future  -     Hemoglobin A1C; Future  -     Lipid Panel; Future  -     Magnesium; Future  -     TSH with reflex to Free T4 if abnormal; Future  -     Vitamin D 25-Hydroxy,Total (for eval of Vitamin D levels); Future  Type 2 diabetes mellitus with other specified complication, unspecified whether long term insulin use (Multi)  -     POCT fingerstick glucose manually resulted  -     POCT glycosylated hemoglobin (Hb A1C) manually resulted  Benign essential hypertension  -     amLODIPine (Norvasc) 10 mg tablet; Take 1 tablet (10 mg) by mouth once daily.  -     losartan-hydrochlorothiazide (Hyzaar) 50-12.5 mg tablet; Take 1 tablet by mouth once daily.  -     metoprolol succinate XL (Toprol-XL) 50 mg 24 hr tablet; Take 1 tablet (50 mg) by mouth once daily.  Dyslipidemia  -     atorvastatin (Lipitor) 20 mg tablet; Take 1 tablet (20 mg) by mouth once daily.  Other acute osteomyelitis, unspecified ankle and foot (Multi)  -     gabapentin (Neurontin) 400 mg capsule; Take 1 capsule (400 mg) by mouth 3 times a day.  Depression, unspecified depression type  -     sertraline (Zoloft) 100 mg tablet; Take 1 tablet (100 mg) by mouth 2 times a day.  Screen for colon cancer  -     Colonoscopy Screening; Average Risk Patient; Future  Malignant neoplasm of lower-inner quadrant of left female breast, unspecified estrogen receptor status  Iron deficiency anemia, unspecified iron deficiency anemia type  Hypertension, unspecified type  Atrial tachycardia (CMS-HCC)  Other orders  -     Follow Up In Primary Care - Established; Future    Annual preventive visit  -Screening mammogram up-to-date recent history of left breast cancer  - Needs a screening colonoscopy order placed today  -Needs complete blood work  -Vaccinations reviewed  - Needs a bone density order placed continue with calcium and vitamin D    Follow-up  - Status post left breast  mastectomy patient underwent also right breast mastectomy and implant due to underlying strong family history of breast cancer  -Left breast cancer under care of oncology takes Arimidex severe hot flashes awaiting follow-up recommendation  Need to proceed with bone density as recommended- Status post right leg cellulitis improving right big toe diabetic ulcer patient under care of podiatry healing well slowly follow-up podiatry  -Diabetes obesity patient takes Janumet twice a day need to lose weight low-carb diet exercise hemoglobin A1c 4.8 compensated  -Hypertension controlled  -Anxiety and depression, uncontrolled , increase Zoloft 100 mg, to BID no medication side effects  Weight loss   -Anemia or leukopenia and thrombocytopenia hepatomegaly unremarkable liver exam on MRI secondary to alcohol induced liver cirrhosis  Patient counseled about alcohol cessation  Use multivitamins high-fiber diet  Reevaluate patient in 3 months

## 2024-12-23 ENCOUNTER — TELEMEDICINE (OUTPATIENT)
Dept: HEMATOLOGY/ONCOLOGY | Facility: CLINIC | Age: 59
End: 2024-12-23
Payer: COMMERCIAL

## 2024-12-23 DIAGNOSIS — C50.312 MALIGNANT NEOPLASM OF LOWER-INNER QUADRANT OF LEFT FEMALE BREAST, UNSPECIFIED ESTROGEN RECEPTOR STATUS: Primary | ICD-10-CM

## 2024-12-23 DIAGNOSIS — Z85.3 HISTORY OF BREAST CANCER: ICD-10-CM

## 2024-12-23 DIAGNOSIS — Z51.81 ENCOUNTER FOR MONITORING AROMATASE INHIBITOR THERAPY: ICD-10-CM

## 2024-12-23 DIAGNOSIS — R23.2 HOT FLASHES RELATED TO AROMATASE INHIBITOR THERAPY: ICD-10-CM

## 2024-12-23 DIAGNOSIS — T45.1X5A HOT FLASHES RELATED TO AROMATASE INHIBITOR THERAPY: ICD-10-CM

## 2024-12-23 DIAGNOSIS — Z79.811 ENCOUNTER FOR MONITORING AROMATASE INHIBITOR THERAPY: ICD-10-CM

## 2024-12-23 PROCEDURE — 99214 OFFICE O/P EST MOD 30 MIN: CPT | Performed by: NURSE PRACTITIONER

## 2024-12-23 NOTE — PROGRESS NOTES
Consent:  Verbal consent was requested and obtained from patient on this date for a telehealth visit.    A telephone visit (audio only) between the patient (home) and the provider (at clinic site) was utilized to provide this telehealth service. Verbal consent was requested and obtained from patient on the date of the service for a telehealth visit.     Patient ID: Sabrina Michaud is a 59 y.o. female.    Subjective    HPI Sabrina presents for a short term visit as she was having severe hot flashes from anastrozole and has been off of it for 2 weeks. Sabrina reports improvement in her symptoms since coming off. Sabrina rates her energy level as 5/10. She has increased distress as her brother needs a kidney and liver transplant and is hospitalized awaiting a viable organ.     Objective    BSA: There is no height or weight on file to calculate BSA.  There were no vitals taken for this visit.     Physical Exam N/A as this was a telehealth visit. The patient appears well, she is oriented x3 and a good medical historian.    Performance Status:  Asymptomatic    Assessment/Plan      Sabrina is a 60 yo woman with ahx of left IDC G-3 diagnosed June 2023. She is s/p left mastectomy and was placed on anastrozole following a 3 month delay from surgery to seeing medical oncology. She developed intolerable hot flashes and was taken off for 2 weeks. She is here to discuss further treatment.    Plan:  Hot flashes are greatly improved. Discussed either going back on anastrozole and adding Effexor to help with hot flashes, or, switching to another Aromatase inhibitor to see if there is better tolerance. Sabrina prefers to try another Aromatase inhibitor so she does not need to add another medication.  Sabrina will reach out to [facility] or call me if there are any issues with letrozole and to check with me in about 4 weeks if things are going well.   All of Sabrina's questions/concerns were addressed.  Over 25 minutes of time was spent  with this patient with >50% of the time with education, counseling, and coordination of care.       CANCER HISTORY:    4/17/23 - screening mammogram with a left breast mass     6/5/23 - diagnostic mammogram and ultrasound showing a 1x0.65x1cm mass. No suspicious lymph nodes identified.      6/22/23 - core biopsy of a 1x0.6x1cm left breast mass showing IDC, G2, ER>95%, KS>95%, HER2 negative. Clinical stage IA     9/13/23 - underwent left breast mastectomy and SLNBx. Pathology showing a 1.1x1x0.9cm IDC, grade 3 with 0.4cm DCIS, 0/2 lymph nodes involved - [id number], ER>95%, KS>95%, HER2 1+     Post-op complications included infection requiring implant removal and hyperbaric oxygen.      Delay in presenting to medical oncology, not seen until 3 months after surgery.      Oncotype DX RS: 13. Distant recurrence risk at 9 years with Davis 4%. Absolute chemotherapy benefit <1%    Initiated anastrozole January 2024 (due to delayed wound healing)    Anastrozole discontinued December 2024 due to intolerable hot flashes.    Initiated letrozole 2.5mg December 2024.     Diagnoses and all orders for this visit:  History of breast cancer  -     Clinic Appointment Request  -     Clinic Appointment Request Follow Up; [facility]; Future         SNOW Koenig-CNP

## 2024-12-23 NOTE — PATIENT INSTRUCTIONS
Please call us at [phone] option 5 then option 2 with any questions or concerns       1. Exercise 2.5 hours per week; bone strengthening, cardio-vascular, resistance training.  2. Please do self breast exams monthly.  3. Keep alcohol under 3 drinks per week.  4. Sun safety - limit sun exposure from 11a-2p when its at its hottest, apply 15-30 sun block and re-apply every 1-2 hours if perspiring or swimming.  5. Eat a plant based diet, add in oily fishes such as mackerel, tuna, and salmon.  6. Get in at least 1,000 mg of calcium per day through diet or supplement for bone strength. Examples of foods higher in calcium are milk, yogurt, fruited yogurt, oranges, fortified orange juice, almonds, almond milk, broccoli, spinach, bok geeta, mustard greens, puddings, custards, ice cream, fortified cereals, bars, and crackers.   7. Please stop taking your anastrozole. You are changing over to Letrozole 2.5 mg tablet daily. Please call into office if symptoms arise. This has been sent to your local pharmacy  8. Please call the office if any new mass or rash in or around breast, or any uncontrolled symptoms that last over 2-3 weeks at [phone].  9. Please schedule your mammogram for 2025  10. It was nice seeing you today, Sabrina. I will see you back in April.

## 2024-12-24 RX ORDER — LETROZOLE 2.5 MG/1
2.5 TABLET, FILM COATED ORAL DAILY
Qty: 90 TABLET | Refills: 0 | Status: SHIPPED | OUTPATIENT
Start: 2024-12-24

## 2024-12-29 ENCOUNTER — OFFICE VISIT (OUTPATIENT)
Dept: URGENT CARE | Facility: URGENT CARE | Age: 59
End: 2024-12-29
Payer: COMMERCIAL

## 2024-12-29 VITALS
BODY MASS INDEX: 32.3 KG/M2 | DIASTOLIC BLOOD PRESSURE: 85 MMHG | RESPIRATION RATE: 18 BRPM | WEIGHT: 182.32 LBS | OXYGEN SATURATION: 96 % | SYSTOLIC BLOOD PRESSURE: 157 MMHG | TEMPERATURE: 98.3 F | HEART RATE: 92 BPM

## 2024-12-29 DIAGNOSIS — J02.9 SORE THROAT: ICD-10-CM

## 2024-12-29 DIAGNOSIS — R09.81 CONGESTION OF NASAL SINUS: ICD-10-CM

## 2024-12-29 DIAGNOSIS — R05.1 ACUTE COUGH: ICD-10-CM

## 2024-12-29 DIAGNOSIS — J06.9 UPPER RESPIRATORY TRACT INFECTION, UNSPECIFIED TYPE: Primary | ICD-10-CM

## 2024-12-29 DIAGNOSIS — R09.89 CHEST CONGESTION: ICD-10-CM

## 2024-12-29 LAB
POC RAPID INFLUENZA A: NEGATIVE
POC RAPID INFLUENZA B: NEGATIVE
POC RAPID STREP: NEGATIVE
POC SARS-COV-2 AG BINAX: NORMAL

## 2024-12-29 PROCEDURE — 3079F DIAST BP 80-89 MM HG: CPT | Performed by: PHYSICIAN ASSISTANT

## 2024-12-29 PROCEDURE — 87880 STREP A ASSAY W/OPTIC: CPT | Performed by: PHYSICIAN ASSISTANT

## 2024-12-29 PROCEDURE — 87804 INFLUENZA ASSAY W/OPTIC: CPT | Performed by: PHYSICIAN ASSISTANT

## 2024-12-29 PROCEDURE — 87811 SARS-COV-2 COVID19 W/OPTIC: CPT | Performed by: PHYSICIAN ASSISTANT

## 2024-12-29 PROCEDURE — 99214 OFFICE O/P EST MOD 30 MIN: CPT | Performed by: PHYSICIAN ASSISTANT

## 2024-12-29 PROCEDURE — 3077F SYST BP >= 140 MM HG: CPT | Performed by: PHYSICIAN ASSISTANT

## 2024-12-29 RX ORDER — ALBUTEROL SULFATE 90 UG/1
2 INHALANT RESPIRATORY (INHALATION) EVERY 6 HOURS PRN
Qty: 18 G | Refills: 0 | Status: SHIPPED | OUTPATIENT
Start: 2024-12-29 | End: 2025-12-29

## 2024-12-29 RX ORDER — BENZONATATE 100 MG/1
200 CAPSULE ORAL 3 TIMES DAILY PRN
Qty: 30 CAPSULE | Refills: 0 | Status: SHIPPED | OUTPATIENT
Start: 2024-12-29

## 2024-12-29 RX ORDER — DOXYCYCLINE 100 MG/1
100 TABLET ORAL 2 TIMES DAILY
Qty: 20 TABLET | Refills: 0 | Status: SHIPPED | OUTPATIENT
Start: 2024-12-29 | End: 2025-01-08

## 2024-12-29 ASSESSMENT — ENCOUNTER SYMPTOMS
SHORTNESS OF BREATH: 0
FATIGUE: 1
COUGH: 1
WHEEZING: 1
HEADACHES: 1
CHILLS: 1
SORE THROAT: 1

## 2024-12-29 NOTE — PATIENT INSTRUCTIONS
"Push fluids water juices  Take over-the-counter Tylenol as needed for the headache body aches chills    Take the albuterol if needed for when you have \"wheezing episodes\"  But if you are still having shortness of breath you need to be seen in the emergency room for further eval and treatment  "

## 2024-12-29 NOTE — PROGRESS NOTES
Subjective   Patient ID: Sabrina Michaud is a 59 y.o. female. They present today with a chief complaint of Cough (3 days ), chest congestion (Worse when laying down ), Shortness of Breath (Wheezing ), Sore Throat (3 days ), and Nasal Congestion (3 days ).  Patient states when she lays down she feels like she is wheezing no audible wheezes currently does have chills unsure if she has had fevers past medical history positive for hypertension diabetes    History of Present Illness    Cough  Associated symptoms include chills, headaches, a sore throat and wheezing. Pertinent negatives include no shortness of breath.   Shortness of Breath  Associated symptoms: cough, headaches, sore throat and wheezing    Sore Throat   Associated symptoms include congestion, coughing and headaches. Pertinent negatives include no shortness of breath.       Past Medical History  Allergies as of 12/29/2024 - Reviewed 12/29/2024   Allergen Reaction Noted    Other Other 02/21/2023    Piperacillin-tazobactam Swelling, Hives, and Itching 06/19/2023    Vancomycin Itching and Rash 06/19/2023       (Not in a hospital admission)       Past Medical History:   Diagnosis Date    Anxiety     Breast CA (Multi)     Depression     Diabetes mellitus (Multi)     Dyslipidemia     Fibroid     Hypertension     Liver cirrhosis, alcoholic (Multi)     Neoplasm of unspecified behavior of bone, soft tissue, and skin 08/22/2016    Skin growth    Peripheral neuropathy     PONV (postoperative nausea and vomiting)        Past Surgical History:   Procedure Laterality Date    BREAST SURGERY Bilateral     mastectomy with reconstruction    BREAST SURGERY Left     Debridement x 2    HYSTERECTOMY  05/31/2016    Hysterectomy    MASTECTOMY Bilateral     with repair    TOE AMPUTATION Right     TONSILLECTOMY  05/31/2016    Tonsillectomy        reports that she has never smoked. She has never used smokeless tobacco. She reports current alcohol use of about 2.0 - 3.0 standard  drinks of alcohol per week. She reports that she does not use drugs.    Review of Systems  Review of Systems   Constitutional:  Positive for chills and fatigue.   HENT:  Positive for congestion and sore throat.    Respiratory:  Positive for cough and wheezing. Negative for shortness of breath.    Neurological:  Positive for headaches.                                  Objective    Vitals:    12/29/24 1519   BP: 157/85   BP Location: Left arm   Patient Position: Sitting   Pulse: 92   Resp: 18   Temp: 36.8 °C (98.3 °F)   TempSrc: Oral   SpO2: 96%   Weight: 82.7 kg (182 lb 5.1 oz)     No LMP recorded. Patient has had a hysterectomy.    Physical Exam  Vitals and nursing note reviewed.   Constitutional:       Appearance: Normal appearance.   HENT:      Head: Normocephalic and atraumatic.      Right Ear: Tympanic membrane, ear canal and external ear normal.      Left Ear: Tympanic membrane, ear canal and external ear normal.      Nose: Congestion present.      Mouth/Throat:      Mouth: Mucous membranes are moist.   Eyes:      Extraocular Movements: Extraocular movements intact.      Pupils: Pupils are equal, round, and reactive to light.   Neck:      Vascular: No carotid bruit.   Cardiovascular:      Rate and Rhythm: Normal rate and regular rhythm.   Pulmonary:      Effort: Pulmonary effort is normal.      Breath sounds: Normal breath sounds.   Abdominal:      General: Abdomen is flat.      Palpations: Abdomen is soft.   Musculoskeletal:      Cervical back: Normal range of motion and neck supple. No rigidity or tenderness.   Lymphadenopathy:      Cervical: No cervical adenopathy.   Skin:     General: Skin is warm and dry.      Capillary Refill: Capillary refill takes less than 2 seconds.   Neurological:      General: No focal deficit present.      Mental Status: She is alert and oriented to person, place, and time.         Procedures    Point of Care Test & Imaging Results from this visit  Results for orders placed or  performed in visit on 12/29/24   POCT Covid-19 Rapid Antigen   Result Value Ref Range    POC OSWALDO-COV-2 AG  Presumptive negative test for SARS-CoV-2 (no antigen detected)     Presumptive negative test for SARS-CoV-2 (no antigen detected)   POCT Influenza A/B manually resulted   Result Value Ref Range    POC Rapid Influenza A Negative Negative    POC Rapid Influenza B Negative Negative   POCT rapid strep A manually resulted   Result Value Ref Range    POC Rapid Strep Negative Negative      No results found.    Diagnostic study results (if any) were reviewed by Citlali Darling PA-C.    Assessment/Plan   Allergies, medications, history, and pertinent labs/EKGs/Imaging reviewed by Citlali Darling PA-C.     Medical Decision Making  Differential:   1) COVID/influenza   2) viral URI   3) bronchitis/sinusitis    59-year-old female with past medical history positive for hypertension hyperlipidemia and diabetes 3-day history of cough congestion body aches headaches states when she lies down she feels like she is wheezing no chest pain no lightheaded or dizziness no shortness of breath or wheezing with activity  Strep, COVID, flu all negative  Stressed to the patient that the viral side of this will have to run its course because of her risk factors will prescribe an oral antibiotic but it will not help with the viral side of this only the bacterial told her to push fluids if her symptoms are getting worse she needs to go to the ER immediately  Will also prescribe an inhaler but currently no wheezing rales or rhonchi     Plan: Discussed differential with the patient and /or parents family   Patient to  follow up with the PCP in the next 2-3 days  Return for any worsening symptoms or go to the ER for further evaluation. Patient/family/caregiver  understands return   precautions and discharge instructions and is agreeable to the current plan   Impression: Upper respiratory infection        Orders and Diagnoses for this  visit    Orders and Diagnoses  Diagnoses and all orders for this visit:  Sore throat  -     POCT Covid-19 Rapid Antigen  -     POCT Influenza A/B manually resulted  -     POCT rapid strep A manually resulted  Acute cough  -     POCT Covid-19 Rapid Antigen  -     POCT Influenza A/B manually resulted  -     POCT rapid strep A manually resulted  Congestion of nasal sinus  -     POCT Covid-19 Rapid Antigen  -     POCT Influenza A/B manually resulted  -     POCT rapid strep A manually resulted  Chest congestion  -     POCT Covid-19 Rapid Antigen  -     POCT Influenza A/B manually resulted  -     POCT rapid strep A manually resulted      Medical Admin Record      Patient disposition: Home    Electronically signed by Citlali Darling PA-C  3:46 PM

## 2025-02-03 DIAGNOSIS — D50.9 IRON DEFICIENCY ANEMIA, UNSPECIFIED IRON DEFICIENCY ANEMIA TYPE: ICD-10-CM

## 2025-02-03 RX ORDER — FERROUS GLUCONATE 324(38)MG
TABLET ORAL
Qty: 180 TABLET | Refills: 0 | Status: SHIPPED | OUTPATIENT
Start: 2025-02-03

## 2025-03-03 DIAGNOSIS — I10 BENIGN ESSENTIAL HYPERTENSION: ICD-10-CM

## 2025-03-03 DIAGNOSIS — F32.A DEPRESSION, UNSPECIFIED DEPRESSION TYPE: ICD-10-CM

## 2025-03-03 RX ORDER — LOSARTAN POTASSIUM AND HYDROCHLOROTHIAZIDE 12.5; 5 MG/1; MG/1
1 TABLET ORAL DAILY
Qty: 90 TABLET | Refills: 0 | Status: SHIPPED | OUTPATIENT
Start: 2025-03-03

## 2025-03-03 RX ORDER — AMLODIPINE BESYLATE 10 MG/1
10 TABLET ORAL DAILY
Qty: 90 TABLET | Refills: 0 | Status: SHIPPED | OUTPATIENT
Start: 2025-03-03

## 2025-03-03 RX ORDER — METOPROLOL SUCCINATE 50 MG/1
50 TABLET, EXTENDED RELEASE ORAL DAILY
Qty: 90 TABLET | Refills: 0 | Status: SHIPPED | OUTPATIENT
Start: 2025-03-03

## 2025-03-03 RX ORDER — SERTRALINE HYDROCHLORIDE 100 MG/1
100 TABLET, FILM COATED ORAL 2 TIMES DAILY
Qty: 180 TABLET | Refills: 0 | Status: SHIPPED | OUTPATIENT
Start: 2025-03-03

## 2025-03-19 ENCOUNTER — APPOINTMENT (OUTPATIENT)
Dept: PRIMARY CARE | Facility: CLINIC | Age: 60
End: 2025-03-19
Payer: COMMERCIAL

## 2025-03-19 DIAGNOSIS — E78.5 DYSLIPIDEMIA: ICD-10-CM

## 2025-03-19 RX ORDER — ATORVASTATIN CALCIUM 20 MG/1
20 TABLET, FILM COATED ORAL DAILY
Qty: 90 TABLET | Refills: 0 | Status: SHIPPED | OUTPATIENT
Start: 2025-03-19

## 2025-04-22 ENCOUNTER — APPOINTMENT (OUTPATIENT)
Dept: PRIMARY CARE | Facility: CLINIC | Age: 60
End: 2025-04-22
Payer: COMMERCIAL

## 2025-04-24 ENCOUNTER — APPOINTMENT (OUTPATIENT)
Dept: HEMATOLOGY/ONCOLOGY | Facility: CLINIC | Age: 60
End: 2025-04-24
Payer: COMMERCIAL

## 2025-05-05 DIAGNOSIS — D50.9 IRON DEFICIENCY ANEMIA, UNSPECIFIED IRON DEFICIENCY ANEMIA TYPE: ICD-10-CM

## 2025-05-05 RX ORDER — FERROUS GLUCONATE 324(38)MG
TABLET ORAL
Qty: 180 TABLET | Refills: 0 | Status: SHIPPED | OUTPATIENT
Start: 2025-05-05

## 2025-05-15 ENCOUNTER — APPOINTMENT (OUTPATIENT)
Dept: PRIMARY CARE | Facility: CLINIC | Age: 60
End: 2025-05-15
Payer: COMMERCIAL

## 2025-06-02 DIAGNOSIS — I10 BENIGN ESSENTIAL HYPERTENSION: ICD-10-CM

## 2025-06-02 DIAGNOSIS — F32.A DEPRESSION, UNSPECIFIED DEPRESSION TYPE: ICD-10-CM

## 2025-06-02 RX ORDER — METOPROLOL SUCCINATE 50 MG/1
50 TABLET, EXTENDED RELEASE ORAL DAILY
Qty: 90 TABLET | Refills: 0 | Status: SHIPPED | OUTPATIENT
Start: 2025-06-02

## 2025-06-02 RX ORDER — AMLODIPINE BESYLATE 10 MG/1
10 TABLET ORAL DAILY
Qty: 90 TABLET | Refills: 0 | Status: SHIPPED | OUTPATIENT
Start: 2025-06-02

## 2025-06-02 RX ORDER — SERTRALINE HYDROCHLORIDE 100 MG/1
100 TABLET, FILM COATED ORAL 2 TIMES DAILY
Qty: 180 TABLET | Refills: 0 | Status: SHIPPED | OUTPATIENT
Start: 2025-06-02

## 2025-06-02 RX ORDER — LOSARTAN POTASSIUM AND HYDROCHLOROTHIAZIDE 12.5; 5 MG/1; MG/1
1 TABLET ORAL DAILY
Qty: 90 TABLET | Refills: 0 | Status: SHIPPED | OUTPATIENT
Start: 2025-06-02

## 2025-06-12 ENCOUNTER — APPOINTMENT (OUTPATIENT)
Dept: PRIMARY CARE | Facility: CLINIC | Age: 60
End: 2025-06-12
Payer: COMMERCIAL

## 2025-06-17 DIAGNOSIS — E78.5 DYSLIPIDEMIA: ICD-10-CM

## 2025-06-17 RX ORDER — ATORVASTATIN CALCIUM 20 MG/1
20 TABLET, FILM COATED ORAL DAILY
Qty: 30 TABLET | Refills: 0 | Status: SHIPPED | OUTPATIENT
Start: 2025-06-17

## 2025-08-04 DIAGNOSIS — D50.9 IRON DEFICIENCY ANEMIA, UNSPECIFIED IRON DEFICIENCY ANEMIA TYPE: ICD-10-CM

## 2025-08-04 RX ORDER — FERROUS GLUCONATE 324(38)MG
TABLET ORAL
Qty: 180 TABLET | Refills: 0 | Status: SHIPPED | OUTPATIENT
Start: 2025-08-04

## 2025-08-27 ENCOUNTER — APPOINTMENT (OUTPATIENT)
Dept: PRIMARY CARE | Facility: CLINIC | Age: 60
End: 2025-08-27
Payer: COMMERCIAL

## (undated) DEVICE — DRAPE, TAPE, ORTHO

## (undated) DEVICE — TIP, SUCTION, YANKAUER, FLEXIBLE

## (undated) DEVICE — SUTURE, PDS II, 2-0, 27 IN, SH, VIOLET

## (undated) DEVICE — CORD, CAUTERY, BIPOLAR, STERILE

## (undated) DEVICE — SLEEVE, VASO PRESS, CALF GARMENT, MEDIUM, GREEN

## (undated) DEVICE — SUTURE, MONOCRYLIC, 5-0, 18 IN, P-3

## (undated) DEVICE — TUBING, IRRIGATION, HIGH FLOW, HAND PIECE SET

## (undated) DEVICE — DRESSING, TRANSPARENT, TEGADERM, 4 X 4-3/4 IN

## (undated) DEVICE — SUTURE, ETHILON, 3-0, 18 IN, PS1, BLACK

## (undated) DEVICE — SUTURE, PROLENE, 2-0, 18 IN, FS, BLUE

## (undated) DEVICE — IRRIGATION SYSTEM, WOUND, SURGIPHOR, 450ML, STERILE

## (undated) DEVICE — STAPLER, SKIN, DISPOSABLE, 35 COUNT, WIDE, STERILE

## (undated) DEVICE — SUTURE, MONOCRYL, 3-0, 27 IN, PS-2, UNDYED

## (undated) DEVICE — Device

## (undated) DEVICE — SYSTEM KIT, INCISION, PREVENA PEEL AND PLACE, 13CM

## (undated) DEVICE — DRAPE, SURGICAL, LEGGINGS, W/ 6IN CUFF, FABRIC, BLUE

## (undated) DEVICE — REMOVAL KIT, SKIN STAPLE, W/SPONGE, 3 X 3 IN, DISPOSABLE, STERILE

## (undated) DEVICE — SOLUTION, IRRIGATION, X RX SODIUM CHL 0.9%, 1000ML BTL

## (undated) DEVICE — DRAPE, CHEST/BREAST

## (undated) DEVICE — GLOVE, SURGICAL, SYNTHETIC, DERMAPRENE, 8.5, PF, LF, GREEN

## (undated) DEVICE — SUPPORT, MAMMARY, THERAPEUTIC, SURGI-BRA, LARGE, LF

## (undated) DEVICE — WOUND SYSTEM, DEBRIDEMENT & CLEANING, O.R DUOPAK

## (undated) DEVICE — BANDAGE, GAUZE, COTTON, STERILE, BULKEE II, 4.5IN X 4.1YD

## (undated) DEVICE — GOWN, ASTOUND, L

## (undated) DEVICE — DRAIN, JP CHANNEL, 10 FR, FULL CHANNEL, W/ TROCAR

## (undated) DEVICE — GLOVE, SURGICAL, PROTEXIS PI , 8.5, PF, LF

## (undated) DEVICE — TIP,  ELECTRODE COATED INSULATED, EXTENDED, LF

## (undated) DEVICE — PREP, SKIN, BETADINE SOLUTION, 1/2 OZ

## (undated) DEVICE — SYRINGE, 50 CC, LUER LOCK

## (undated) DEVICE — EVACUATOR, WOUND, SUCTION, CLOSED, JACKSON-PRATT, 100 CC, SILICONE

## (undated) DEVICE — SYRINGE, LUER LOCK, 12ML

## (undated) DEVICE — MARKER, SURGICAL, SKIN, REG TIP, W/ RULER & LABELS

## (undated) DEVICE — SUTURE, VICRYL, 3-0,18 IN, SH, UNDYED

## (undated) DEVICE — NEEDLE, HYPODERMIC, MONOJECT, 25 G X 1.5 IN, LUER LOCK HUB, RED

## (undated) DEVICE — ELECTRODE, ELECTROSURGICAL, BLADE, INSULATED, ENT/IMA, STERILE

## (undated) DEVICE — APPLICATOR, CHLORAPREP, W/ORANGE TINT, 26ML

## (undated) DEVICE — CONTAINER, SPECIMEN, 120ML

## (undated) DEVICE — SPONGE, LAP, XRAY DECT, 18IN X 18IN, W/MASTER DMT, STERILE

## (undated) DEVICE — CAUTERY, PENCIL, PUSH BUTTON, SMOKE EVAC, 70MM

## (undated) DEVICE — SOLUTION, INJECTION, 0.9% SODIUM CHL, USP LIFECARE 1000 MI

## (undated) DEVICE — SYRINGE, 10 CC, LUER LOCK

## (undated) DEVICE — GOWN, SURGICAL, SMARTGOWN, XLARGE, STERILE

## (undated) DEVICE — PACK, UNIVERSAL

## (undated) DEVICE — NEEDLE, NEXIVA, 18GA X 1 1/4 IN

## (undated) DEVICE — SYRINGE, 60 CC, IRRIGATION, BULB, CONTRO-BULB, PAPER POUCH

## (undated) DEVICE — SUTURE, VICRYL, 2-0, 27 IN, SH, UNDYED

## (undated) DEVICE — STRIP, SKIN CLOSURE, STERI STRIP, REINFORCED, 1 X 5 IN

## (undated) DEVICE — SUTURE, VICRYL, 3-0, LIGAPAK, 54 IN, VIOLET

## (undated) DEVICE — TIP, SUCTION, YANKAUER, BULB, ADULT